# Patient Record
Sex: FEMALE | Race: WHITE | NOT HISPANIC OR LATINO | Employment: OTHER | URBAN - NONMETROPOLITAN AREA
[De-identification: names, ages, dates, MRNs, and addresses within clinical notes are randomized per-mention and may not be internally consistent; named-entity substitution may affect disease eponyms.]

---

## 2017-01-19 ENCOUNTER — TELEPHONE (OUTPATIENT)
Dept: CARDIOLOGY | Facility: CLINIC | Age: 82
End: 2017-01-19

## 2017-01-25 ENCOUNTER — TRANSCRIBE ORDERS (OUTPATIENT)
Dept: ADMINISTRATIVE | Facility: HOSPITAL | Age: 82
End: 2017-01-25

## 2017-01-25 DIAGNOSIS — M54.9 BACK PAIN, UNSPECIFIED BACK LOCATION, UNSPECIFIED BACK PAIN LATERALITY, UNSPECIFIED CHRONICITY: Primary | ICD-10-CM

## 2017-01-26 ENCOUNTER — CLINICAL SUPPORT (OUTPATIENT)
Dept: CARDIOLOGY | Facility: CLINIC | Age: 82
End: 2017-01-26

## 2017-01-26 DIAGNOSIS — Z95.818 STATUS POST PLACEMENT OF IMPLANTABLE LOOP RECORDER: Primary | ICD-10-CM

## 2017-01-26 DIAGNOSIS — I63.9 CRYPTOGENIC STROKE (HCC): ICD-10-CM

## 2017-01-26 PROCEDURE — 93298 REM INTERROG DEV EVAL SCRMS: CPT | Performed by: INTERNAL MEDICINE

## 2017-01-26 PROCEDURE — 93299 PR REM INTERROG ICPMS/SCRMS <30 D TECH REVIEW: CPT | Performed by: INTERNAL MEDICINE

## 2017-01-31 ENCOUNTER — HOSPITAL ENCOUNTER (OUTPATIENT)
Dept: MRI IMAGING | Facility: HOSPITAL | Age: 82
Discharge: HOME | End: 2017-01-31
Admitting: FAMILY MEDICINE

## 2017-01-31 DIAGNOSIS — M54.9 BACK PAIN, UNSPECIFIED BACK LOCATION, UNSPECIFIED BACK PAIN LATERALITY, UNSPECIFIED CHRONICITY: ICD-10-CM

## 2017-01-31 PROCEDURE — 72146 MRI CHEST SPINE W/O DYE: CPT

## 2017-02-10 ENCOUNTER — OFFICE VISIT (OUTPATIENT)
Dept: NEUROLOGY | Facility: CLINIC | Age: 82
End: 2017-02-10

## 2017-02-10 VITALS
HEIGHT: 62 IN | SYSTOLIC BLOOD PRESSURE: 160 MMHG | HEART RATE: 72 BPM | BODY MASS INDEX: 34.04 KG/M2 | WEIGHT: 185 LBS | DIASTOLIC BLOOD PRESSURE: 80 MMHG

## 2017-02-10 DIAGNOSIS — F01.50 VASCULAR DEMENTIA WITHOUT BEHAVIORAL DISTURBANCE (HCC): ICD-10-CM

## 2017-02-10 DIAGNOSIS — I63.311 CEREBROVASCULAR ACCIDENT (CVA) DUE TO THROMBOSIS OF RIGHT MIDDLE CEREBRAL ARTERY (HCC): Primary | ICD-10-CM

## 2017-02-10 PROCEDURE — 99213 OFFICE O/P EST LOW 20 MIN: CPT | Performed by: PHYSICIAN ASSISTANT

## 2017-02-10 RX ORDER — LISINOPRIL AND HYDROCHLOROTHIAZIDE 20; 12.5 MG/1; MG/1
1 TABLET ORAL DAILY
COMMUNITY

## 2017-02-11 NOTE — PROGRESS NOTES
Subjective   Rupa Hernandez is a 81 y.o. female is here today for follow-up.    HPI Comments: Some lability with her blood pressures she is following with her family doctor for.  Slow progressive dementia changes.  Tolerating medication well.    Dementia   This is a chronic problem. The current episode started more than 1 year ago. The problem has been gradually worsening. Associated symptoms include arthralgias, fatigue, myalgias and weakness. Pertinent negatives include no abdominal pain, chest pain, chills, coughing, fever, headaches, nausea, neck pain, rash or vomiting. The symptoms are aggravated by stress. She has tried rest (Family support and pharmacologic measures, control of underlying illnesses including vascular disease) for the symptoms. The treatment provided no relief.   Stroke   This is a chronic problem. The current episode started more than 1 year ago. The problem occurs daily. The problem has been unchanged. Associated symptoms include arthralgias, fatigue, myalgias and weakness. Pertinent negatives include no abdominal pain, chest pain, chills, coughing, fever, headaches, nausea, neck pain, rash or vomiting. Nothing aggravates the symptoms. Treatments tried: Altered activity, adaptive measures, physical therapy rehabilitation, medication. The treatment provided mild relief.       The following portions of the patient's history were reviewed and updated as appropriate: allergies, current medications, past family history, past medical history, past social history, past surgical history and problem list.    Review of Systems   Constitutional: Positive for fatigue. Negative for chills and fever.   HENT: Positive for hearing loss. Negative for ear pain and nosebleeds.    Eyes: Negative.    Respiratory: Negative.  Negative for cough.    Cardiovascular: Negative.  Negative for chest pain.   Gastrointestinal: Negative.  Negative for abdominal pain, nausea and vomiting.   Endocrine: Negative.     Genitourinary: Negative.    Musculoskeletal: Positive for arthralgias and myalgias. Negative for gait problem and neck pain.   Skin: Negative.  Negative for rash.   Allergic/Immunologic: Negative.    Neurological: Positive for speech difficulty, weakness and light-headedness. Negative for syncope and headaches.   Hematological: Negative.    Psychiatric/Behavioral: Positive for agitation, confusion, decreased concentration, dysphoric mood and sleep disturbance. Negative for behavioral problems and hallucinations. The patient is nervous/anxious.        Objective   Physical Exam   Constitutional: Vital signs are normal. She appears well-developed and well-nourished. No distress.   HENT:   Head: Normocephalic and atraumatic.   Right Ear: External ear normal.   Left Ear: External ear normal.   Mouth/Throat: Uvula is midline, oropharynx is clear and moist and mucous membranes are normal.   Eyes: Conjunctivae, EOM and lids are normal. Pupils are equal, round, and reactive to light. No scleral icterus.   Neck: Phonation normal. Normal carotid pulses present. No spinous process tenderness and no muscular tenderness present. Carotid bruit is not present. Decreased range of motion present. No thyroid mass and no thyromegaly present.   Cardiovascular: Normal rate, regular rhythm, S1 normal, S2 normal and normal heart sounds.    No murmur heard.  Pulmonary/Chest: Effort normal and breath sounds normal. No stridor. No respiratory distress. She has no wheezes. She has no rales.   Musculoskeletal: She exhibits no edema or tenderness.        Lumbar back: Normal.   Lymphadenopathy:     She has no cervical adenopathy.   Neurological: She is alert. She has normal strength. She displays no atrophy and no tremor. No cranial nerve deficit or sensory deficit. She exhibits normal muscle tone. She displays a negative Romberg sign. Coordination and gait normal. GCS eye subscore is 4. GCS verbal subscore is 5. GCS motor subscore is 6.    Reflex Scores:       Tricep reflexes are 1+ on the right side and 1+ on the left side.       Bicep reflexes are 1+ on the right side and 1+ on the left side.       Brachioradialis reflexes are 1+ on the right side and 1+ on the left side.       Patellar reflexes are 1+ on the right side and 1+ on the left side.       Achilles reflexes are 1+ on the right side and 1+ on the left side.  Skin: Skin is warm, dry and intact. No ecchymosis, no lesion and no rash noted. No cyanosis. Nails show no clubbing.   Psychiatric: Her affect is labile. Her speech is delayed and tangential. She is slowed. She is not actively hallucinating. Cognition and memory are impaired. She expresses impulsivity. She exhibits abnormal recent memory. She is inattentive.   Nursing note and vitals reviewed.        Assessment/Plan   Rupa was seen today for dementia and stroke.    Diagnoses and all orders for this visit:    Cerebrovascular accident (CVA) due to thrombosis of right middle cerebral artery    Vascular dementia without behavioral disturbance    She is on Xarelto continue Pravachol no changes were made in these medications today.  Continue watching her blood pressure carefully.    Tolerating donepezil and memantine no changes.    10 minutes of 15 minute outpatient visit spent in counseling and coordination of care.          EMR Dragon transcription disclaimer:  Much of this encounter note is an electronic transcription/translation of spoken language to printed text.  The electronic translation of spoken language may permit erroneous, or at times, nonsensical words or phrases to be inadvertently transcribed.  The author has reviewed the note for such errors, however some may still exist.

## 2017-02-12 NOTE — PROGRESS NOTES
I have reviewed the notes, assessments, and/or procedures performed by Eleni Ren RN, I concur with her documentation of Rupa Hernandez.

## 2017-02-13 ENCOUNTER — OFFICE VISIT (OUTPATIENT)
Dept: SURGERY | Age: 82
End: 2017-02-13
Payer: MEDICARE

## 2017-02-13 ENCOUNTER — HOSPITAL ENCOUNTER (OUTPATIENT)
Dept: WOMENS IMAGING | Age: 82
Discharge: HOME | End: 2017-02-13
Payer: MEDICARE

## 2017-02-13 VITALS
HEART RATE: 80 BPM | SYSTOLIC BLOOD PRESSURE: 134 MMHG | DIASTOLIC BLOOD PRESSURE: 80 MMHG | HEIGHT: 62 IN | BODY MASS INDEX: 33.13 KG/M2 | WEIGHT: 180 LBS

## 2017-02-13 DIAGNOSIS — Z12.31 VISIT FOR SCREENING MAMMOGRAM: ICD-10-CM

## 2017-02-13 DIAGNOSIS — Z12.31 VISIT FOR SCREENING MAMMOGRAM: Primary | ICD-10-CM

## 2017-02-13 PROCEDURE — G8484 FLU IMMUNIZE NO ADMIN: HCPCS | Performed by: PHYSICIAN ASSISTANT

## 2017-02-13 PROCEDURE — G8400 PT W/DXA NO RESULTS DOC: HCPCS | Performed by: PHYSICIAN ASSISTANT

## 2017-02-13 PROCEDURE — 1036F TOBACCO NON-USER: CPT | Performed by: PHYSICIAN ASSISTANT

## 2017-02-13 PROCEDURE — 99212 OFFICE O/P EST SF 10 MIN: CPT | Performed by: PHYSICIAN ASSISTANT

## 2017-02-13 PROCEDURE — 4040F PNEUMOC VAC/ADMIN/RCVD: CPT | Performed by: PHYSICIAN ASSISTANT

## 2017-02-13 PROCEDURE — 1090F PRES/ABSN URINE INCON ASSESS: CPT | Performed by: PHYSICIAN ASSISTANT

## 2017-02-13 PROCEDURE — G8419 CALC BMI OUT NRM PARAM NOF/U: HCPCS | Performed by: PHYSICIAN ASSISTANT

## 2017-02-13 PROCEDURE — G8427 DOCREV CUR MEDS BY ELIG CLIN: HCPCS | Performed by: PHYSICIAN ASSISTANT

## 2017-02-13 PROCEDURE — G0202 SCR MAMMO BI INCL CAD: HCPCS

## 2017-02-13 PROCEDURE — 1123F ACP DISCUSS/DSCN MKR DOCD: CPT | Performed by: PHYSICIAN ASSISTANT

## 2017-04-07 ENCOUNTER — HOSPITAL ENCOUNTER (OUTPATIENT)
Dept: PAIN MANAGEMENT | Age: 82
Discharge: HOME | End: 2017-04-07
Payer: MEDICARE

## 2017-04-07 VITALS
HEIGHT: 62 IN | WEIGHT: 191 LBS | OXYGEN SATURATION: 95 % | DIASTOLIC BLOOD PRESSURE: 65 MMHG | HEART RATE: 67 BPM | RESPIRATION RATE: 16 BRPM | BODY MASS INDEX: 35.15 KG/M2 | TEMPERATURE: 97.8 F | SYSTOLIC BLOOD PRESSURE: 172 MMHG

## 2017-04-07 PROCEDURE — G0463 HOSPITAL OUTPT CLINIC VISIT: HCPCS

## 2017-04-07 RX ORDER — MELOXICAM 7.5 MG/1
7.5 TABLET ORAL DAILY
COMMUNITY
End: 2017-06-22

## 2017-04-07 RX ORDER — MEMANTINE HYDROCHLORIDE 28 MG/1
28 CAPSULE, EXTENDED RELEASE ORAL DAILY
COMMUNITY

## 2017-04-07 RX ORDER — CETIRIZINE HYDROCHLORIDE 10 MG/1
10 TABLET ORAL DAILY
COMMUNITY

## 2017-04-07 RX ORDER — DOCUSATE SODIUM 250 MG
250 CAPSULE ORAL DAILY
COMMUNITY

## 2017-04-07 RX ORDER — IPRATROPIUM BROMIDE 21 UG/1
2 SPRAY, METERED NASAL EVERY 12 HOURS
COMMUNITY

## 2017-04-07 RX ORDER — CHLORAL HYDRATE 500 MG
3000 CAPSULE ORAL DAILY
COMMUNITY

## 2017-04-07 RX ORDER — M-VIT,TX,IRON,MINS/CALC/FOLIC 27MG-0.4MG
1 TABLET ORAL DAILY
COMMUNITY

## 2017-04-07 RX ORDER — LISINOPRIL AND HYDROCHLOROTHIAZIDE 20; 12.5 MG/1; MG/1
1 TABLET ORAL DAILY
COMMUNITY

## 2017-04-07 ASSESSMENT — PAIN SCALES - GENERAL: PAINLEVEL_OUTOF10: 7

## 2017-04-07 ASSESSMENT — PAIN DESCRIPTION - PROGRESSION: CLINICAL_PROGRESSION: GRADUALLY WORSENING

## 2017-04-07 ASSESSMENT — PAIN DESCRIPTION - DIRECTION: RADIATING_TOWARDS: DOES NOT RADIATE

## 2017-04-07 ASSESSMENT — PAIN DESCRIPTION - ONSET: ONSET: ON-GOING

## 2017-04-07 ASSESSMENT — PAIN DESCRIPTION - ORIENTATION: ORIENTATION: MID

## 2017-04-07 ASSESSMENT — PAIN DESCRIPTION - FREQUENCY: FREQUENCY: CONTINUOUS

## 2017-04-07 ASSESSMENT — PAIN DESCRIPTION - PAIN TYPE: TYPE: CHRONIC PAIN

## 2017-04-07 ASSESSMENT — PAIN DESCRIPTION - LOCATION: LOCATION: BACK

## 2017-04-24 ENCOUNTER — OFFICE VISIT (OUTPATIENT)
Dept: CARDIOLOGY | Facility: CLINIC | Age: 82
End: 2017-04-24

## 2017-04-24 VITALS
HEIGHT: 62 IN | SYSTOLIC BLOOD PRESSURE: 160 MMHG | WEIGHT: 186.6 LBS | RESPIRATION RATE: 18 BRPM | DIASTOLIC BLOOD PRESSURE: 72 MMHG | HEART RATE: 59 BPM | BODY MASS INDEX: 34.34 KG/M2

## 2017-04-24 DIAGNOSIS — I10 ESSENTIAL HYPERTENSION: Primary | ICD-10-CM

## 2017-04-24 DIAGNOSIS — F01.50 VASCULAR DEMENTIA WITHOUT BEHAVIORAL DISTURBANCE (HCC): ICD-10-CM

## 2017-04-24 DIAGNOSIS — I63.311 CEREBROVASCULAR ACCIDENT (CVA) DUE TO THROMBOSIS OF RIGHT MIDDLE CEREBRAL ARTERY (HCC): ICD-10-CM

## 2017-04-24 DIAGNOSIS — I48.0 PAF (PAROXYSMAL ATRIAL FIBRILLATION) (HCC): ICD-10-CM

## 2017-04-24 DIAGNOSIS — E78.2 MIXED HYPERLIPIDEMIA: ICD-10-CM

## 2017-04-24 PROCEDURE — 99214 OFFICE O/P EST MOD 30 MIN: CPT | Performed by: INTERNAL MEDICINE

## 2017-04-24 PROCEDURE — 93000 ELECTROCARDIOGRAM COMPLETE: CPT | Performed by: INTERNAL MEDICINE

## 2017-04-24 RX ORDER — POTASSIUM CHLORIDE 750 MG/1
10 TABLET, FILM COATED, EXTENDED RELEASE ORAL 2 TIMES DAILY
COMMUNITY
End: 2017-04-24

## 2017-04-24 RX ORDER — ALBUTEROL SULFATE 2.5 MG/3ML
2.5 SOLUTION RESPIRATORY (INHALATION) 2 TIMES DAILY
COMMUNITY
End: 2018-10-01

## 2017-04-24 RX ORDER — HYDROXYCHLOROQUINE SULFATE 200 MG/1
TABLET, FILM COATED ORAL DAILY
COMMUNITY
End: 2017-04-24

## 2017-04-24 RX ORDER — ROPINIROLE 1 MG/1
1 TABLET, FILM COATED ORAL NIGHTLY
COMMUNITY
End: 2017-04-24

## 2017-04-24 RX ORDER — ASPIRIN 81 MG/1
81 TABLET ORAL DAILY
COMMUNITY
End: 2017-04-24

## 2017-04-24 RX ORDER — OMEPRAZOLE 20 MG/1
20 CAPSULE, DELAYED RELEASE ORAL DAILY
COMMUNITY
End: 2017-04-24

## 2017-04-24 RX ORDER — ERGOCALCIFEROL 1.25 MG/1
50000 CAPSULE ORAL WEEKLY
COMMUNITY
End: 2018-10-15 | Stop reason: ALTCHOICE

## 2017-04-24 RX ORDER — GABAPENTIN 300 MG/1
300 CAPSULE ORAL 3 TIMES DAILY
COMMUNITY
End: 2017-04-24

## 2017-04-24 RX ORDER — HYDROCHLOROTHIAZIDE 12.5 MG/1
12.5 TABLET ORAL DAILY
COMMUNITY
End: 2017-04-24

## 2017-04-24 RX ORDER — TRAMADOL HYDROCHLORIDE 50 MG/1
50 TABLET ORAL EVERY 6 HOURS PRN
COMMUNITY
End: 2017-04-24

## 2017-04-24 RX ORDER — METOPROLOL SUCCINATE 100 MG/1
100 TABLET, EXTENDED RELEASE ORAL DAILY
COMMUNITY
End: 2017-04-24

## 2017-04-24 NOTE — PROGRESS NOTES
Rupa Hernandez  [id number]  1935  81 y.o.  female    Referring Provider: Rocco Hauser MD    Reason for Follow-up Visit: PAF    Overall doing well  Denies any chest pain  No excessive shortness of breath  Compliant with medications    History of present illness:  Rupa Hernandez is a 81 y.o. yo female with history of PAF and prior CVA  who presents today for   Chief Complaint   Patient presents with   • Slow Heart Rate     6 month    .    History  Past Medical History:   Diagnosis Date   • Dementia    • Depression    • Hypertension    • Stroke    ,   Past Surgical History:   Procedure Laterality Date   • APPENDECTOMY     • CATARACT EXTRACTION     • HYSTERECTOMY     • KNEE SURGERY Bilateral     X2   • ROTATOR CUFF REPAIR Right    ,   Family History   Problem Relation Age of Onset   • No Known Problems Mother    • No Known Problems Father    ,   Social History   Substance Use Topics   • Smoking status: Never Smoker   • Smokeless tobacco: Never Used   • Alcohol use No   ,     Medications  Current Outpatient Prescriptions   Medication Sig Dispense Refill   • Calcium Carbonate-Vitamin D (CALCIUM PLUS VITAMIN D PO) Take  by mouth Daily.     • cetirizine (ZyrTEC) 10 MG tablet Take 10 mg by mouth Daily.     • CRANBERRY SOFT PO Take 25,000 mg by mouth Daily.     • donepezil (ARICEPT) 10 MG tablet Take 10 mg by mouth Every Night.     • lisinopril-hydrochlorothiazide (PRINZIDE,ZESTORETIC) 20-12.5 MG per tablet Take 1 tablet by mouth Daily.     • memantine (NAMENDA XR) 28 MG capsule sustained-release 24 hr extended release capsule Take 28 mg by mouth Daily.     • Multiple Vitamin (MULTI-VITAMIN PO) Take  by mouth Daily.     • Omega-3 Fatty Acids (FISH OIL) 1000 MG capsule capsule Take  by mouth Daily With Breakfast.     • PARoxetine (PAXIL) 10 MG tablet Take 20 mg by mouth Daily.     • pravastatin (PRAVACHOL) 40 MG tablet Take 40 mg by mouth Daily.     • rivaroxaban (XARELTO) 20 MG tablet Take 1 tablet by mouth Daily. 90 tablet  "3   • albuterol (PROVENTIL) (2.5 MG/3ML) 0.083% nebulizer solution Take 2.5 mg by nebulization 2 (Two) Times a Day.     • diclofenac (VOLTAREN) 1 % gel gel      • vitamin D (ERGOCALCIFEROL) 44185 UNITS capsule capsule Take 50,000 Units by mouth 1 (One) Time Per Week.       No current facility-administered medications for this visit.        Allergies:  Adhesive tape; Atorvastatin; Codeine; Gabapentin; Lortab [hydrocodone-acetaminophen]; Morphine and related; Morphine sulfate; Percocet [oxycodone-acetaminophen]; and Pregabalin    Review of Systems  Review of Systems   Constitution: Negative.   HENT: Negative.    Eyes: Negative.    Cardiovascular: Negative for chest pain, claudication, cyanosis, dyspnea on exertion, irregular heartbeat, leg swelling, near-syncope, orthopnea, palpitations, paroxysmal nocturnal dyspnea and syncope.   Respiratory: Negative.    Endocrine: Negative.    Hematologic/Lymphatic: Negative.    Skin: Negative.    Gastrointestinal: Negative for anorexia.   Genitourinary: Negative.    Neurological: Negative.    Psychiatric/Behavioral: Negative.        Objective     Physical Exam:  /72 (BP Location: Left arm, Patient Position: Sitting, Cuff Size: Adult)  Pulse 59  Resp 18  Ht 62\" (157.5 cm)  Wt 186 lb 9.6 oz (84.6 kg)  BMI 34.13 kg/m2  Physical Exam   Constitutional: She appears well-developed.   HENT:   Head: Normocephalic.   Neck: Normal carotid pulses and no JVD present. No tracheal tenderness present. Carotid bruit is not present. No tracheal deviation and no edema present.   Cardiovascular: Regular rhythm and normal pulses.    Murmur heard.   Systolic murmur is present with a grade of 2/6   Pulmonary/Chest: Effort normal. No stridor.   Abdominal: Soft.   Neurological: She is alert. She has normal strength. No cranial nerve deficit or sensory deficit.   Skin: Skin is warm.   Psychiatric: She has a normal mood and affect. Her speech is normal and behavior is normal.       Results " Review:       ECG 12 Lead  Date/Time: 4/24/2017 10:46 AM  Performed by: JASON HASSAN  Authorized by: JASON HASSAN   Comparison: compared with previous ECG from 10/12/2016  Similar to previous ECG  Rhythm: sinus bradycardia  QRS axis: right  Clinical impression: abnormal ECG  Comments: Low voltage            Assessment/Plan   Patient Active Problem List   Diagnosis   • Essential hypertension   • Mixed hyperlipidemia   • Cerebrovascular accident (CVA)   • PAF (paroxysmal atrial fibrillation)   • Vascular dementia without behavioral disturbance   • Depression   • Hypertension   • Dementia   • Stroke       No palpitations. No significant pedal edema. Compliant with medications and diet. Latest labs and medications reviewed.    Plan:  No additional cardiac testing required at this point in time.  Can hold Xarelto 48 hours for any surgical procedures  Close follow up with you as scheduled.  Intensive factor modifications.  See order list.    Counseled regarding disease appropriate diet, fluid, caffeine, stimulants and sodium intake as well as importance of compliance to diet, exercise and regular follow up.  Avoid NSAIDS and COX2 inhibitors. Use Acetaminophen PRN.    Return in about 9 months (around 1/24/2018).

## 2017-04-26 ENCOUNTER — CLINICAL SUPPORT (OUTPATIENT)
Dept: CARDIOLOGY | Facility: CLINIC | Age: 82
End: 2017-04-26

## 2017-04-26 DIAGNOSIS — I63.9 CRYPTOGENIC STROKE (HCC): ICD-10-CM

## 2017-04-26 DIAGNOSIS — Z95.818 STATUS POST PLACEMENT OF IMPLANTABLE LOOP RECORDER: Primary | ICD-10-CM

## 2017-04-26 PROCEDURE — 93299 PR REM INTERROG ICPMS/SCRMS <30 D TECH REVIEW: CPT | Performed by: INTERNAL MEDICINE

## 2017-04-26 PROCEDURE — 93298 REM INTERROG DEV EVAL SCRMS: CPT | Performed by: INTERNAL MEDICINE

## 2017-04-26 NOTE — PROGRESS NOTES
[facility] Report- [facility]    April 26, 2017    Primary Cardiologist:  Laura  Reason for implant:  cryptogenic stroke  Battery:  OK  Events:  4 a-fib episodes- longest 6 hours 22 min, max v rate 167 bpm, average v rate WNL   2 episodes of bradycardia- longest 39 bpm, longest 13 seconds, both during hours pt likely asleep   1 episode recorded as tachycardia but appears to be interference- pt had an MRI at that time.   meds include xarelto  Changes:  n/a    Follow up:  3 months

## 2017-05-01 ENCOUNTER — TELEPHONE (OUTPATIENT)
Dept: PAIN MANAGEMENT | Age: 82
End: 2017-05-01

## 2017-05-02 ENCOUNTER — TELEPHONE (OUTPATIENT)
Dept: PAIN MANAGEMENT | Age: 82
End: 2017-05-02

## 2017-05-04 ENCOUNTER — HOSPITAL ENCOUNTER (OUTPATIENT)
Dept: PAIN MANAGEMENT | Age: 82
Discharge: HOME | End: 2017-05-04

## 2017-05-04 ENCOUNTER — TELEPHONE (OUTPATIENT)
Dept: CARDIOLOGY | Facility: CLINIC | Age: 82
End: 2017-05-04

## 2017-05-08 ENCOUNTER — HOSPITAL ENCOUNTER (OUTPATIENT)
Dept: PAIN MANAGEMENT | Age: 82
Discharge: HOME | End: 2017-05-08
Payer: MEDICARE

## 2017-05-08 VITALS
TEMPERATURE: 98 F | SYSTOLIC BLOOD PRESSURE: 145 MMHG | HEART RATE: 60 BPM | OXYGEN SATURATION: 98 % | RESPIRATION RATE: 16 BRPM | DIASTOLIC BLOOD PRESSURE: 68 MMHG

## 2017-05-08 DIAGNOSIS — M51.9 THORACIC DISC DISEASE: Chronic | ICD-10-CM

## 2017-05-08 DIAGNOSIS — M54.59 LUMBAR FACET JOINT PAIN: ICD-10-CM

## 2017-05-08 PROCEDURE — 62321 NJX INTERLAMINAR CRV/THRC: CPT

## 2017-05-08 PROCEDURE — 3209999900 FLUORO FOR SURGICAL PROCEDURES

## 2017-05-08 PROCEDURE — 6360000002 HC RX W HCPCS

## 2017-05-08 PROCEDURE — 2500000003 HC RX 250 WO HCPCS

## 2017-05-08 PROCEDURE — 2580000003 HC RX 258

## 2017-05-08 RX ORDER — BUPIVACAINE HYDROCHLORIDE 2.5 MG/ML
INJECTION, SOLUTION EPIDURAL; INFILTRATION; INTRACAUDAL
Status: COMPLETED | OUTPATIENT
Start: 2017-05-08 | End: 2017-05-08

## 2017-05-08 RX ORDER — 0.9 % SODIUM CHLORIDE 0.9 %
VIAL (ML) INJECTION
Status: COMPLETED | OUTPATIENT
Start: 2017-05-08 | End: 2017-05-08

## 2017-05-08 RX ORDER — METHYLPREDNISOLONE ACETATE 80 MG/ML
INJECTION, SUSPENSION INTRA-ARTICULAR; INTRALESIONAL; INTRAMUSCULAR; SOFT TISSUE
Status: COMPLETED | OUTPATIENT
Start: 2017-05-08 | End: 2017-05-08

## 2017-05-08 RX ORDER — LIDOCAINE HYDROCHLORIDE 10 MG/ML
INJECTION, SOLUTION EPIDURAL; INFILTRATION; INTRACAUDAL; PERINEURAL
Status: COMPLETED | OUTPATIENT
Start: 2017-05-08 | End: 2017-05-08

## 2017-05-08 RX ADMIN — Medication 3 ML: at 16:43

## 2017-05-08 RX ADMIN — LIDOCAINE HYDROCHLORIDE 3 ML: 10 INJECTION, SOLUTION EPIDURAL; INFILTRATION; INTRACAUDAL; PERINEURAL at 16:37

## 2017-05-08 RX ADMIN — METHYLPREDNISOLONE ACETATE 60 MG: 80 INJECTION, SUSPENSION INTRA-ARTICULAR; INTRALESIONAL; INTRAMUSCULAR; SOFT TISSUE at 16:44

## 2017-05-08 RX ADMIN — BUPIVACAINE HYDROCHLORIDE 4 ML: 2.5 INJECTION, SOLUTION EPIDURAL; INFILTRATION; INTRACAUDAL at 16:41

## 2017-05-08 ASSESSMENT — PAIN - FUNCTIONAL ASSESSMENT
PAIN_FUNCTIONAL_ASSESSMENT: 0-10
PAIN_FUNCTIONAL_ASSESSMENT: 0-10

## 2017-06-22 ENCOUNTER — HOSPITAL ENCOUNTER (OUTPATIENT)
Dept: PAIN MANAGEMENT | Age: 82
Discharge: HOME | End: 2017-06-22
Payer: MEDICARE

## 2017-06-22 VITALS
OXYGEN SATURATION: 94 % | DIASTOLIC BLOOD PRESSURE: 54 MMHG | RESPIRATION RATE: 20 BRPM | HEART RATE: 65 BPM | WEIGHT: 191 LBS | TEMPERATURE: 96.8 F | BODY MASS INDEX: 35.15 KG/M2 | SYSTOLIC BLOOD PRESSURE: 142 MMHG | HEIGHT: 62 IN

## 2017-06-22 DIAGNOSIS — M51.9 THORACIC DISC DISEASE: ICD-10-CM

## 2017-06-22 PROCEDURE — 99213 OFFICE O/P EST LOW 20 MIN: CPT

## 2017-06-22 ASSESSMENT — PAIN DESCRIPTION - PROGRESSION: CLINICAL_PROGRESSION: NOT CHANGED

## 2017-06-22 ASSESSMENT — PAIN DESCRIPTION - LOCATION: LOCATION: BACK

## 2017-06-22 ASSESSMENT — PAIN DESCRIPTION - PAIN TYPE: TYPE: CHRONIC PAIN

## 2017-06-22 ASSESSMENT — PAIN DESCRIPTION - FREQUENCY: FREQUENCY: CONTINUOUS

## 2017-06-22 ASSESSMENT — PAIN SCALES - GENERAL: PAINLEVEL_OUTOF10: 4

## 2017-06-22 ASSESSMENT — PAIN DESCRIPTION - ORIENTATION: ORIENTATION: MID

## 2017-06-22 ASSESSMENT — PAIN DESCRIPTION - ONSET: ONSET: ON-GOING

## 2017-07-13 ENCOUNTER — TRANSCRIBE ORDERS (OUTPATIENT)
Dept: ADMINISTRATIVE | Facility: HOSPITAL | Age: 82
End: 2017-07-13

## 2017-07-13 DIAGNOSIS — R55 SYNCOPE AND COLLAPSE: Primary | ICD-10-CM

## 2017-07-19 ENCOUNTER — APPOINTMENT (OUTPATIENT)
Dept: MRI IMAGING | Facility: HOSPITAL | Age: 82
End: 2017-07-19

## 2017-07-19 ENCOUNTER — TELEPHONE (OUTPATIENT)
Dept: PAIN MANAGEMENT | Age: 82
End: 2017-07-19

## 2017-07-19 ENCOUNTER — HOSPITAL ENCOUNTER (OUTPATIENT)
Dept: MRI IMAGING | Facility: HOSPITAL | Age: 82
Discharge: HOME | End: 2017-07-19
Admitting: FAMILY MEDICINE

## 2017-07-19 DIAGNOSIS — R55 SYNCOPE AND COLLAPSE: ICD-10-CM

## 2017-07-19 PROCEDURE — 70551 MRI BRAIN STEM W/O DYE: CPT

## 2017-08-11 ENCOUNTER — OFFICE VISIT (OUTPATIENT)
Dept: NEUROLOGY | Facility: CLINIC | Age: 82
End: 2017-08-11

## 2017-08-11 ENCOUNTER — CLINICAL SUPPORT (OUTPATIENT)
Dept: CARDIOLOGY | Facility: CLINIC | Age: 82
End: 2017-08-11

## 2017-08-11 VITALS
SYSTOLIC BLOOD PRESSURE: 140 MMHG | HEART RATE: 64 BPM | WEIGHT: 186 LBS | BODY MASS INDEX: 34.23 KG/M2 | HEIGHT: 62 IN | DIASTOLIC BLOOD PRESSURE: 80 MMHG

## 2017-08-11 DIAGNOSIS — I67.9 CEREBROVASCULAR SMALL VESSEL DISEASE: ICD-10-CM

## 2017-08-11 DIAGNOSIS — I63.311 CEREBROVASCULAR ACCIDENT (CVA) DUE TO THROMBOSIS OF RIGHT MIDDLE CEREBRAL ARTERY (HCC): ICD-10-CM

## 2017-08-11 DIAGNOSIS — Z95.818 STATUS POST PLACEMENT OF IMPLANTABLE LOOP RECORDER: Primary | ICD-10-CM

## 2017-08-11 DIAGNOSIS — F01.50 VASCULAR DEMENTIA WITHOUT BEHAVIORAL DISTURBANCE (HCC): Primary | ICD-10-CM

## 2017-08-11 DIAGNOSIS — I48.0 PAF (PAROXYSMAL ATRIAL FIBRILLATION) (HCC): ICD-10-CM

## 2017-08-11 DIAGNOSIS — I63.9 CRYPTOGENIC STROKE (HCC): ICD-10-CM

## 2017-08-11 PROCEDURE — 93299 PR REM INTERROG ICPMS/SCRMS <30 D TECH REVIEW: CPT | Performed by: INTERNAL MEDICINE

## 2017-08-11 PROCEDURE — 99214 OFFICE O/P EST MOD 30 MIN: CPT | Performed by: PHYSICIAN ASSISTANT

## 2017-08-11 PROCEDURE — 93298 REM INTERROG DEV EVAL SCRMS: CPT | Performed by: INTERNAL MEDICINE

## 2017-08-11 RX ORDER — AMLODIPINE BESYLATE 5 MG/1
5 TABLET ORAL DAILY
COMMUNITY

## 2017-08-11 RX ORDER — ASPIRIN 81 MG/1
81 TABLET ORAL DAILY
COMMUNITY
End: 2017-08-11

## 2017-08-11 NOTE — PROGRESS NOTES
Subjective   Rupa Hernandez is a 82 y.o. female is here today for follow-up.    HPI Comments: Progressive neurocognitive decline.  The patient has had, in the last 6 months, 2 episodes of syncope or near syncope attributed to postural hypotension.  The  relates that he is concerned that donepezil be contributing to these.  The patient is had a recent MRI that is available for review today.    Stroke   This is a chronic problem. The current episode started more than 1 year ago. The problem occurs daily. The problem has been unchanged. Associated symptoms include arthralgias, fatigue, myalgias and weakness. Pertinent negatives include no chills, congestion, fever, headaches or neck pain. Nothing aggravates the symptoms. Treatments tried: Altered activity, adaptive measures, physical therapy rehabilitation, medication. The treatment provided mild relief.   Dementia   This is a chronic problem. The current episode started more than 1 year ago. The problem has been gradually worsening. Associated symptoms include arthralgias, fatigue, myalgias and weakness. Pertinent negatives include no chills, congestion, fever, headaches or neck pain. The symptoms are aggravated by stress. She has tried rest (Family support and pharmacologic measures, control of underlying illnesses including vascular disease) for the symptoms. The treatment provided no relief.       The following portions of the patient's history were reviewed and updated as appropriate: allergies, current medications, past family history, past medical history, past social history, past surgical history and problem list.    Review of Systems   Constitutional: Positive for fatigue. Negative for chills and fever.   HENT: Positive for hearing loss. Negative for congestion and nosebleeds.    Eyes: Negative.    Respiratory: Negative.    Cardiovascular: Negative.    Gastrointestinal: Negative.    Endocrine: Negative.    Genitourinary: Negative.    Musculoskeletal:  Positive for arthralgias and myalgias. Negative for gait problem and neck pain.   Skin: Negative.    Allergic/Immunologic: Negative.    Neurological: Positive for syncope, speech difficulty, weakness and light-headedness. Negative for facial asymmetry and headaches.   Hematological: Negative.    Psychiatric/Behavioral: Positive for agitation, confusion, decreased concentration, dysphoric mood and sleep disturbance. Negative for behavioral problems and hallucinations. The patient is nervous/anxious.        Objective   Physical Exam   Constitutional: Vital signs are normal. She appears well-developed and well-nourished. No distress.   HENT:   Head: Normocephalic and atraumatic.   Right Ear: External ear normal.   Left Ear: External ear normal.   Mouth/Throat: Uvula is midline, oropharynx is clear and moist and mucous membranes are normal.   Eyes: Conjunctivae, EOM and lids are normal. Pupils are equal, round, and reactive to light. No scleral icterus.   Neck: Phonation normal. Normal carotid pulses present. No spinous process tenderness and no muscular tenderness present. Carotid bruit is not present. Decreased range of motion present. No thyroid mass and no thyromegaly present.   Cardiovascular: Normal rate, regular rhythm, S1 normal, S2 normal and normal heart sounds.    No murmur heard.  Pulmonary/Chest: Effort normal and breath sounds normal. No stridor. No respiratory distress. She has no wheezes. She has no rales.   Musculoskeletal: She exhibits no edema or tenderness.        Lumbar back: Normal.   Lymphadenopathy:     She has no cervical adenopathy.   Neurological: She is alert. She has normal strength. She displays no atrophy and no tremor. No cranial nerve deficit or sensory deficit. She exhibits normal muscle tone. She displays a negative Romberg sign. Coordination and gait normal. GCS eye subscore is 4. GCS verbal subscore is 5. GCS motor subscore is 6.   Reflex Scores:       Tricep reflexes are 1+ on the  right side and 1+ on the left side.       Bicep reflexes are 1+ on the right side and 1+ on the left side.       Brachioradialis reflexes are 1+ on the right side and 1+ on the left side.       Patellar reflexes are 1+ on the right side and 1+ on the left side.       Achilles reflexes are 1+ on the right side and 1+ on the left side.  Skin: Skin is warm, dry and intact. No ecchymosis, no lesion and no rash noted. No cyanosis. Nails show no clubbing.   Psychiatric: Her affect is labile. Her speech is delayed and tangential. She is slowed. She is not actively hallucinating. Cognition and memory are impaired. She expresses impulsivity. She exhibits abnormal recent memory. She is inattentive.   Nursing note and vitals reviewed.        Assessment/Plan   Rupa was seen today for stroke and dementia.    Diagnoses and all orders for this visit:    Vascular dementia without behavioral disturbance    Cerebrovascular accident (CVA) due to thrombosis of right middle cerebral artery    The patient's  raises concern over adverse reaction to donepezil.  The patient has had multiple adverse medication reactions in the past, it is not likely donepezil is contributing to relief of her neurocognitive degeneration and therefore is recommended that this medicine be discontinued.    The patient's 07/19/2017 MRI of the brain is compared to 12/02/2015 and MRI of the brain. In  2015, the patient had some small areas of acute infarct in the right frontal lobe, these have evolved into more chronic changes.  She has rather extensive small vessel cerebrovascular disease and substantial atrophy.  There are no acute changes and her MRI is considered stable.    The patient remains on Xarelto for cardiac disease/ PAF.  Conversation with the patient and family regarding the combination of aspirin and Xarelto.  It is not felt that the addition of aspirin to Xarelto will contribute to improvement in her quality of life or delay of her  neurocognitive degeneration and therefore the risk of increased bleeding may be greater then the improvement that can be expected from the addition of aspirin.    20 minutes of a 25 minute outpatient visit was spent in counseling and coordination of care today.        EMR Dragon transcription disclaimer:  Much of this encounter note is an electronic transcription/translation of spoken language to printed text.  The electronic translation of spoken language may permit erroneous, or at times, nonsensical words or phrases to be inadvertently transcribed.  The author has reviewed the note for such errors, however some may still exist.

## 2017-08-12 NOTE — PROGRESS NOTES
[facility] Report- [facility]    August 11, 2017    Primary Cardiologist:  Laura  Reason for implant:  cryptogenic stroke  Battery:  OK  Events:  1 episode recorded as a pause- undersensing of PVCs, not true pause   6 episodes of bradycardia- longest 30 seconds, lowest rate 31 bpm, some early morning, others likely while pt awake, no symptoms recorded  Changes:  n/a    Follow up:  3 months

## 2017-08-20 NOTE — PROGRESS NOTES
I have reviewed the notes, assessments, and/or procedures performed by KANG Murillo, I concur with her/his documentation of Rupa Hernandez.  Te Medrano MD

## 2017-08-26 NOTE — PROGRESS NOTES
I have reviewed the notes, assessments, and/or procedures performed by Eleni Ren RN , I concur with her  documentation of  Rupa Hernandez.

## 2017-09-08 ENCOUNTER — CLINICAL SUPPORT (OUTPATIENT)
Dept: CARDIOLOGY | Facility: CLINIC | Age: 82
End: 2017-09-08

## 2017-09-08 DIAGNOSIS — Z95.818 STATUS POST PLACEMENT OF IMPLANTABLE LOOP RECORDER: Primary | ICD-10-CM

## 2017-09-08 DIAGNOSIS — I63.9 CRYPTOGENIC STROKE (HCC): ICD-10-CM

## 2017-09-08 NOTE — PROGRESS NOTES
[facility] Monitoring Service Report    September 8, 2017    Primary Cardiologist:  Laura  Reason for implant:  cryptogenic stroke  Battery:  OK  Events:  There were 5 Jesus episodes of which 0 were symptomatic.  Changes:  N/A    Follow up:  1 month

## 2017-09-09 NOTE — PROGRESS NOTES
I have reviewed the notes, assessments, and/or procedures performed by Priya Mckeon , I concur with her  documentation of    Rupa Hernandez.

## 2017-11-27 ENCOUNTER — CLINICAL SUPPORT (OUTPATIENT)
Dept: CARDIOLOGY | Facility: CLINIC | Age: 82
End: 2017-11-27

## 2017-11-27 DIAGNOSIS — I63.9 CRYPTOGENIC STROKE (HCC): ICD-10-CM

## 2017-11-27 DIAGNOSIS — Z95.818 STATUS POST PLACEMENT OF IMPLANTABLE LOOP RECORDER: Primary | ICD-10-CM

## 2017-11-27 PROCEDURE — 93298 REM INTERROG DEV EVAL SCRMS: CPT | Performed by: INTERNAL MEDICINE

## 2017-11-27 NOTE — PROGRESS NOTES
[facility] Monitoring Service Report    November 27, 2017    Primary Cardiologist:  Laura  Reason for implant:  cryptogenic stroke  Battery:    Events:  There were 1 Jesus episodes of which 0 were symptomatic.  Changes:  n/a    Follow up:  1 month

## 2017-12-27 ENCOUNTER — CLINICAL SUPPORT (OUTPATIENT)
Dept: CARDIOLOGY | Facility: CLINIC | Age: 82
End: 2017-12-27

## 2017-12-27 DIAGNOSIS — Z95.818 STATUS POST PLACEMENT OF IMPLANTABLE LOOP RECORDER: Primary | ICD-10-CM

## 2017-12-27 DIAGNOSIS — I63.9 CRYPTOGENIC STROKE (HCC): ICD-10-CM

## 2017-12-27 PROCEDURE — 93298 REM INTERROG DEV EVAL SCRMS: CPT | Performed by: INTERNAL MEDICINE

## 2017-12-27 NOTE — PROGRESS NOTES
[facility] Service Report    December 27, 2017    Primary Cardiologist:  Laura  Reason for implant:  cryptogenic stroke  Battery:    Events:  There were no automatically detected episodes and there were no  symptomatic episodes documented by the patient.  Changes:  n/a    Follow up:  1 month

## 2018-01-08 ENCOUNTER — CLINICAL SUPPORT (OUTPATIENT)
Dept: CARDIOLOGY | Facility: CLINIC | Age: 83
End: 2018-01-08

## 2018-01-08 DIAGNOSIS — I63.89 CEREBROVASCULAR ACCIDENT (CVA) DUE TO OTHER MECHANISM (HCC): ICD-10-CM

## 2018-01-08 PROCEDURE — 93298 REM INTERROG DEV EVAL SCRMS: CPT | Performed by: PHYSICIAN ASSISTANT

## 2018-01-08 PROCEDURE — 93299 PR REM INTERROG ICPMS/SCRMS <30 D TECH REVIEW: CPT | Performed by: PHYSICIAN ASSISTANT

## 2018-01-08 NOTE — PROGRESS NOTES
[facility] Report- [facility]    January 8, 2018    Primary Cardiologist:  Laura  Reason for implant:  cryptogenic stroke  Battery:  RRT  Events:  none  Changes:  n/a    Follow up:  Will d/w Dr. Sanchez re: replacement.

## 2018-01-10 ENCOUNTER — TELEPHONE (OUTPATIENT)
Dept: SURGERY | Age: 83
End: 2018-01-10

## 2018-01-10 NOTE — PROGRESS NOTES
I have reviewed the notes, assessments, and/or procedures performed by  Samantha Melton PA-C  , I concur with her  documentation of   Rupa Hernandez.

## 2018-01-23 ENCOUNTER — TELEPHONE (OUTPATIENT)
Dept: CARDIOLOGY | Facility: CLINIC | Age: 83
End: 2018-01-23

## 2018-01-23 ENCOUNTER — OFFICE VISIT (OUTPATIENT)
Dept: CARDIOLOGY | Facility: CLINIC | Age: 83
End: 2018-01-23

## 2018-01-23 VITALS
HEART RATE: 63 BPM | BODY MASS INDEX: 34.23 KG/M2 | SYSTOLIC BLOOD PRESSURE: 130 MMHG | HEIGHT: 62 IN | OXYGEN SATURATION: 98 % | DIASTOLIC BLOOD PRESSURE: 76 MMHG | WEIGHT: 186 LBS

## 2018-01-23 DIAGNOSIS — I10 ESSENTIAL HYPERTENSION: Primary | ICD-10-CM

## 2018-01-23 DIAGNOSIS — F01.50 VASCULAR DEMENTIA WITHOUT BEHAVIORAL DISTURBANCE (HCC): ICD-10-CM

## 2018-01-23 DIAGNOSIS — I48.0 PAF (PAROXYSMAL ATRIAL FIBRILLATION) (HCC): ICD-10-CM

## 2018-01-23 DIAGNOSIS — E78.2 MIXED HYPERLIPIDEMIA: ICD-10-CM

## 2018-01-23 DIAGNOSIS — F32.89 OTHER DEPRESSION: ICD-10-CM

## 2018-01-23 DIAGNOSIS — I67.9 CEREBROVASCULAR SMALL VESSEL DISEASE: ICD-10-CM

## 2018-01-23 PROCEDURE — 93000 ELECTROCARDIOGRAM COMPLETE: CPT | Performed by: INTERNAL MEDICINE

## 2018-01-23 PROCEDURE — 99214 OFFICE O/P EST MOD 30 MIN: CPT | Performed by: INTERNAL MEDICINE

## 2018-01-23 NOTE — PROGRESS NOTES
Rupa Hernandez  [id number]  1935  82 y.o.  female    Referring Provider: Rocco Hauser MD    Reason for Follow-up Visit:  Routine follow up.  Paroxysmal atrial fibrillation     Subjective    Overall feeling well   No chest pain or shortness of breath   No palpitations  No significant pedal edema  Compliant with medications and dietary advice  Good effort tolerance  No presyncope or syncope  Compliant with medications        History of present illness:  Rupa Hernandez is a 82 y.o. yo female with history of Paroxysmal atrial fibrillation  and prior cerebrovascular accident  who presents today for   Chief Complaint   Patient presents with   • Hypertension     9 mo f/u   • Dizziness   .    History  Past Medical History:   Diagnosis Date   • Dementia    • Depression    • Hypertension    • Stroke    ,   Past Surgical History:   Procedure Laterality Date   • APPENDECTOMY     • CATARACT EXTRACTION     • HYSTERECTOMY     • KNEE SURGERY Bilateral     X2   • ROTATOR CUFF REPAIR Right    ,   Family History   Problem Relation Age of Onset   • No Known Problems Mother    • No Known Problems Father    ,   Social History   Substance Use Topics   • Smoking status: Never Smoker   • Smokeless tobacco: Never Used   • Alcohol use No   ,     Medications  Current Outpatient Prescriptions   Medication Sig Dispense Refill   • albuterol (PROVENTIL) (2.5 MG/3ML) 0.083% nebulizer solution Take 2.5 mg by nebulization 2 (Two) Times a Day.     • amLODIPine (NORVASC) 5 MG tablet Take 5 mg by mouth Daily.     • Calcium Carbonate-Vitamin D (CALCIUM PLUS VITAMIN D PO) Take  by mouth Daily.     • cetirizine (ZyrTEC) 10 MG tablet Take 10 mg by mouth Daily.     • CRANBERRY SOFT PO Take 25,000 mg by mouth Daily.     • diclofenac (VOLTAREN) 1 % gel gel      • lisinopril-hydrochlorothiazide (PRINZIDE,ZESTORETIC) 20-12.5 MG per tablet Take 1 tablet by mouth Daily.     • memantine (NAMENDA XR) 28 MG capsule sustained-release 24 hr extended release capsule  "Take 28 mg by mouth Daily.     • Multiple Vitamin (MULTI-VITAMIN PO) Take  by mouth Daily.     • Omega-3 Fatty Acids (FISH OIL) 1000 MG capsule capsule Take  by mouth Daily With Breakfast.     • PARoxetine (PAXIL) 10 MG tablet Take 20 mg by mouth Daily.     • pravastatin (PRAVACHOL) 40 MG tablet Take 40 mg by mouth Daily.     • rivaroxaban (XARELTO) 20 MG tablet Take 1 tablet by mouth Daily. 90 tablet 3   • vitamin D (ERGOCALCIFEROL) 15963 UNITS capsule capsule Take 50,000 Units by mouth 1 (One) Time Per Week.       No current facility-administered medications for this visit.        Allergies:  Adhesive tape; Atorvastatin; Codeine; Gabapentin; Lortab [hydrocodone-acetaminophen]; Morphine and related; Morphine sulfate; Oxycodone-acetaminophen; Percocet [oxycodone-acetaminophen]; and Pregabalin    Review of Systems  Review of Systems   Constitution: Negative.   HENT: Negative.    Eyes: Negative.    Cardiovascular: Negative for chest pain, claudication, cyanosis, dyspnea on exertion, irregular heartbeat, leg swelling, near-syncope, orthopnea, palpitations, paroxysmal nocturnal dyspnea and syncope.   Respiratory: Negative.    Endocrine: Negative.    Hematologic/Lymphatic: Negative.    Skin: Negative.    Gastrointestinal: Negative for anorexia.   Genitourinary: Negative.    Neurological: Negative.    Psychiatric/Behavioral: Negative.        Objective     Physical Exam:  /76  Pulse 63  Ht 157.5 cm (62.01\")  Wt 84.4 kg (186 lb)  SpO2 98%  BMI 34.01 kg/m2  Physical Exam   Constitutional: She appears well-developed.   HENT:   Head: Normocephalic.   Neck: Normal carotid pulses and no JVD present. No tracheal tenderness present. Carotid bruit is not present. No tracheal deviation and no edema present.   Cardiovascular: Regular rhythm and normal pulses.    Murmur heard.   Systolic murmur is present with a grade of 2/6   Pulmonary/Chest: Effort normal. No stridor.   Abdominal: Soft.   Neurological: She is alert. She " "has normal strength. No cranial nerve deficit or sensory deficit.   Skin: Skin is warm.   Psychiatric: She has a normal mood and affect. Her speech is normal and behavior is normal.       Results Review:       ECG 12 Lead  Date/Time: 1/23/2018 11:20 AM  Performed by: JASON HASSAN  Authorized by: JASON HASSAN   Comparison: compared with previous ECG from 4/24/2017  Similar to previous ECG  Comparison to previous ECG: Poor R wave progression   Rhythm: sinus bradycardia  Rate: bradycardic  Conduction: conduction normal  ST Segments: ST segments normal  T Waves: T waves normal  QRS axis: normal  Clinical impression: abnormal ECG            Assessment/Plan   Patient Active Problem List   Diagnosis   • Essential hypertension   • Mixed hyperlipidemia   • Cerebrovascular accident (CVA)   • PAF (paroxysmal atrial fibrillation)   • Vascular dementia without behavioral disturbance   • Depression   • Dementia   • Cerebrovascular small vessel disease     BP well controlled at home. No palpitations. No significant pedal edema. Compliant with medications and diet. Latest labs and medications reviewed.    Plan:    Low salt/ HTN/ Heart healthy carbohydrate restricted cardiac diet as applicable to this patient's current diagnoses.   This handout has relevant information regarding shopping for food, preparing meals, what to eat at restaurants, tracking of food intake, information regarding sodium intake and salt content, how to read food labels, knowing what to eat, tips reagarding physical activity, calorie count and calorie expenditure. What foods to avoid. Information regarding alcoholic drinks along with \"good\" and \"bad\" fats.  Relevant printed educational materials given pertinent to above diagnoses     Monitor for any signs of bleeding including red or dark stools. Fall precautions.   Patient is asked to monitor BP at home or work, several times per month and return with written values at next office visit.   Close follow up " with you as scheduled.  Intensive factor modifications.  See order list.    Keep LDL below 70 mg/dl. Monitor liver and renal functions.   Monitor CBC, CMP and Lipid Panel by primary   Monitor cardiac rhythm device (loop recorder) function regularly per established schedule or PRN Counseled regarding disease appropriate diet, fluid, caffeine, stimulants and sodium intake as well as importance of compliance to diet, exercise and regular follow up.  Avoid NSAIDS and COX2 inhibitors. Use Acetaminophen PRN.  The patient was advised that NSAID-type medications have three  very important potential side effects: cardiovascular complications, gastrointestinal irritation including hemorrhage and renal injuries.  Do not use anti-inflammatories such as Motrin/ibuprofen, Alleve/naprosyn, Mobic and like medications Use Tylenol instead.The patient expresses understanding of these issues and questions were answered.   monitor for any signs of bleeding including red or dark stools. Fall precautions.    No additional cardiac testing required at this point in time.  Gave a copy of my notes and relevant tests/ prior ECG etc for the patient to review and follow specific advise and relevant findings if any, prognosis, along with my current and future plans.       Return in about 6 months (around 7/23/2018).

## 2018-03-06 ENCOUNTER — OFFICE VISIT (OUTPATIENT)
Dept: SURGERY | Age: 83
End: 2018-03-06
Payer: MEDICARE

## 2018-03-06 ENCOUNTER — HOSPITAL ENCOUNTER (OUTPATIENT)
Dept: WOMENS IMAGING | Age: 83
Discharge: HOME | End: 2018-03-06
Payer: MEDICARE

## 2018-03-06 VITALS
HEART RATE: 80 BPM | SYSTOLIC BLOOD PRESSURE: 130 MMHG | BODY MASS INDEX: 34.41 KG/M2 | WEIGHT: 187 LBS | HEIGHT: 62 IN | DIASTOLIC BLOOD PRESSURE: 70 MMHG

## 2018-03-06 DIAGNOSIS — Z12.31 VISIT FOR SCREENING MAMMOGRAM: ICD-10-CM

## 2018-03-06 DIAGNOSIS — Z12.39 SCREENING BREAST EXAMINATION: Primary | ICD-10-CM

## 2018-03-06 PROCEDURE — G8427 DOCREV CUR MEDS BY ELIG CLIN: HCPCS | Performed by: PHYSICIAN ASSISTANT

## 2018-03-06 PROCEDURE — 4040F PNEUMOC VAC/ADMIN/RCVD: CPT | Performed by: PHYSICIAN ASSISTANT

## 2018-03-06 PROCEDURE — 99212 OFFICE O/P EST SF 10 MIN: CPT | Performed by: PHYSICIAN ASSISTANT

## 2018-03-06 PROCEDURE — 1123F ACP DISCUSS/DSCN MKR DOCD: CPT | Performed by: PHYSICIAN ASSISTANT

## 2018-03-06 PROCEDURE — G8400 PT W/DXA NO RESULTS DOC: HCPCS | Performed by: PHYSICIAN ASSISTANT

## 2018-03-06 PROCEDURE — 1090F PRES/ABSN URINE INCON ASSESS: CPT | Performed by: PHYSICIAN ASSISTANT

## 2018-03-06 PROCEDURE — 77063 BREAST TOMOSYNTHESIS BI: CPT

## 2018-03-06 PROCEDURE — G8417 CALC BMI ABV UP PARAM F/U: HCPCS | Performed by: PHYSICIAN ASSISTANT

## 2018-03-06 PROCEDURE — 1036F TOBACCO NON-USER: CPT | Performed by: PHYSICIAN ASSISTANT

## 2018-03-06 PROCEDURE — G8484 FLU IMMUNIZE NO ADMIN: HCPCS | Performed by: PHYSICIAN ASSISTANT

## 2018-03-07 NOTE — PROGRESS NOTES
HPI:  Isaac Rosenberg is in for yearly follow-up breast check. She has not noticed any changes in her breasts. SCREENING BILATERAL DIGITAL MAMMOGRAM WITH TOMOSYNTHESIS 3/6/2018   11:49 AM   CLINICAL HISTORY: Screening.     COMPARISON STUDIES: 2/13/2017, 2/12/2016 and 2/11/2015. FINDINGS:    Digital CC and MLO views of bilateral breasts were obtained. Tomosynthesis in the MLO and CC projections was also performed. There are scattered fibroglandular densities (approximately 25-50%   glandular tissue) consistent with a type B parenchymal pattern. No   suspicious masses or calcifications are identified. There has been no   interval change. This study was interpreted with CAD. IMPRESSION AND RECOMMENDATION:    No mammographic evidence of malignancy. Recommendation is for the   patient to return for routine mammography in one year or sooner, if   clinically indicated.    BIRADS Category 2 - Benign findings         BREAST EXAM:  On examination, she has fibrocystic changes throughout both breasts, no dominant masses, no skin or nipple changes and no axillary adenopathy. I see nothing suspicious for breast cancer. ASSESSMENT:  Normal breast exam and mammogram    PLAN:  I will plan to see her back in 1 year for physical exam and yearly mammograms. She will contact me if anything significant changes.

## 2018-03-30 ENCOUNTER — OFFICE VISIT (OUTPATIENT)
Dept: NEUROLOGY | Facility: CLINIC | Age: 83
End: 2018-03-30

## 2018-03-30 VITALS
HEIGHT: 62 IN | BODY MASS INDEX: 34.6 KG/M2 | HEART RATE: 66 BPM | SYSTOLIC BLOOD PRESSURE: 132 MMHG | WEIGHT: 188 LBS | DIASTOLIC BLOOD PRESSURE: 80 MMHG

## 2018-03-30 DIAGNOSIS — I63.311 CEREBROVASCULAR ACCIDENT (CVA) DUE TO THROMBOSIS OF RIGHT MIDDLE CEREBRAL ARTERY (HCC): Primary | ICD-10-CM

## 2018-03-30 DIAGNOSIS — I67.9 CEREBROVASCULAR SMALL VESSEL DISEASE: ICD-10-CM

## 2018-03-30 DIAGNOSIS — F01.50 VASCULAR DEMENTIA WITHOUT BEHAVIORAL DISTURBANCE (HCC): ICD-10-CM

## 2018-03-30 PROCEDURE — 99213 OFFICE O/P EST LOW 20 MIN: CPT | Performed by: PHYSICIAN ASSISTANT

## 2018-03-30 PROCEDURE — 3288F FALL RISK ASSESSMENT DOCD: CPT | Performed by: PHYSICIAN ASSISTANT

## 2018-03-30 RX ORDER — METHYLPREDNISOLONE 4 MG/1
TABLET ORAL
Refills: 0 | COMMUNITY
Start: 2018-03-22 | End: 2018-04-02

## 2018-03-30 RX ORDER — MEMANTINE HYDROCHLORIDE 10 MG/1
10 TABLET ORAL 2 TIMES DAILY
Qty: 60 TABLET | Refills: 6 | Status: SHIPPED | OUTPATIENT
Start: 2018-03-30 | End: 2018-10-01

## 2018-03-30 NOTE — PROGRESS NOTES
Subjective   Rupa Hernandez is a 82 y.o. female is here today for follow-up.    Stroke   This is a chronic problem. The current episode started more than 1 year ago. The problem occurs daily. The problem has been unchanged. Associated symptoms include arthralgias, fatigue, myalgias and weakness. Pertinent negatives include no chills, congestion, fever, headaches or neck pain. Nothing aggravates the symptoms. Treatments tried: Altered activity, adaptive measures, physical therapy rehabilitation, medication. The treatment provided mild relief.   Dementia   This is a chronic problem. The current episode started more than 1 year ago. The problem has been gradually worsening. Associated symptoms include arthralgias, fatigue, myalgias and weakness. Pertinent negatives include no chills, congestion, fever, headaches or neck pain. The symptoms are aggravated by stress. She has tried rest (Family support and pharmacologic measures, control of underlying illnesses including vascular disease) for the symptoms. The treatment provided no relief.       The following portions of the patient's history were reviewed and updated as appropriate: allergies, current medications, past family history, past medical history, past social history, past surgical history and problem list.    Review of Systems   Constitutional: Positive for fatigue. Negative for chills and fever.   HENT: Positive for hearing loss. Negative for congestion and nosebleeds.    Eyes: Negative.    Respiratory: Negative.    Cardiovascular: Negative.    Gastrointestinal: Negative.    Endocrine: Negative.    Genitourinary: Negative.    Musculoskeletal: Positive for arthralgias and myalgias. Negative for gait problem and neck pain.   Skin: Negative.    Allergic/Immunologic: Negative.    Neurological: Positive for syncope, speech difficulty, weakness and light-headedness. Negative for facial asymmetry and headaches.   Hematological: Negative.    Psychiatric/Behavioral:  Positive for agitation, confusion, decreased concentration, dysphoric mood and sleep disturbance. Negative for behavioral problems and hallucinations. The patient is nervous/anxious.        Objective   Physical Exam   Constitutional: Vital signs are normal. She appears well-developed and well-nourished. No distress.   HENT:   Head: Normocephalic and atraumatic.   Right Ear: External ear normal.   Left Ear: External ear normal.   Mouth/Throat: Uvula is midline, oropharynx is clear and moist and mucous membranes are normal.   Eyes: Conjunctivae, EOM and lids are normal. Pupils are equal, round, and reactive to light. No scleral icterus.   Neck: Phonation normal. Normal carotid pulses present. No spinous process tenderness and no muscular tenderness present. Carotid bruit is not present. Decreased range of motion present. No thyroid mass and no thyromegaly present.   Cardiovascular: Normal rate, regular rhythm, S1 normal, S2 normal and normal heart sounds.    No murmur heard.  Pulmonary/Chest: Effort normal and breath sounds normal. No stridor. No respiratory distress. She has no wheezes. She has no rales.   Musculoskeletal: She exhibits no edema or tenderness.        Lumbar back: Normal.   Lymphadenopathy:     She has no cervical adenopathy.   Neurological: She is alert. She has normal strength. She displays no atrophy and no tremor. No cranial nerve deficit or sensory deficit. She exhibits normal muscle tone. She displays a negative Romberg sign. Coordination and gait normal. GCS eye subscore is 4. GCS verbal subscore is 5. GCS motor subscore is 6.   Reflex Scores:       Tricep reflexes are 1+ on the right side and 1+ on the left side.       Bicep reflexes are 1+ on the right side and 1+ on the left side.       Brachioradialis reflexes are 1+ on the right side and 1+ on the left side.       Patellar reflexes are 1+ on the right side and 1+ on the left side.       Achilles reflexes are 1+ on the right side and 1+ on  the left side.  Skin: Skin is warm, dry and intact. No ecchymosis, no lesion and no rash noted. No cyanosis. Nails show no clubbing.   Psychiatric: Her affect is labile. Her speech is delayed and tangential. She is slowed. She is not actively hallucinating. Cognition and memory are impaired. She expresses impulsivity. She exhibits abnormal recent memory. She is inattentive.   Nursing note and vitals reviewed.        Assessment/Plan   Rupa was seen today for stroke and dementia.    Diagnoses and all orders for this visit:    Cerebrovascular accident (CVA) due to thrombosis of right middle cerebral artery    Cerebrovascular small vessel disease    Vascular dementia without behavioral disturbance    Patient is neurologically stable.  There have been some issues affording the extended release Namenda.  I provided them with a printed prescription for regular release memantine 10 mg twice per day, the patient's  is going to check with her insurance company as to whether this would provide them with a cheaper alternative.  If it is much cheaper, they will switch to memantine 10 mg twice per day, if there is not much difference they will remain on extended release Namenda XR 28 mg per day.    No other changes were made in her medication regimen today.    10 minutes of a 15 minute outpatient visit was spent in counseling and coordination of care today.      Fall Risk Assessment  Fallen in past 6 months: 0--> No  Mental Status: 1--> confused  Mobility: 0--> No mobility issues  Medications: 5--> Diuretics  Total Fall Risk Score: 8    Dictated utilizing Dragon dictation.

## 2018-05-07 ENCOUNTER — CLINICAL SUPPORT (OUTPATIENT)
Dept: CARDIOLOGY | Facility: CLINIC | Age: 83
End: 2018-05-07

## 2018-05-07 DIAGNOSIS — Z95.818 STATUS POST PLACEMENT OF IMPLANTABLE LOOP RECORDER: Primary | ICD-10-CM

## 2018-05-07 DIAGNOSIS — I63.9 CRYPTOGENIC STROKE (HCC): ICD-10-CM

## 2018-05-07 PROCEDURE — 93299 PR REM INTERROG ICPMS/SCRMS <30 D TECH REVIEW: CPT | Performed by: INTERNAL MEDICINE

## 2018-05-07 PROCEDURE — 93298 REM INTERROG DEV EVAL SCRMS: CPT | Performed by: INTERNAL MEDICINE

## 2018-05-09 ENCOUNTER — TELEPHONE (OUTPATIENT)
Dept: CARDIOLOGY | Facility: CLINIC | Age: 83
End: 2018-05-09

## 2018-05-09 NOTE — TELEPHONE ENCOUNTER
Patient's  returned RN's call.  RN notified him that patient's LINQ monitor has reached EOS.  They will bring monitor into office to be returned to [employer] at next clinic visit.  Patient prefers to leave device in at this time.

## 2018-05-09 NOTE — TELEPHONE ENCOUNTER
Left message for patient.  Need to discuss EOS Linq monitor.  Device has been EOS since 04-FEB-2018.  Need to know patient's preference regarding explantation.  Will continue to try to reach.

## 2018-05-09 NOTE — PROGRESS NOTES
Linq Report- REMOTE/CARELINK    MAY 7, 2018    Primary Cardiologist:  Laura  Reason for implant:  cryptogenic stroke  Battery:  EOS since 04-FEB-2018  Events:  1 pause episode on April 5, 2018, at 06:38 a.m., duration 4 seconds, presumably during sleeping hours.  Changes:  N/A    Follow up:  RN will call patient to discuss her preference regarding LINQ explantation.

## 2018-05-14 NOTE — PROGRESS NOTES
I have reviewed the notes, assessments, and/or procedures performed by  Rita Haque RN, I concur with her  documentation of   Rupa Hernandez.

## 2018-10-01 ENCOUNTER — OFFICE VISIT (OUTPATIENT)
Dept: NEUROLOGY | Facility: CLINIC | Age: 83
End: 2018-10-01

## 2018-10-01 VITALS
HEART RATE: 65 BPM | HEIGHT: 62 IN | BODY MASS INDEX: 34.23 KG/M2 | SYSTOLIC BLOOD PRESSURE: 162 MMHG | DIASTOLIC BLOOD PRESSURE: 80 MMHG | WEIGHT: 186 LBS

## 2018-10-01 DIAGNOSIS — I67.9 CEREBROVASCULAR SMALL VESSEL DISEASE: ICD-10-CM

## 2018-10-01 DIAGNOSIS — I63.311 CEREBROVASCULAR ACCIDENT (CVA) DUE TO THROMBOSIS OF RIGHT MIDDLE CEREBRAL ARTERY (HCC): Primary | ICD-10-CM

## 2018-10-01 DIAGNOSIS — F01.50 VASCULAR DEMENTIA WITHOUT BEHAVIORAL DISTURBANCE (HCC): ICD-10-CM

## 2018-10-01 PROCEDURE — 99213 OFFICE O/P EST LOW 20 MIN: CPT | Performed by: PHYSICIAN ASSISTANT

## 2018-10-01 RX ORDER — IPRATROPIUM BROMIDE 21 UG/1
2 SPRAY, METERED NASAL EVERY 12 HOURS PRN
COMMUNITY
End: 2022-10-20

## 2018-10-01 RX ORDER — DOCUSATE SODIUM 250 MG
250 CAPSULE ORAL DAILY
COMMUNITY

## 2018-10-04 NOTE — PROGRESS NOTES
Subjective   Rupa Hernandez is a 83 y.o. female is here today for follow-up.    Stroke   This is a chronic problem. The current episode started more than 1 year ago. The problem occurs daily. The problem has been unchanged. Associated symptoms include arthralgias, congestion, fatigue, myalgias and weakness. Pertinent negatives include no chills, fever, headaches or neck pain. Nothing aggravates the symptoms. Treatments tried: Altered activity, adaptive measures, physical therapy rehabilitation, medication. The treatment provided mild relief.   Dementia   This is a chronic problem. The current episode started more than 1 year ago. The problem has been gradually worsening. Associated symptoms include arthralgias, congestion, fatigue, myalgias and weakness. Pertinent negatives include no chills, fever, headaches or neck pain. The symptoms are aggravated by stress. She has tried rest (Family support and pharmacologic measures, control of underlying illnesses including vascular disease) for the symptoms. The treatment provided no relief.       The following portions of the patient's history were reviewed and updated as appropriate: allergies, current medications, past family history, past medical history, past social history, past surgical history and problem list.    Review of Systems   Constitutional: Positive for fatigue. Negative for chills and fever.   HENT: Positive for congestion and hearing loss. Negative for nosebleeds.    Eyes: Negative.    Respiratory: Negative.    Cardiovascular: Negative.    Gastrointestinal: Negative.    Endocrine: Negative.    Genitourinary: Negative.    Musculoskeletal: Positive for arthralgias and myalgias. Negative for gait problem and neck pain.   Skin: Negative.    Allergic/Immunologic: Negative.    Neurological: Positive for syncope, speech difficulty, weakness and light-headedness. Negative for facial asymmetry and headaches.   Hematological: Negative.    Psychiatric/Behavioral:  Positive for agitation, confusion, decreased concentration, dysphoric mood and sleep disturbance. Negative for behavioral problems and hallucinations. The patient is nervous/anxious.        Objective   Physical Exam   Constitutional: Vital signs are normal. She appears well-developed and well-nourished. No distress.   HENT:   Head: Normocephalic and atraumatic.   Right Ear: External ear normal.   Left Ear: External ear normal.   Mouth/Throat: Uvula is midline, oropharynx is clear and moist and mucous membranes are normal.   Eyes: Pupils are equal, round, and reactive to light. Conjunctivae, EOM and lids are normal. No scleral icterus.   Neck: Phonation normal. Normal carotid pulses present. No spinous process tenderness and no muscular tenderness present. Carotid bruit is not present. Decreased range of motion present. No thyroid mass and no thyromegaly present.   Cardiovascular: Normal rate, regular rhythm, S1 normal, S2 normal and normal heart sounds.    No murmur heard.  Pulmonary/Chest: Effort normal and breath sounds normal. No stridor. No respiratory distress. She has no wheezes. She has no rales.   Musculoskeletal: She exhibits no edema or tenderness.        Lumbar back: Normal.   Lymphadenopathy:     She has no cervical adenopathy.   Neurological: She is alert. She has normal strength. She displays no atrophy and no tremor. No cranial nerve deficit or sensory deficit. She exhibits normal muscle tone. She displays a negative Romberg sign. Coordination and gait normal. GCS eye subscore is 4. GCS verbal subscore is 5. GCS motor subscore is 6.   Reflex Scores:       Tricep reflexes are 1+ on the right side and 1+ on the left side.       Bicep reflexes are 1+ on the right side and 1+ on the left side.       Brachioradialis reflexes are 1+ on the right side and 1+ on the left side.       Patellar reflexes are 1+ on the right side and 1+ on the left side.       Achilles reflexes are 1+ on the right side and 1+ on  the left side.  Skin: Skin is warm, dry and intact. No ecchymosis, no lesion and no rash noted. No cyanosis. Nails show no clubbing.   Psychiatric: Her affect is labile. Her speech is delayed and tangential. She is slowed. She is not actively hallucinating. Cognition and memory are impaired. She expresses impulsivity. She exhibits abnormal recent memory. She is inattentive.   Nursing note and vitals reviewed.        Assessment/Plan   Rupa was seen today for stroke and dementia.    Diagnoses and all orders for this visit:    Cerebrovascular accident (CVA) due to thrombosis of right middle cerebral artery (CMS/HCC)    Cerebrovascular small vessel disease    Vascular dementia without behavioral disturbance      The patient is neurologically stable, no changes recommended    10 minutes of a 15 minute outpatient visit was spent in counseling and coordination of care with the patient and her  today.

## 2018-10-15 ENCOUNTER — OFFICE VISIT (OUTPATIENT)
Dept: CARDIOLOGY | Facility: CLINIC | Age: 83
End: 2018-10-15

## 2018-10-15 VITALS
HEART RATE: 67 BPM | HEIGHT: 62 IN | BODY MASS INDEX: 34.41 KG/M2 | OXYGEN SATURATION: 98 % | SYSTOLIC BLOOD PRESSURE: 140 MMHG | DIASTOLIC BLOOD PRESSURE: 70 MMHG | WEIGHT: 187 LBS

## 2018-10-15 DIAGNOSIS — E78.2 MIXED HYPERLIPIDEMIA: ICD-10-CM

## 2018-10-15 DIAGNOSIS — F01.50 VASCULAR DEMENTIA WITHOUT BEHAVIORAL DISTURBANCE (HCC): ICD-10-CM

## 2018-10-15 DIAGNOSIS — I48.0 PAF (PAROXYSMAL ATRIAL FIBRILLATION) (HCC): ICD-10-CM

## 2018-10-15 DIAGNOSIS — F32.89 OTHER DEPRESSION: ICD-10-CM

## 2018-10-15 DIAGNOSIS — I11.9 HYPERTENSIVE LEFT VENTRICULAR HYPERTROPHY, WITHOUT HEART FAILURE: ICD-10-CM

## 2018-10-15 DIAGNOSIS — I67.9 CEREBROVASCULAR SMALL VESSEL DISEASE: ICD-10-CM

## 2018-10-15 DIAGNOSIS — I10 ESSENTIAL HYPERTENSION: Primary | ICD-10-CM

## 2018-10-15 DIAGNOSIS — I63.311 CEREBROVASCULAR ACCIDENT (CVA) DUE TO THROMBOSIS OF RIGHT MIDDLE CEREBRAL ARTERY (HCC): ICD-10-CM

## 2018-10-15 DIAGNOSIS — F03.90 DEMENTIA WITHOUT BEHAVIORAL DISTURBANCE, UNSPECIFIED DEMENTIA TYPE: ICD-10-CM

## 2018-10-15 DIAGNOSIS — I35.0 NONRHEUMATIC AORTIC VALVE STENOSIS: ICD-10-CM

## 2018-10-15 PROCEDURE — 99214 OFFICE O/P EST MOD 30 MIN: CPT | Performed by: INTERNAL MEDICINE

## 2018-10-15 NOTE — PROGRESS NOTES
Rupa Hernandez  [id number]  1935  83 y.o.  female    Referring Provider: Rocco Hauser MD    Reason for Follow-up Visit:  Routine follow up.  Paroxysmal atrial fibrillation   cerebrovascular accident   Essential Hypertension         Subjective    Overall feeling well   No chest pain or shortness of breath   No palpitations    No significant pedal edema  Compliant with medications and dietary advice      Poor effort tolerance  Effort tolerance limited more by orthopedic rather than cardiac related issues, therefore difficult to assess functional capacity.       No presyncope or syncope  Compliant with medications    Joint pain in small, medium and large joints  Severe chronic low back pain      Feels tired   Easy fatiguability         History of present illness:  Rupa Hernandez is a 83 y.o. yo female with history of Paroxysmal atrial fibrillation  and prior cerebrovascular accident  who presents today for   Chief Complaint   Patient presents with   • Atrial Fibrillation     6 mo f/u   .    History  Past Medical History:   Diagnosis Date   • Dementia    • Depression    • Hypertension    • Stroke (CMS/HCC)    ,   Past Surgical History:   Procedure Laterality Date   • APPENDECTOMY     • CATARACT EXTRACTION     • HYSTERECTOMY     • KNEE SURGERY Bilateral     X2   • ROTATOR CUFF REPAIR Right    ,   Family History   Problem Relation Age of Onset   • No Known Problems Mother    • No Known Problems Father    ,   Social History   Substance Use Topics   • Smoking status: Never Smoker   • Smokeless tobacco: Never Used   • Alcohol use No   ,     Medications  Current Outpatient Prescriptions   Medication Sig Dispense Refill   • amLODIPine (NORVASC) 5 MG tablet Take 5 mg by mouth Daily.     • Calcium Carbonate-Vitamin D (CALCIUM PLUS VITAMIN D PO) Take  by mouth Daily.     • cetirizine (ZyrTEC) 10 MG tablet Take 10 mg by mouth Daily.     • CRANBERRY SOFT PO Take 25,000 mg by mouth Daily.     • diclofenac (VOLTAREN) 1 % gel gel  "Apply 4 g topically to the appropriate area as directed Daily.     • docusate sodium (COLACE) 250 MG capsule Take 250 mg by mouth Daily.     • ipratropium (ATROVENT) 0.03 % nasal spray 2 sprays into the nostril(s) as directed by provider Every 12 (Twelve) Hours.     • lisinopril-hydrochlorothiazide (PRINZIDE,ZESTORETIC) 20-12.5 MG per tablet Take 1 tablet by mouth Daily.     • memantine (NAMENDA XR) 28 MG capsule sustained-release 24 hr extended release capsule Take 28 mg by mouth Daily.     • Multiple Vitamin (MULTI-VITAMIN PO) Take  by mouth Daily.     • Omega-3 Fatty Acids (FISH OIL) 1000 MG capsule capsule Take  by mouth Daily With Breakfast.     • PARoxetine (PAXIL) 20 MG tablet Take 20 mg by mouth Daily.     • pravastatin (PRAVACHOL) 40 MG tablet Take 40 mg by mouth Daily.     • rivaroxaban (XARELTO) 20 MG tablet Take 1 tablet by mouth Daily. 90 tablet 3     No current facility-administered medications for this visit.      Results for orders placed in visit on 12/02/15   SCANNED - ECHOCARDIOGRAM       Allergies:  Adhesive tape; Atorvastatin; Codeine; Gabapentin; Lortab [hydrocodone-acetaminophen]; Morphine and related; Morphine sulfate; Oxycodone-acetaminophen; Percocet [oxycodone-acetaminophen]; and Pregabalin    Review of Systems  Review of Systems   Constitution: Negative.   HENT: Negative.    Eyes: Negative.    Cardiovascular: Negative for chest pain, claudication, cyanosis, dyspnea on exertion, irregular heartbeat, leg swelling, near-syncope, orthopnea, palpitations, paroxysmal nocturnal dyspnea and syncope.   Respiratory: Negative.    Endocrine: Negative.    Hematologic/Lymphatic: Negative.    Skin: Negative.    Gastrointestinal: Negative for anorexia.   Genitourinary: Negative.    Neurological: Negative.    Psychiatric/Behavioral: Negative.        Objective     Physical Exam:  /70   Pulse 67   Ht 157.5 cm (62\")   Wt 84.8 kg (187 lb)   SpO2 98%   BMI 34.20 kg/m²   Physical Exam "   Constitutional: She appears well-developed.   HENT:   Head: Normocephalic.   Neck: Normal carotid pulses and no JVD present. No tracheal tenderness present. Carotid bruit is not present. No tracheal deviation and no edema present.   Cardiovascular: Regular rhythm and normal pulses.    Murmur heard.   Systolic murmur is present with a grade of 2/6   Pulmonary/Chest: Effort normal. No stridor.   Abdominal: Soft.   Neurological: She is alert. She has normal strength. No cranial nerve deficit or sensory deficit.   Skin: Skin is warm.   Psychiatric: She has a normal mood and affect. Her speech is normal and behavior is normal.       Results Review:     Procedures    Assessment/Plan   Patient Active Problem List   Diagnosis   • Essential hypertension   • Mixed hyperlipidemia   • Cerebrovascular accident (CVA) (CMS/HCC)   • PAF (paroxysmal atrial fibrillation) (CMS/HCC)   • Vascular dementia without behavioral disturbance   • Depression   • Dementia   • Cerebrovascular small vessel disease   • Nonrheumatic aortic valve stenosis Mild 2015 Echo   • Hypertensive left ventricular hypertrophy, without heart failure Mild to moderate 2015 Echo     BP well controlled at home. No palpitations. No significant pedal edema. Compliant with medications and diet. Latest labs and medications reviewed.      Plan        Orders Placed This Encounter   Procedures   • Adult Transthoracic Echo Complete W/ Cont if Necessary Per Protocol     Standing Status:   Future     Standing Expiration Date:   10/15/2019     Order Specific Question:   Reason for exam?     Answer:   Dyspnea       xxxxxxxxxxxxxxxxxxxxxxxxxxxxxxxxxxxxxxxxxxxxxxxxxxxxxxxxxxxxxxxxxxxxxxxxxxxxxxxxxxxxxxxxxxxxxxxxxxxxxxxxxxxxxxxxxxxxxxxxxxxxxxxxxxxxxxxxxxxxxxxxxxxxxxxxxxxxxxxxxxxxxxxxxxxxxxxxxxxxxxxxxxxxxxxxxxxxxxxxxxxxxxxxxxxxxxxxxxxxxxxxxxxxxxxxxxxxxxxxxxxxxxxx  Health maintenance    Low salt/ HTN/ Heart healthy carbohydrate restricted cardiac diet as applicable to this  "patient's current diagnoses.   This handout has relevant information regarding shopping for food, preparing meals, what to eat at restaurants, tracking of food intake, information regarding sodium intake and salt content, how to read food labels, knowing what to eat, tips reagarding physical activity, calorie count and calorie expenditure. What foods to avoid. Information regarding alcoholic drinks along with \"good\" and \"bad\" fats.  Reiterated prior recommendations     The patient is advised to reduce or avoid caffeine or other cardiac stimulants.     The patient was advised that NSAID-type medications have three  very important potential side effects: cardiovascular complications, gastrointestinal irritation including hemorrhage and renal injuries.  Do not use anti-inflammatories such as Motrin/ibuprofen, Alleve/naprosyn, Mobic and like medications Use Tylenol instead.The patient expresses understanding of these issues and questions were answered.   Monitor for any signs of bleeding including red or dark stools. Fall precautions.       Monitor for any signs of bleeding including red or dark stools. Fall precautions.   Patient is asked to monitor BP at home or work, several times per month and return with written values at next office visit.     Advised staying uptodate with immunizations per established standard guidelines.      Offered to give patient  a copy of my notes and relevant tests/ prior ECG etc for the patient to review and follow specific advise and relevant findings if any, prognosis, along with my current and future plans.      Questions were encouraged, asked and answered to the patient's  understanding and satisfaction. Questions if any regarding current medications and side effects, need for refills and importance of compliance to medications stressed.    Reviewed available prior notes, consults, prior visits, laboratory findings, radiology and cardiology relevant reports. Updated chart as " applicable. I have reviewed the patient's medical history in detail and updated the computerized patient record as relevant.      Updated patient regarding any new or relevant abnormalities on review of records or any new findings on physical exam. Mentioned to patient about purpose of visit and desirable health short and long term goals and objectives.        xxxxxxxxxxxxxxxxxxxxxxxxxxxxxxxxxxxxxxxxxxxxxxxxxxxxxxxxxxxxxxxxxxxxxxxxxxxxxxxxxxxxxxxxxxxxxxxxxxxxxxxxxxxxxxxxxxxxxxxxxxxxxxxxxxxxxxxxxxxxxxxxxxxxxxxxxxxxxxxxxxxxxxxxxxxxxxxxxxxxxxxxxxxxxxxxxxxxxxxxxxxxxxxxxxxxxxxxxxxxxxxxxxxxxxxxxxxxxxxxxxxxxxxx      Return if symptoms worsen or fail to improve.    Call or return to clinic prn if these symptoms worsen or fail to improve as anticipated.   Per patient request

## 2019-03-18 ENCOUNTER — HOSPITAL ENCOUNTER (OUTPATIENT)
Dept: WOMENS IMAGING | Age: 84
Discharge: HOME | End: 2019-03-18
Payer: MEDICARE

## 2019-03-18 ENCOUNTER — OFFICE VISIT (OUTPATIENT)
Dept: SURGERY | Age: 84
End: 2019-03-18
Payer: MEDICARE

## 2019-03-18 VITALS — WEIGHT: 189 LBS | HEIGHT: 62 IN | BODY MASS INDEX: 34.78 KG/M2 | TEMPERATURE: 98.6 F

## 2019-03-18 DIAGNOSIS — Z12.39 SCREENING BREAST EXAMINATION: Primary | ICD-10-CM

## 2019-03-18 DIAGNOSIS — Z12.39 SCREENING BREAST EXAMINATION: ICD-10-CM

## 2019-03-18 PROCEDURE — 99213 OFFICE O/P EST LOW 20 MIN: CPT | Performed by: PHYSICIAN ASSISTANT

## 2019-03-18 PROCEDURE — 77067 SCR MAMMO BI INCL CAD: CPT

## 2019-03-18 PROCEDURE — 1036F TOBACCO NON-USER: CPT | Performed by: PHYSICIAN ASSISTANT

## 2019-03-18 PROCEDURE — G8427 DOCREV CUR MEDS BY ELIG CLIN: HCPCS | Performed by: PHYSICIAN ASSISTANT

## 2019-03-18 PROCEDURE — G8400 PT W/DXA NO RESULTS DOC: HCPCS | Performed by: PHYSICIAN ASSISTANT

## 2019-03-18 PROCEDURE — G8417 CALC BMI ABV UP PARAM F/U: HCPCS | Performed by: PHYSICIAN ASSISTANT

## 2019-03-18 PROCEDURE — 1123F ACP DISCUSS/DSCN MKR DOCD: CPT | Performed by: PHYSICIAN ASSISTANT

## 2019-03-18 PROCEDURE — 4040F PNEUMOC VAC/ADMIN/RCVD: CPT | Performed by: PHYSICIAN ASSISTANT

## 2019-03-18 PROCEDURE — G8484 FLU IMMUNIZE NO ADMIN: HCPCS | Performed by: PHYSICIAN ASSISTANT

## 2019-03-18 PROCEDURE — 1090F PRES/ABSN URINE INCON ASSESS: CPT | Performed by: PHYSICIAN ASSISTANT

## 2019-06-13 ENCOUNTER — APPOINTMENT (OUTPATIENT)
Dept: GENERAL RADIOLOGY | Facility: HOSPITAL | Age: 84
End: 2019-06-13

## 2019-06-13 ENCOUNTER — HOSPITAL ENCOUNTER (OUTPATIENT)
Facility: HOSPITAL | Age: 84
Setting detail: OBSERVATION
Discharge: HOME | End: 2019-06-15
Attending: NEUROLOGICAL SURGERY | Admitting: NEUROLOGICAL SURGERY

## 2019-06-13 ENCOUNTER — APPOINTMENT (OUTPATIENT)
Dept: MRI IMAGING | Facility: HOSPITAL | Age: 84
End: 2019-06-13

## 2019-06-13 DIAGNOSIS — Z74.09 IMPAIRED MOBILITY: ICD-10-CM

## 2019-06-13 DIAGNOSIS — Z78.9 IMPAIRED MOBILITY AND ADLS: ICD-10-CM

## 2019-06-13 DIAGNOSIS — Z74.09 IMPAIRED MOBILITY AND ADLS: ICD-10-CM

## 2019-06-13 PROBLEM — S32.000A LUMBAR COMPRESSION FRACTURE (HCC): Status: ACTIVE | Noted: 2019-06-13

## 2019-06-13 LAB
ALBUMIN SERPL-MCNC: 3.9 G/DL (ref 3.5–5)
ALBUMIN/GLOB SERPL: 1.3 G/DL (ref 1.1–2.5)
ALP SERPL-CCNC: 69 U/L (ref 24–120)
ALT SERPL W P-5'-P-CCNC: 24 U/L (ref 0–54)
ANION GAP SERPL CALCULATED.3IONS-SCNC: 5 MMOL/L (ref 4–13)
APTT PPP: 32.2 SECONDS (ref 24.1–35)
AST SERPL-CCNC: 29 U/L (ref 7–45)
BASOPHILS # BLD AUTO: 0.03 10*3/MM3 (ref 0–0.2)
BASOPHILS NFR BLD AUTO: 0.3 % (ref 0–2)
BILIRUB SERPL-MCNC: 0.4 MG/DL (ref 0.1–1)
BUN BLD-MCNC: 31 MG/DL (ref 5–21)
BUN/CREAT SERPL: 35.2 (ref 7–25)
CALCIUM SPEC-SCNC: 9 MG/DL (ref 8.4–10.4)
CHLORIDE SERPL-SCNC: 99 MMOL/L (ref 98–110)
CO2 SERPL-SCNC: 32 MMOL/L (ref 24–31)
CREAT BLD-MCNC: 0.88 MG/DL (ref 0.5–1.4)
DEPRECATED RDW RBC AUTO: 43.9 FL (ref 40–54)
EOSINOPHIL # BLD AUTO: 0.05 10*3/MM3 (ref 0–0.7)
EOSINOPHIL NFR BLD AUTO: 0.5 % (ref 0–4)
ERYTHROCYTE [DISTWIDTH] IN BLOOD BY AUTOMATED COUNT: 13.4 % (ref 12–15)
GFR SERPL CREATININE-BSD FRML MDRD: 62 ML/MIN/1.73
GLOBULIN UR ELPH-MCNC: 3.1 GM/DL
GLUCOSE BLD-MCNC: 159 MG/DL (ref 70–100)
HCT VFR BLD AUTO: 39 % (ref 37–47)
HGB BLD-MCNC: 12.8 G/DL (ref 12–16)
IMM GRANULOCYTES # BLD AUTO: 0.05 10*3/MM3 (ref 0–0.05)
IMM GRANULOCYTES NFR BLD AUTO: 0.5 % (ref 0–5)
INR PPP: 1.24 (ref 0.91–1.09)
LYMPHOCYTES # BLD AUTO: 1.16 10*3/MM3 (ref 0.72–4.86)
LYMPHOCYTES NFR BLD AUTO: 11 % (ref 15–45)
MCH RBC QN AUTO: 29.2 PG (ref 28–32)
MCHC RBC AUTO-ENTMCNC: 32.8 G/DL (ref 33–36)
MCV RBC AUTO: 89 FL (ref 82–98)
MONOCYTES # BLD AUTO: 0.65 10*3/MM3 (ref 0.19–1.3)
MONOCYTES NFR BLD AUTO: 6.1 % (ref 4–12)
NEUTROPHILS # BLD AUTO: 8.63 10*3/MM3 (ref 1.87–8.4)
NEUTROPHILS NFR BLD AUTO: 81.6 % (ref 39–78)
NRBC BLD AUTO-RTO: 0 /100 WBC (ref 0–0.2)
PLATELET # BLD AUTO: 195 10*3/MM3 (ref 130–400)
PMV BLD AUTO: 10.7 FL (ref 6–12)
POTASSIUM BLD-SCNC: 4.1 MMOL/L (ref 3.5–5.3)
PROT SERPL-MCNC: 7 G/DL (ref 6.3–8.7)
PROTHROMBIN TIME: 16 SECONDS (ref 11.9–14.6)
RBC # BLD AUTO: 4.38 10*6/MM3 (ref 4.2–5.4)
SODIUM BLD-SCNC: 136 MMOL/L (ref 135–145)
WBC NRBC COR # BLD: 10.57 10*3/MM3 (ref 4.8–10.8)

## 2019-06-13 PROCEDURE — G0378 HOSPITAL OBSERVATION PER HR: HCPCS

## 2019-06-13 PROCEDURE — 25010000002 ONDANSETRON PER 1 MG: Performed by: NURSE PRACTITIONER

## 2019-06-13 PROCEDURE — 85025 COMPLETE CBC W/AUTO DIFF WBC: CPT | Performed by: NURSE PRACTITIONER

## 2019-06-13 PROCEDURE — 99219 PR INITIAL OBSERVATION CARE/DAY 50 MINUTES: CPT | Performed by: NEUROLOGICAL SURGERY

## 2019-06-13 PROCEDURE — 80053 COMPREHEN METABOLIC PANEL: CPT | Performed by: NURSE PRACTITIONER

## 2019-06-13 PROCEDURE — 25010000002 LORAZEPAM PER 2 MG: Performed by: NURSE PRACTITIONER

## 2019-06-13 PROCEDURE — 94799 UNLISTED PULMONARY SVC/PX: CPT

## 2019-06-13 PROCEDURE — 94760 N-INVAS EAR/PLS OXIMETRY 1: CPT

## 2019-06-13 PROCEDURE — 96375 TX/PRO/DX INJ NEW DRUG ADDON: CPT

## 2019-06-13 PROCEDURE — 85730 THROMBOPLASTIN TIME PARTIAL: CPT | Performed by: NURSE PRACTITIONER

## 2019-06-13 PROCEDURE — 73610 X-RAY EXAM OF ANKLE: CPT

## 2019-06-13 PROCEDURE — 85610 PROTHROMBIN TIME: CPT | Performed by: NURSE PRACTITIONER

## 2019-06-13 PROCEDURE — 72148 MRI LUMBAR SPINE W/O DYE: CPT

## 2019-06-13 PROCEDURE — G0379 DIRECT REFER HOSPITAL OBSERV: HCPCS

## 2019-06-13 PROCEDURE — 96374 THER/PROPH/DIAG INJ IV PUSH: CPT

## 2019-06-13 RX ORDER — MEMANTINE HYDROCHLORIDE 5 MG/1
10 TABLET ORAL EVERY 12 HOURS SCHEDULED
Status: DISCONTINUED | OUTPATIENT
Start: 2019-06-13 | End: 2019-06-15 | Stop reason: HOSPADM

## 2019-06-13 RX ORDER — FAMOTIDINE 20 MG/1
40 TABLET, FILM COATED ORAL DAILY
Status: DISCONTINUED | OUTPATIENT
Start: 2019-06-13 | End: 2019-06-15 | Stop reason: HOSPADM

## 2019-06-13 RX ORDER — PAROXETINE HYDROCHLORIDE 20 MG/1
20 TABLET, FILM COATED ORAL DAILY
Status: DISCONTINUED | OUTPATIENT
Start: 2019-06-13 | End: 2019-06-15 | Stop reason: HOSPADM

## 2019-06-13 RX ORDER — LORAZEPAM 2 MG/ML
1 INJECTION INTRAMUSCULAR ONCE
Status: COMPLETED | OUTPATIENT
Start: 2019-06-13 | End: 2019-06-13

## 2019-06-13 RX ORDER — SODIUM CHLORIDE 0.9 % (FLUSH) 0.9 %
3 SYRINGE (ML) INJECTION EVERY 12 HOURS SCHEDULED
Status: DISCONTINUED | OUTPATIENT
Start: 2019-06-13 | End: 2019-06-15 | Stop reason: HOSPADM

## 2019-06-13 RX ORDER — ONDANSETRON 2 MG/ML
4 INJECTION INTRAMUSCULAR; INTRAVENOUS EVERY 6 HOURS PRN
Status: DISCONTINUED | OUTPATIENT
Start: 2019-06-13 | End: 2019-06-15 | Stop reason: HOSPADM

## 2019-06-13 RX ORDER — CETIRIZINE HYDROCHLORIDE 5 MG/1
5 TABLET ORAL DAILY
Status: DISCONTINUED | OUTPATIENT
Start: 2019-06-13 | End: 2019-06-15 | Stop reason: HOSPADM

## 2019-06-13 RX ORDER — PRAVASTATIN SODIUM 20 MG
40 TABLET ORAL NIGHTLY
Status: DISCONTINUED | OUTPATIENT
Start: 2019-06-13 | End: 2019-06-14

## 2019-06-13 RX ORDER — AMLODIPINE BESYLATE 5 MG/1
5 TABLET ORAL DAILY
Status: DISCONTINUED | OUTPATIENT
Start: 2019-06-13 | End: 2019-06-15 | Stop reason: HOSPADM

## 2019-06-13 RX ORDER — CALCIUM CARBONATE 500(1250)
500 TABLET ORAL DAILY
Status: DISCONTINUED | OUTPATIENT
Start: 2019-06-13 | End: 2019-06-15 | Stop reason: HOSPADM

## 2019-06-13 RX ORDER — SODIUM CHLORIDE 0.9 % (FLUSH) 0.9 %
3-10 SYRINGE (ML) INJECTION AS NEEDED
Status: DISCONTINUED | OUTPATIENT
Start: 2019-06-13 | End: 2019-06-15 | Stop reason: HOSPADM

## 2019-06-13 RX ORDER — MEPERIDINE HYDROCHLORIDE 50 MG/1
50 TABLET ORAL EVERY 6 HOURS PRN
Status: DISCONTINUED | OUTPATIENT
Start: 2019-06-13 | End: 2019-06-14

## 2019-06-13 RX ORDER — IPRATROPIUM BROMIDE 21 UG/1
2 SPRAY, METERED NASAL EVERY 12 HOURS SCHEDULED
Status: DISCONTINUED | OUTPATIENT
Start: 2019-06-13 | End: 2019-06-15 | Stop reason: HOSPADM

## 2019-06-13 RX ORDER — ONDANSETRON 4 MG/1
4 TABLET, FILM COATED ORAL EVERY 6 HOURS PRN
Status: DISCONTINUED | OUTPATIENT
Start: 2019-06-13 | End: 2019-06-15 | Stop reason: HOSPADM

## 2019-06-13 RX ADMIN — RIVAROXABAN 20 MG: 20 TABLET, FILM COATED ORAL at 17:59

## 2019-06-13 RX ADMIN — FAMOTIDINE 40 MG: 20 TABLET, FILM COATED ORAL at 12:41

## 2019-06-13 RX ADMIN — Medication 40 MG: at 20:54

## 2019-06-13 RX ADMIN — MEPERIDINE HYDROCHLORIDE 50 MG: 50 TABLET ORAL at 20:53

## 2019-06-13 RX ADMIN — MEPERIDINE HYDROCHLORIDE 50 MG: 50 TABLET ORAL at 13:59

## 2019-06-13 RX ADMIN — LORAZEPAM 1 MG: 2 INJECTION INTRAMUSCULAR; INTRAVENOUS at 15:37

## 2019-06-13 RX ADMIN — ONDANSETRON 4 MG: 2 INJECTION, SOLUTION INTRAMUSCULAR; INTRAVENOUS at 12:38

## 2019-06-13 RX ADMIN — SODIUM CHLORIDE, PRESERVATIVE FREE 3 ML: 5 INJECTION INTRAVENOUS at 12:43

## 2019-06-13 RX ADMIN — SODIUM CHLORIDE, PRESERVATIVE FREE 3 ML: 5 INJECTION INTRAVENOUS at 20:55

## 2019-06-13 NOTE — PROGRESS NOTES
MRI of the lumbar spine completed.  No significant central canal stenosis.  STIR signal changes suggesting acute cord compression fracture.  Patient/family notified.      May be tolerable at present.  Patient to be fitted for LSO brace.    SHIV Lama

## 2019-06-13 NOTE — H&P
NEUROSURGERY INITIAL HOSPITAL ENCOUNTER    Assessment/Plan:   Rupa Hernandez is a 83 y.o. female with a significant comorbidity dementia currently on blood thinners.  She presents with a new problem of falling from standing height. Physical exam findings of thoracolumbar point tenderness but otherwise neurologically intact.  Their imaging shows relatively minor L1 compression fracture.    Differential Diagnosis:   Osteoperosis  L1 compression fracture without neurological compression  Left ankle ecchymosis concerning for sprain versus fracture    Recommendations:  Compression fracture lumbar spine L!.   Risk factors: Prior history of osteoporosis but no steroid use   Fractures appear stable.     MRI of lumbar spine to age fractures.    LSO for comfort and mobilized.     Once fitted will require supine and upright x-rays of the lumbar spine to ensure stability.   Maximal medical management for analgesia.    Narcotics, Tylenol, Gabapentin, valium, Medrol dose pack and lidocaine patches.     Avoid NSAIDs.   PT/OT   We will consider kyphoplasty only after failure of conservative management, 2 weeks   Given patient's dementia and poor mobility she will most likely require skilled nursing facility or rehab placement    Osteoporosis/osteopenia:   Vit D, Ca and DEXA scan   Vitamin D  50,000 international units by mouth per week ×4 weeks followed by,  1000 international units by mouth daily    Calcium  The [place] [employer] (IOM) has issued recommendations for calcium and vitamin D daily intake in older adults.     Women, > 50 years = 1200 mg/day of calcium.   For both sexes, the recommended upper level was 2000 mg/day. [179]     Calcium Carbonate - Cholecalciferol 600mg/800units tabs.  Take 2 tab PO Q daily.  In addition take Cholcalciferol 5,000 units tabs.  Take 50,000 units once a week for the first 4 weeks.       I discussed the patients findings and my recommendations with patient and family    Level of Risk: Moderate  due to: undiagnosed new problem  MDM: Moderate Complexity  Mod = 37661, High=99223  ___________________________________________________________________    Reason for consult compression fracture    Chief Complaint: Back pain    HPI: Rupa Hernandez is a 83 y.o. female with a significant comorbidity dementia, depression, hyper tension, and prior stroke.  She was in her normal state of health until approximately 4 AM this morning when she fell at home.  The patient lives in her home with her .  She has dementia and so got up in the middle the night.  She stumbled and fell causing a bruise on her left ankle and having immediate onset of back pain.  Her  could not lift her off the floor and therefore called EMS.  She was taken to an outside hospital where a noncontrast CT scan of her thoracic lumbar spine revealed an L1 compression fracture.  Based on the fact that the patient was on anticoagulation and her spinal cord injury she was then transferred to [facility] for further evaluation.    The patient is currently admitted to the neurosurgical service.  She has a complaint of thoracolumbar junction pain.  She denies weakness numbness or tingling in her legs.  She has not stood and walked yet secondary to pain.  She has no bowel or bladder incontinence.  Her pain currently is an 8 out of 10.  Given her dementia she cannot provide any details of the inciting incident..      Review of Systems   Constitutional: Negative.    HENT: Negative.    Eyes: Negative.    Respiratory: Negative.    Cardiovascular: Negative.    Gastrointestinal: Negative.    Endocrine: Negative.    Genitourinary: Negative.    Musculoskeletal: Positive for back pain.   Skin: Negative.    Allergic/Immunologic: Negative.    Neurological: Negative.    Hematological: Negative.    Psychiatric/Behavioral: Negative.         Past Medical History:  has a past medical history of Dementia, Depression, Hypertension, Rectal polyp, and Stroke  (CMS/[facility]).    Past Surgical History:  has a past surgical history that includes Appendectomy; Hysterectomy; Cataract extraction; Rotator cuff repair (Right); Knee surgery (Bilateral); Dilation and curettage, diagnostic / therapeutic; Oophorectomy; and Colonoscopy (07/14/2014).    Family History: family history includes Colon cancer in her paternal grandmother; No Known Problems in her father and mother.    Social History:  reports that she has never smoked. She has never used smokeless tobacco. She reports that she does not drink alcohol or use drugs.    Allergies: Adhesive tape; Atorvastatin; Codeine; Gabapentin; Lortab [hydrocodone-acetaminophen]; Morphine and related; Morphine sulfate; Oxycodone-acetaminophen; Percocet [oxycodone-acetaminophen]; and Pregabalin    Home Medications: No current facility-administered medications for this encounter.     Medications: Scheduled Meds:  Continuous Infusions:  No current facility-administered medications for this encounter.   PRN Meds:.    Vital Signs  Temp:  [97.8 °F (36.6 °C)] 97.8 °F (36.6 °C)  Heart Rate:  [62] 62  Resp:  [18] 18  BP: (133)/(60) 133/60    Physical Exam  Physical Exam   Constitutional: She is oriented to person, place, and time. She appears well-developed and well-nourished.   HENT:   Head: Normocephalic and atraumatic.   Eyes: EOM are normal. Pupils are equal, round, and reactive to light.   Neck: Normal range of motion. Neck supple.   Pulmonary/Chest: Effort normal and breath sounds normal.   Abdominal: Soft.   Musculoskeletal: Normal range of motion.   Neurological: She is oriented to person, place, and time. She has a normal Finger-Nose-Finger Test, a normal Heel to Serna Test and a normal Tandem Gait Test.   Skin: Skin is warm and dry.   Psychiatric: Her speech is normal.       Neurologic Exam     Mental Status   Oriented to person, place, and time.   Registration: recalls 3 of 3 objects. Recall at 5 minutes: recalls 3 of 3 objects.   Attention:  normal. Concentration: normal.   Speech: speech is normal   Knowledge: consistent with education.     Cranial Nerves     CN II   Visual acuity: normal    CN III, IV, VI   Pupils are equal, round, and reactive to light.  Extraocular motions are normal.   Diplopia: none    CN V   Facial sensation intact.   Right corneal reflex: normal  Left corneal reflex: normal    CN VII   Right facial weakness: none  Left facial weakness: none    CN VIII   Hearing: intact    CN IX, X   Palate: symmetric  Right gag reflex: normal  Left gag reflex: normal    CN XI   Right trapezius strength: normal  Left trapezius strength: normal    CN XII   Tongue deviation: none    Motor Exam   Right arm tone: normal  Left arm tone: normal  Right arm pronator drift: absent  Left arm pronator drift: absent  Right leg tone: normal  Left leg tone: normal    Strength   Strength 5/5 except as noted.     Sensory Exam   Light touch normal.   Proprioception normal.     Gait, Coordination, and Reflexes     Gait  Gait: (Deferred given back pain)    Coordination   Finger to nose coordination: normal  Heel to shin coordination: normal  Tandem walking coordination: normal    Reflexes   Reflexes 2+ except as noted.   Right plantar: normal  Left plantar: normal  Right Calderon: absent  Left Calderon: absent      Results Review:   Independent review and interpretation of imaging  Imaging Results (last 24 hours)     ** No results found for the last 24 hours. **        MRI brain:  MRI spine:   CT Head:  CT c-spine:  CT t-spine: Noncontrast thoracic CT reviewed and sagittal, coronal, and axial views.  There is evidence of an L1 compression fracture.      CT l-spine: Noncontrast lumbar CT redemonstrates L1 compression fracture.  There is minimal retropulsion.  No evidence of posterior element involvement.  Good alignment.  Preservation of lumbar lordosis.  Relatively minor degenerative changes given her age.    X-ray:    I reviewed the patient's new clinical  results.  Lab Results (last 24 hours)     ** No results found for the last 24 hours. **          Marco Sandy MD

## 2019-06-13 NOTE — PLAN OF CARE
Problem: Patient Care Overview  Goal: Plan of Care Review  Outcome: Ongoing (interventions implemented as appropriate)   06/13/19 7564   Coping/Psychosocial   Plan of Care Reviewed With patient;family   Coping/Psychosocial   Patient Agreement with Plan of Care agrees   Plan of Care Review   Progress no change   OTHER   Outcome Summary Pt fell this am am at home and taken to [facility] ED, then transferred here. Pt c/o back pain, Prn meds controlling pain. To recieve brace after MRI done.VSS. Family at bedside       Problem: Fall Risk (Adult)  Goal: Identify Related Risk Factors and Signs and Symptoms  Outcome: Ongoing (interventions implemented as appropriate)    Goal: Absence of Fall  Outcome: Ongoing (interventions implemented as appropriate)      Problem: Pain, Chronic (Adult)  Goal: Identify Related Risk Factors and Signs and Symptoms  Outcome: Ongoing (interventions implemented as appropriate)    Goal: Acceptable Pain/Comfort Level and Functional Ability  Outcome: Ongoing (interventions implemented as appropriate)

## 2019-06-14 ENCOUNTER — APPOINTMENT (OUTPATIENT)
Dept: GENERAL RADIOLOGY | Facility: HOSPITAL | Age: 84
End: 2019-06-14

## 2019-06-14 PROCEDURE — 97161 PT EVAL LOW COMPLEX 20 MIN: CPT | Performed by: PHYSICAL THERAPIST

## 2019-06-14 PROCEDURE — 72100 X-RAY EXAM L-S SPINE 2/3 VWS: CPT

## 2019-06-14 PROCEDURE — G0378 HOSPITAL OBSERVATION PER HR: HCPCS

## 2019-06-14 PROCEDURE — 97116 GAIT TRAINING THERAPY: CPT

## 2019-06-14 PROCEDURE — 99224 PR SBSQ OBSERVATION CARE/DAY 15 MINUTES: CPT | Performed by: NURSE PRACTITIONER

## 2019-06-14 PROCEDURE — 94799 UNLISTED PULMONARY SVC/PX: CPT

## 2019-06-14 PROCEDURE — 94760 N-INVAS EAR/PLS OXIMETRY 1: CPT

## 2019-06-14 PROCEDURE — 97165 OT EVAL LOW COMPLEX 30 MIN: CPT

## 2019-06-14 RX ORDER — MEPERIDINE HYDROCHLORIDE 50 MG/1
50 TABLET ORAL EVERY 4 HOURS PRN
Status: DISCONTINUED | OUTPATIENT
Start: 2019-06-14 | End: 2019-06-15 | Stop reason: HOSPADM

## 2019-06-14 RX ADMIN — MEPERIDINE HYDROCHLORIDE 50 MG: 50 TABLET ORAL at 03:03

## 2019-06-14 RX ADMIN — MEPERIDINE HYDROCHLORIDE 50 MG: 50 TABLET ORAL at 09:27

## 2019-06-14 RX ADMIN — SODIUM CHLORIDE, PRESERVATIVE FREE 3 ML: 5 INJECTION INTRAVENOUS at 09:30

## 2019-06-14 RX ADMIN — MEMANTINE HYDROCHLORIDE 10 MG: 5 TABLET, FILM COATED ORAL at 20:04

## 2019-06-14 RX ADMIN — AMLODIPINE BESYLATE 5 MG: 5 TABLET ORAL at 09:27

## 2019-06-14 RX ADMIN — DOCUSATE SODIUM 250 MG: 50 LIQUID ORAL at 09:28

## 2019-06-14 RX ADMIN — RIVAROXABAN 20 MG: 20 TABLET, FILM COATED ORAL at 18:13

## 2019-06-14 RX ADMIN — LISINOPRIL: 10 TABLET ORAL at 09:26

## 2019-06-14 RX ADMIN — FAMOTIDINE 40 MG: 20 TABLET, FILM COATED ORAL at 09:26

## 2019-06-14 RX ADMIN — PAROXETINE HYDROCHLORIDE 20 MG: 20 TABLET, FILM COATED ORAL at 09:27

## 2019-06-14 RX ADMIN — MEPERIDINE HYDROCHLORIDE 50 MG: 50 TABLET ORAL at 22:24

## 2019-06-14 RX ADMIN — MEPERIDINE HYDROCHLORIDE 50 MG: 50 TABLET ORAL at 14:03

## 2019-06-14 RX ADMIN — MEMANTINE HYDROCHLORIDE 10 MG: 5 TABLET, FILM COATED ORAL at 13:41

## 2019-06-14 RX ADMIN — SODIUM CHLORIDE, PRESERVATIVE FREE 3 ML: 5 INJECTION INTRAVENOUS at 20:09

## 2019-06-14 NOTE — THERAPY TREATMENT NOTE
Acute Care - Physical Therapy Treatment Note  [facility]     Patient Name: Rupa Hernandez  : 1935  MRN: [id number]  Today's Date: 2019  Onset of Illness/Injury or Date of Surgery: 19  Date of Referral to PT: 19  Referring Physician: SHIV Mendieta    Admit Date: 2019    Visit Dx:    ICD-10-CM ICD-9-CM   1. Impaired mobility Z74.09 799.89   2. Impaired mobility and ADLs Z74.09 799.89     Patient Active Problem List   Diagnosis   • Essential hypertension   • Mixed hyperlipidemia   • Cerebrovascular accident (CVA) (CMS/[facility])   • PAF (paroxysmal atrial fibrillation) (CMS/[facility])   • Vascular dementia without behavioral disturbance   • Depression   • Dementia   • Cerebrovascular small vessel disease   • Nonrheumatic aortic valve stenosis Mild  Echo   • Hypertensive left ventricular hypertrophy, without heart failure Mild to moderate  Echo   • Lumbar compression fracture (CMS/[facility])       Therapy Treatment    Rehabilitation Treatment Summary     Row Name 19 1322             Treatment Time/Intention    Discipline  physical therapy assistant  -      Document Type  therapy note (daily note)  -2      Subjective Information  complains of;pain  -2      Patient/Family Observations  SPOUSE  -2      Existing Precautions/Restrictions  fall;brace worn when out of bed;spinal  -2      Treatment Considerations/Comments  LSO  -2      Recorded by [] Valerie Inman, [facility] 19 1322  [2] Valerie Inman, [facility] 19 1346      Row Name 19 1322             Bed Mobility Assessment/Treatment    Bed Mobility Assessment/Treatment  sidelying-sit;sit-sidelying  -      Sidelying-Sit [place] (Bed Mobility)  -- CHAIR  -      Sit-Sidelying [place] (Bed Mobility)  minimum assist (75% patient effort);moderate assist (50% patient effort);verbal cues  -      Recorded by [] Valerie Inman, [facility] 19 1346      Row Name 19 1322             Sit-Stand Transfer    Sit-Stand  [place] (Transfers)  minimum assist (75% patient effort);verbal cues  -      Assistive Device (Sit-Stand Transfers)  walker, front-wheeled  -      Recorded by [] Valerie Inman, [facility] 06/14/19 1346      Row Name 06/14/19 1322             Stand-Sit Transfer    Stand-Sit [place] (Transfers)  minimum assist (75% patient effort);contact guard;verbal cues  -      Recorded by [] Valerie Inman, [facility] 06/14/19 1346      Row Name 06/14/19 1322             Gait/Stairs Assessment/Training    [place] Level (Gait)  contact guard;verbal cues  -      Assistive Device (Gait)  walker, front-wheeled  -      Distance in Feet (Gait)  50  -      Recorded by [] Valerie Inman, [facility] 06/14/19 1346      Row Name 06/14/19 1322             Positioning and Restraints    Pre-Treatment Position  sitting in chair/recliner  -      Post Treatment Position  bed  -      In Bed  supine;call light within reach;encouraged to call for assist;with family/caregiver;with nsg;SCD pump applied  -      Recorded by [] Valerie Inman, [facility] 06/14/19 1346      Row Name 06/14/19 1322             Pain Assessment    Additional Documentation  Pain Scale: FACES Pre/Post-Treatment (Group)  -      Recorded by [] Valerie Inman, [facility] 06/14/19 1346      Row Name 06/14/19 1322             Pain Scale: Numbers Pre/Post-Treatment    Pain Location  back  -      Pain Intervention(s)  Repositioned;Ambulation/increased activity  -      Recorded by [] Valerie Inman, [facility] 06/14/19 1346      Row Name 06/14/19 1322             Pain Scale: FACES Pre/Post-Treatment    Pain: FACES Scale, Pretreatment  0-->no hurt at rest  -      Pain: FACES Scale, Post-Treatment  6-->hurts even more with mobility  -      Recorded by [] Valerie Inman, [facility] 06/14/19 1346      Row Name 06/14/19 1346             Spinal Orthosis Management    Type (Spinal Orthosis)  LSO (lumbar sacral orthosis)  -      Wearing Schedule (Spinal Orthosis)  wear when  out of bed only  -      Recorded by [] Valerie Inman, PTA 06/14/19 7968        User Key  (r) = Recorded By, (t) = Taken By, (c) = Cosigned By    Initials Name Effective Dates Discipline     Valerie Inman, PTA 08/02/16 -  PT               Rehab Goal Summary     Row Name 06/14/19 0920 06/14/19 0740          Physical Therapy Goals    Bed Mobility Goal Selection (PT)  bed mobility, PT goal 1  -SB  --     Transfer Goal Selection (PT)  transfer, PT goal 1  -SB  --     Gait Training Goal Selection (PT)  gait training, PT goal 1  -SB  --        Bed Mobility Goal 1 (PT)    Activity/Assistive Device (Bed Mobility Goal 1, PT)  sidelying to sit;sit to sidelying;rolling to right;rolling to left  -SB  --     [place] Level/Cues Needed (Bed Mobility Goal 1, PT)  contact guard assist  -SB  --     Time Frame (Bed Mobility Goal 1, PT)  by discharge  -SB  --     Progress/Outcomes (Bed Mobility Goal 1, PT)  goal ongoing  -SB  --        Transfer Goal 1 (PT)    Activity/Assistive Device (Transfer Goal 1, PT)  sit-to-stand/stand-to-sit;bed-to-chair/chair-to-bed;walker, rolling  -SB  --     [place] Level/Cues Needed (Transfer Goal 1, PT)  contact guard assist  -SB  --     Time Frame (Transfer Goal 1, PT)  by discharge  -SB  --     Progress/Outcome (Transfer Goal 1, PT)  goal ongoing  -SB  --        Gait Training Goal 1 (PT)    Activity/Assistive Device (Gait Training Goal 1, PT)  gait (walking locomotion);decrease fall risk;increase endurance/gait distance;improve balance and speed;decrease asymmetrical patterns;assistive device use  -SB  --     [place] Level (Gait Training Goal 1, PT)  contact guard assist  -SB  --     Distance (Gait Goal 1, PT)  75  -SB  --     Time Frame (Gait Training Goal 1, PT)  by discharge  -SB  --     Progress/Outcome (Gait Training Goal 1, PT)  goal ongoing  -SB  --        Occupational Therapy Goals    Bed Mobility Goal Selection (OT)  --  bed mobility, OT goal 1  (Pended)   [facility]      Bathing Goal Selection (OT)  --  bathing, OT goal 1  (Pended)   -     Dressing Goal Selection (OT)  --  dressing, OT goal 1  (Pended)   -     Toileting Goal Selection (OT)  --  toileting, OT goal 1  (Pended)   -        Bed Mobility Goal 1 (OT)    Activity/Assistive Device (Bed Mobility Goal 1, OT)  --  rolling to left;rolling to right;sidelying to sit/sit to sidelying;bed rails  (Pended)   -     [place] Level/Cues Needed (Bed Mobility Goal 1, OT)  --  supervision required  (Pended)   -     Time Frame (Bed Mobility Goal 1, OT)  --  long term goal (LTG);by discharge  (Pended)   -     Barriers (Bed Mobility Goal 1, OT)  --  .  (Pended)   -     Progress/Outcomes (Bed Mobility Goal 1, OT)  --  goal ongoing  (Pended)   -        Bathing Goal 1 (OT)    Activity/Assistive Device (Bathing Goal 1, OT)  --  upper body bathing;lower body bathing;grab bar/tub rail;hand-held shower spray hose;long-handled sponge;shower chair;tub bench  (Pended)   -     [place] Level/Cues Needed (Bathing Goal 1, OT)  --  minimum assist (75% or more patient effort)  (Pended)   -     Time Frame (Bathing Goal 1, OT)  --  long term goal (LTG);by discharge  (Pended)   -     Barriers (Bathing Goal 1, OT)  --  .  (Pended)   -     Progress/Outcomes (Bathing Goal 1, OT)  --  goal ongoing  (Pended)   -        Dressing Goal 1 (OT)    Activity/Assistive Device (Dressing Goal 1, OT)  --  upper body dressing;lower body dressing;sock-aid  (Pended)   -     [place]/Cues Needed (Dressing Goal 1, OT)  --  supervision required  (Pended)   -     Time Frame (Dressing Goal 1, OT)  --  long term goal (LTG);by discharge  (Pended)   -     Barriers (Dressing Goal 1, OT)  --  .  (Pended)   -     Progress/Outcome (Dressing Goal 1, OT)  --  goal ongoing  (Pended)   -        Toileting Goal 1 (OT)    Activity/Device (Toileting Goal 1, OT)  --  toileting skills, all  (Pended)   -LH     [place] Level/Cues Needed (Toileting  Goal 1, OT)  --  minimum assist (75% or more patient effort)  (Pended)   -     Time Frame (Toileting Goal 1, OT)  --  long term goal (LTG);by discharge  (Pended)   -     Barriers (Toileting Goal 1, OT)  --  .  (Pended)   -     Progress/Outcome (Toileting Goal 1, OT)  --  goal ongoing  (Pended)   -       User Key  (r) = Recorded By, (t) = Taken By, (c) = Cosigned By    Initials Name Provider Type Discipline    SB Ingrid Rice, PT Physical Therapist PT    LH Pedrito Barrow, OT Student OT Student OT          Physical Therapy Education     Title: PT OT SLP Therapies (Done)     Topic: Physical Therapy (Done)     Point: Mobility training (Done)     Learning Progress Summary           Patient Acceptance, E, VU,NR by SB at 6/14/2019  1:13 PM    Comment:  pt edu on POC, benefits of act, spinal precautions and bracing   Family Acceptance, E, VU,NR by SB at 6/14/2019  1:13 PM    Comment:  pt edu on POC, benefits of act, spinal precautions and bracing                   Point: Home exercise program (Done)     Learning Progress Summary           Patient Acceptance, E, VU,NR by SB at 6/14/2019  1:13 PM    Comment:  pt edu on POC, benefits of act, spinal precautions and bracing   Family Acceptance, E, VU,NR by SB at 6/14/2019  1:13 PM    Comment:  pt edu on POC, benefits of act, spinal precautions and bracing                   Point: Body mechanics (Done)     Learning Progress Summary           Patient Acceptance, E, VU,NR by SB at 6/14/2019  1:13 PM    Comment:  pt edu on POC, benefits of act, spinal precautions and bracing   Family Acceptance, E, VU,NR by SB at 6/14/2019  1:13 PM    Comment:  pt edu on POC, benefits of act, spinal precautions and bracing                   Point: Precautions (Done)     Learning Progress Summary           Patient Acceptance, E, VU,NR by SB at 6/14/2019  1:13 PM    Comment:  pt edu on POC, benefits of act, spinal precautions and bracing   Family Acceptance, E, VU,NR by SB at 6/14/2019   1:13 PM    Comment:  pt edu on POC, benefits of act, spinal precautions and bracing                               User Key     Initials Effective Dates Name Provider Type Discipline    SB 08/31/18 -  Ingrid Rice, PT Physical Therapist PT                PT Recommendation and Plan        Outcome Measures     Row Name 06/14/19 1325 06/14/19 1300          How much help from another person do you currently need...    Turning from your back to your side while in flat bed without using bedrails?  --  3  -SB     Moving from lying on back to sitting on the side of a flat bed without bedrails?  --  3  -SB     Moving to and from a bed to a chair (including a wheelchair)?  --  3  -SB     Standing up from a chair using your arms (e.g., wheelchair, bedside chair)?  --  3  -SB     Climbing 3-5 steps with a railing?  --  2  -SB     To walk in hospital room?  --  3  -SB     AM-[facility] 6 Clicks Score  --  17  -SB        How much help from another is currently needed...    Putting on and taking off regular lower body clothing?  1  (Pended)   -  --     Bathing (including washing, rinsing, and drying)  2  (Pended)   -  --     Toileting (which includes using toilet bed pan or urinal)  2  (Pended)   -  --     Putting on and taking off regular upper body clothing  3  (Pended)   -  --     Taking care of personal grooming (such as brushing teeth)  3  (Pended)   -  --     Eating meals  4  (Pended)   -  --     Score  15  (Pended)   -  --        Functional Assessment    Outcome Measure Options  AM-PAC 6 Clicks Daily Activity (OT)  (Pended)   -  AM-[facility] 6 Clicks Basic Mobility (PT)  -SB       User Key  (r) = Recorded By, (t) = Taken By, (c) = Cosigned By    Initials Name Provider Type    SB Ingrid Rice, PT Physical Therapist    LH Pedrito Barrow, OT Student OT Student         Time Calculation:   PT Charges     Row Name 06/14/19 1346 06/14/19 1317          Time Calculation    Start Time  1322  -AH  0920  -SB     Stop Time   1345  -  0958  -SB     Time Calculation (min)  23 min  -  38 min  -SB     PT Received On  --  06/14/19  -SB     PT Goal Re-Cert Due Date  --  06/24/19  -SB        Time Calculation- PT    Total Timed Code Minutes- PT  23 minute(s)  -  --        Timed Charges    44267 - Gait Training Minutes   23  -  --       User Key  (r) = Recorded By, (t) = Taken By, (c) = Cosigned By    Initials Name Provider Type     Valerie Inman, PTA Physical Therapy Assistant    Ingrid Hernández, PT Physical Therapist        Therapy Charges for Today     Code Description Service Date Service Provider Modifiers Qty    [id number] HC GAIT TRAINING EA 15 MIN 6/14/2019 Valerie Inman PTA GP 2          PT G-Codes  Outcome Measure Options: (P) AM-PAC 6 Clicks Daily Activity (OT)  AM-PAC 6 Clicks Score: 17  Score: (P) 15    Valerie Inman PTA  6/14/2019

## 2019-06-14 NOTE — THERAPY EVALUATION
Acute Care - Physical Therapy Initial Evaluation  [facility]     Patient Name: Rupa Hernandez  : 1935  MRN: [id number]  Today's Date: 2019   Onset of Illness/Injury or Date of Surgery: 19  Date of Referral to PT: 19  Referring Physician: SHIV Mendieta      Admit Date: 2019    Visit Dx:     ICD-10-CM ICD-9-CM   1. Impaired mobility Z74.09 799.89     Patient Active Problem List   Diagnosis   • Essential hypertension   • Mixed hyperlipidemia   • Cerebrovascular accident (CVA) (CMS/[facility])   • PAF (paroxysmal atrial fibrillation) (CMS/[facility])   • Vascular dementia without behavioral disturbance   • Depression   • Dementia   • Cerebrovascular small vessel disease   • Nonrheumatic aortic valve stenosis Mild  Echo   • Hypertensive left ventricular hypertrophy, without heart failure Mild to moderate  Echo   • Lumbar compression fracture (CMS/[facility])     Past Medical History:   Diagnosis Date   • Dementia    • Depression    • Hypertension    • Rectal polyp    • Stroke (CMS/[facility])      Past Surgical History:   Procedure Laterality Date   • APPENDECTOMY     • CATARACT EXTRACTION     • COLONOSCOPY  2014    diverticulosis   • DILATION AND CURETTAGE, DIAGNOSTIC / THERAPEUTIC     • HYSTERECTOMY     • KNEE SURGERY Bilateral     X2   • OOPHORECTOMY     • ROTATOR CUFF REPAIR Right         PT ASSESSMENT (last 12 hours)      Physical Therapy Evaluation     Row Name 19 0920          PT Evaluation Time/Intention    Subjective Information  complains of;pain  -SB     Document Type  evaluation  -SB     Mode of Treatment  physical therapy  -SB     Patient Effort  good  -SB     Symptoms Noted During/After Treatment  increased pain  -SB     Row Name 19 0920          General Information    Patient Profile Reviewed?  yes  -SB     Onset of Illness/Injury or Date of Surgery  19  -SB     Referring Physician  SHIV Mendieta  -SB     Patient Observations  alert;cooperative;agree to therapy  -SB      Patient/Family Observations  spouse and sons present  -SB     General Observations of Patient  fowlers in bed, SCDs, no distress  -SB     Prior Level of Function  independent:;all household mobility  -SB     Equipment Currently Used at Home  grab bar;shower chair;walker, rolling  -SB     Pertinent History of Current Functional Problem  L1 compression fx without neuro compression  -SB     Existing Precautions/Restrictions  fall;spinal  -SB     Limitations/Impairments  safety/cognitive  -SB     Equipment Issued to Patient  gait belt;walker, front wheeled  -SB     Risks Reviewed  patient:;spouse/S.O.:;nausea/vomiting;LOB;increased discomfort;dizziness;change in vital signs;increased drainage;lines disloged;family:  -SB     Benefits Reviewed  patient:;spouse/S.O.:;family:;improve function;increase independence;increase strength;decrease risk of DVT;decrease pain;increase balance;improve skin integrity;increase knowledge  -SB     Barriers to Rehab  cognitive status  -SB     Row Name 06/14/19 0920          Relationship/Environment    Primary Source of Support/Comfort  spouse  -SB     Lives With  spouse  -SB     Row Name 06/14/19 0920          Resource/Environmental Concerns    Current Living Arrangements  home/apartment/condo  -SB     Resource/Environmental Concerns  none  -SB     Transportation Concerns  car, none  -SB     Row Name 06/14/19 0920          Home Main Entrance    Number of Stairs, Main Entrance  two  -SB     Stair Railings, Main Entrance  none  -SB     Row Name 06/14/19 0920          Cognitive Assessment/Interventions    Additional Documentation  Cognitive Assessment/Intervention (Group)  -SB     Row Name 06/14/19 0920          Cognitive Assessment/Intervention- PT/OT    Affect/Mental Status (Cognitive)  confused  -SB     Orientation Status (Cognition)  oriented to;person;disoriented to;place;situation;time  -SB     Follows Commands (Cognition)  follows two step commands;75-90% accuracy  -SB     Cognitive  Function (Cognitive)  memory deficit;safety deficit  -SB     Memory Deficit (Cognitive)  severe deficit;immediate recall;recall, recent events;prospective memory  -SB     Safety Deficit (Cognitive)  moderate deficit;awareness of need for assistance;insight into deficits/self awareness;judgment;problem solving;safety precautions awareness  -SB     Personal Safety Interventions  fall prevention program maintained;elopement precautions initiated;gait belt;muscle strengthening facilitated;nonskid shoes/slippers when out of bed;supervised activity  -SB     Row Name 06/14/19 0920          Safety Issues, Functional Mobility    Safety Issues Affecting Function (Mobility)  insight into deficits/self awareness;judgment;positioning of assistive device;problem solving;safety precaution awareness;awareness of need for assistance  -SB     Impairments Affecting Function (Mobility)  cognition;endurance/activity tolerance;pain;strength  -SB     Row Name 06/14/19 0920          Bed Mobility Assessment/Treatment    Bed Mobility Assessment/Treatment  rolling left;sidelying-sit  -SB     Rolling Left [place] (Bed Mobility)  minimum assist (75% patient effort);2 person assist  -SB     Sidelying-Sit [place] (Bed Mobility)  minimum assist (75% patient effort)  -SB     Assistive Device (Bed Mobility)  bed rails  -SB     Row Name 06/14/19 0920          Transfer Assessment/Treatment    Transfer Assessment/Treatment  sit-stand transfer;stand-sit transfer  -SB     Sit-Stand [place] (Transfers)  minimum assist (75% patient effort);1 person assist  -SB     Stand-Sit [place] (Transfers)  minimum assist (75% patient effort);1 person assist  -SB     Row Name 06/14/19 0920          Sit-Stand Transfer    Assistive Device (Sit-Stand Transfers)  walker, front-wheeled  -SB     Row Name 06/14/19 0920          Stand-Sit Transfer    Assistive Device (Stand-Sit Transfers)  walker, front-wheeled  -SB     Row Name 06/14/19 0920           Gait/Stairs Assessment/Training    Gait/Stairs Assessment/Training  gait/ambulation independence;gait/ambulation assistive device;distance ambulated  -SB     [place] Level (Gait)  contact guard  -SB     Assistive Device (Gait)  walker, front-wheeled  -SB     Distance in Feet (Gait)  50  -SB     Pattern (Gait)  step-through  -SB     Row Name 06/14/19 0920          General ROM    GENERAL ROM COMMENTS  BLE WFL  -SB     Row Name 06/14/19 0920          MMT (Manual Muscle Testing)    General MMT Comments  BLE 4/5  -SB     Row Name 06/14/19 0920          Static Sitting Balance    Level of [place] (Unsupported Sitting, Static Balance)  supervision  -SB     Sitting Position (Unsupported Sitting, Static Balance)  sitting on edge of bed  -SB     Row Name 06/14/19 0920          Static Standing Balance    Level of [place] (Supported Standing, Static Balance)  contact guard assist  -SB     Assistive Device Utilized (Supported Standing, Static Balance)  walker, rolling  -SB     Row Name 06/14/19 0920          Sensory Assessment/Intervention    Sensory General Assessment  no sensation deficits identified  -SB     Row Name 06/14/19 0920          Pain Scale: Numbers Pre/Post-Treatment    Pain Scale: Numbers, Pretreatment  8/10  -SB     Pain Scale: Numbers, Post-Treatment  8/10  -SB     Pain Location  back  -SB     Pain Intervention(s)  Repositioned;Ambulation/increased activity;Medication (See MAR)  -SB     Row Name 06/14/19 0920          Orthotics & Prosthetics Management    Orthosis Location  spinal orthosis  -SB     Row Name 06/14/19 0920          Spinal Orthosis Management    Type (Spinal Orthosis)  LSO (lumbar sacral orthosis)  -SB     Fabrication Comment (Spinal Orthosis)  in room  -SB     Functional Design (Spinal Orthosis)  static orthosis  -SB     Therapeutic Indications (Spinal Orthosis)  fracture immobilization;rest/inflammation reduction;stabilization and support  -SB     Wearing Schedule (Spinal Orthosis)   wear when out of bed only  -SB     Orthosis Training (Spinal Orthosis)  patient;caregiver;all orthosis skills;activity limitations/precautions  -SB     Compliance/Wearing Issues (Spinal Orthosis)  patient/caregiver comprehend strategies;patient/caregiver comprehend rationale for orthosis;follow-up training required  -SB     Row Name 06/14/19 0920          Plan of Care Review    Plan of Care Reviewed With  spouse;patient  -SB     Row Name 06/14/19 0920          Physical Therapy Clinical Impression    Date of Referral to PT  06/13/19  -SB     Patient/Family Goals Statement (PT Clinical Impression)  get therapy  -SB     Criteria for Skilled Interventions Met (PT Clinical Impression)  yes  -SB     Rehab Potential (PT Clinical Summary)  good, to achieve stated therapy goals  -SB     Predicted Duration of Therapy (PT)  until d/c  -SB     Care Plan Review (PT)  evaluation/treatment results reviewed;care plan/treatment goals reviewed;risks/benefits reviewed;current/potential barriers reviewed;patient/other agree to care plan  -SB     Care Plan Review, Other Participant (PT Clinical Impression)  spouse;son  -SB     Row Name 06/14/19 0920          Physical Therapy Goals    Bed Mobility Goal Selection (PT)  bed mobility, PT goal 1  -SB     Transfer Goal Selection (PT)  transfer, PT goal 1  -SB     Gait Training Goal Selection (PT)  gait training, PT goal 1  -SB     Row Name 06/14/19 0920          Bed Mobility Goal 1 (PT)    Activity/Assistive Device (Bed Mobility Goal 1, PT)  sidelying to sit;sit to sidelying;rolling to right;rolling to left  -SB     [place] Level/Cues Needed (Bed Mobility Goal 1, PT)  contact guard assist  -SB     Time Frame (Bed Mobility Goal 1, PT)  by discharge  -SB     Progress/Outcomes (Bed Mobility Goal 1, PT)  goal ongoing  -SB     Row Name 06/14/19 0920          Transfer Goal 1 (PT)    Activity/Assistive Device (Transfer Goal 1, PT)  sit-to-stand/stand-to-sit;bed-to-chair/chair-to-bed;walker,  rolling  -SB     [place] Level/Cues Needed (Transfer Goal 1, PT)  contact guard assist  -SB     Time Frame (Transfer Goal 1, PT)  by discharge  -SB     Progress/Outcome (Transfer Goal 1, PT)  goal ongoing  -SB     Row Name 06/14/19 0920          Gait Training Goal 1 (PT)    Activity/Assistive Device (Gait Training Goal 1, PT)  gait (walking locomotion);decrease fall risk;increase endurance/gait distance;improve balance and speed;decrease asymmetrical patterns;assistive device use  -SB     [place] Level (Gait Training Goal 1, PT)  contact guard assist  -SB     Distance (Gait Goal 1, PT)  75  -SB     Time Frame (Gait Training Goal 1, PT)  by discharge  -SB     Progress/Outcome (Gait Training Goal 1, PT)  goal ongoing  -SB     Row Name 06/14/19 0920          Positioning and Restraints    Pre-Treatment Position  in bed  -SB     Post Treatment Position  chair  -SB     In Chair  sitting;call light within reach;encouraged to call for assist;exit alarm on;with family/caregiver  -SB     Row Name 06/14/19 0920          Living Environment    Home Accessibility  stairs to enter home;tub/shower is not walk in  -SB       User Key  (r) = Recorded By, (t) = Taken By, (c) = Cosigned By    Initials Name Provider Type    Ingrid Hernández PT Physical Therapist        Physical Therapy Education     Title: PT OT SLP Therapies (Done)     Topic: Physical Therapy (Done)     Point: Mobility training (Done)     Learning Progress Summary           Patient Acceptance, E, VU,NR by SB at 6/14/2019  1:13 PM    Comment:  pt edu on POC, benefits of act, spinal precautions and bracing   Family Acceptance, E, VU,NR by SB at 6/14/2019  1:13 PM    Comment:  pt edu on POC, benefits of act, spinal precautions and bracing                   Point: Home exercise program (Done)     Learning Progress Summary           Patient Acceptance, E, VU,NR by SB at 6/14/2019  1:13 PM    Comment:  pt edu on POC, benefits of act, spinal precautions and bracing    Family Acceptance, E, VU,NR by SB at 6/14/2019  1:13 PM    Comment:  pt edu on POC, benefits of act, spinal precautions and bracing                   Point: Body mechanics (Done)     Learning Progress Summary           Patient Acceptance, E, VU,NR by SB at 6/14/2019  1:13 PM    Comment:  pt edu on POC, benefits of act, spinal precautions and bracing   Family Acceptance, E, VU,NR by SB at 6/14/2019  1:13 PM    Comment:  pt edu on POC, benefits of act, spinal precautions and bracing                   Point: Precautions (Done)     Learning Progress Summary           Patient Acceptance, E, VU,NR by SB at 6/14/2019  1:13 PM    Comment:  pt edu on POC, benefits of act, spinal precautions and bracing   Family Acceptance, E, VU,NR by SB at 6/14/2019  1:13 PM    Comment:  pt edu on POC, benefits of act, spinal precautions and bracing                               User Key     Initials Effective Dates Name Provider Type Discipline    SB 08/31/18 -  Ingrid Rice, PT Physical Therapist PT              PT Recommendation and Plan  Anticipated Discharge Disposition (PT): inpatient rehabilitation facility, other (see comments), skilled nursing facility(swing bed)  Planned Therapy Interventions (PT Eval): balance training, bed mobility training, gait training, home exercise program, orthotic fitting/training, patient/family education, ROM (range of motion), strengthening, transfer training  Therapy Frequency (PT Clinical Impression): 2 times/day  Outcome Summary/Treatment Plan (PT)  Anticipated Discharge Disposition (PT): inpatient rehabilitation facility, other (see comments), skilled nursing facility(swing bed)  Plan of Care Reviewed With: patient, spouse, son  Progress: no change  Outcome Summary: PT eval completed. Pt oriented to self only. Pt nad  educated on log rolling, spinal precautions and bracing. Pt needed verbal cues and min A x2 for bed mobility and sit to stand t/f. Pt ambulated 50 feet with CGA and RW, and  needed cues for proper use of AD. Pt will benefit from skilled PT to improve functional mobility and independence. Recommend d/c to IRF vs SNF vs swing bed pending progress.  Outcome Measures     Row Name 06/14/19 1300             How much help from another person do you currently need...    Turning from your back to your side while in flat bed without using bedrails?  3  -SB      Moving from lying on back to sitting on the side of a flat bed without bedrails?  3  -SB      Moving to and from a bed to a chair (including a wheelchair)?  3  -SB      Standing up from a chair using your arms (e.g., wheelchair, bedside chair)?  3  -SB      Climbing 3-5 steps with a railing?  2  -SB      To walk in hospital room?  3  -SB      AM-PAC 6 Clicks Score  17  -SB         Functional Assessment    Outcome Measure Options  AM-PAC 6 Clicks Basic Mobility (PT)  -SB        User Key  (r) = Recorded By, (t) = Taken By, (c) = Cosigned By    Initials Name Provider Type    Ingrid Hernández PT Physical Therapist         Time Calculation:   PT Charges     Row Name 06/14/19 1317             Time Calculation    Start Time  0920  -SB      Stop Time  0958  -SB      Time Calculation (min)  38 min  -SB      PT Received On  06/14/19  -SB      PT Goal Re-Cert Due Date  06/24/19  -SB        User Key  (r) = Recorded By, (t) = Taken By, (c) = Cosigned By    Initials Name Provider Type    Ingrid Hernández PT Physical Therapist        Therapy Charges for Today     Code Description Service Date Service Provider Modifiers Qty    [id number] HC PT EVAL LOW COMPLEXITY 3 6/14/2019 Ingrid Rice PT GP 1          PT G-Codes  Outcome Measure Options: AM-PAC 6 Clicks Basic Mobility (PT)  AM-PAC 6 Clicks Score: 17      Ingrid Rice PT  6/14/2019

## 2019-06-14 NOTE — PROGRESS NOTES
"Neurosurgery Daily Progress Note    Assessment:   Rupa Hernandez is a 83 y.o. with a significant PMH of significant comorbidity dementia currently on blood thinners.  She presents with a new problem of falling from standing height. Physical exam findings of thoracolumbar point tenderness but otherwise neurologically intact.  Their imaging shows relatively minor L1 compression fracture.      DDX:     Lumbar compression fracture (CMS/HCC)    Patient Active Problem List   Diagnosis   • Essential hypertension   • Mixed hyperlipidemia   • Cerebrovascular accident (CVA) (CMS/HCC)   • PAF (paroxysmal atrial fibrillation) (CMS/HCC)   • Vascular dementia without behavioral disturbance   • Depression   • Dementia   • Cerebrovascular small vessel disease   • Nonrheumatic aortic valve stenosis Mild 2015 Echo   • Hypertensive left ventricular hypertrophy, without heart failure Mild to moderate 2015 Echo   • Lumbar compression fracture (CMS/HCC)     Plan:   Neuro: Stable.  Neuro intact   MRI shows acute compression fracture of the superior endplate of L1.  Appears stable   LSO brace at all times when out of bed   X-ray of the lumbar spine, in brace, upright and supine  CV: IDALIA.  Pulm: IDALIA.  Continuous pulse oximetry  : IDALIA.  Voiding spontaneously  FEN: IDALIA.  Tolerating oral diet  GI: IDALIA.  ID: IDALIA.  Heme:  DVT prophylaxis  Pain: Tolerable.  Waxes and wanes.  Will shorten duration of Demerol  Dispo: PT/OT    Chief complaint:   Back pain post fall    Subjective  Symptoms stable.  Doing well    Temp:  [97.4 °F (36.3 °C)-99 °F (37.2 °C)] 98.3 °F (36.8 °C)  Heart Rate:  [70-78] 78  Resp:  [18-20] 18  BP: (126-156)/(40-91) 156/91    Objective:  General Appearance:  Comfortable, well-appearing and in no acute distress.    Vital signs: (most recent): Blood pressure 156/91, pulse 78, temperature 98.3 °F (36.8 °C), temperature source Oral, resp. rate 18, height 157.5 cm (62\"), weight 89.1 kg (196 lb 6.4 oz), SpO2 91 %.        Neurologic " Exam     Mental Status   Oriented to person, place, and time.   Attention: normal. Concentration: normal.   Speech: speech is normal   Level of consciousness: alert  Follow simple commands     Cranial Nerves     CN II   Visual fields full to confrontation.     CN III, IV, VI   Pupils are equal, round, and reactive to light.  Extraocular motions are normal.     CN V   Facial sensation intact.     CN VII   Facial expression full, symmetric.     CN VIII   CN VIII normal.     CN IX, X   CN IX normal.     CN XI   CN XI normal.     Motor Exam   Muscle bulk: normal  Overall muscle tone: normal  Right arm tone: normal  Left arm tone: normal  Right arm pronator drift: absent  Left arm pronator drift: absent  Right leg tone: normal  Left leg tone: normal    Strength   Right deltoid: 5/5  Left deltoid: 5/5  Right biceps: 5/5  Left biceps: 5/5  Right triceps: 5/5  Left triceps: 5/5  Right wrist extension: 5/5  Left wrist extension: 5/5  Right iliopsoas: 5/5  Left iliopsoas: 5/5  Right quadriceps: 5/5  Left quadriceps: 5/5  Right anterior tibial: 5/5  Left anterior tibial: 5/5  Right posterior tibial: 5/5  Left posterior tibial: 5/5    Sensory Exam   Right arm light touch: normal  Left arm light touch: normal  Right leg light touch: normal  Left leg light touch: normal    Gait, Coordination, and Reflexes     Tremor   Resting tremor: absent  Intention tremor: absent  Action tremor: absent    Reflexes   Right plantar: normal  Left plantar: normal  Right Calderon: absent  Left Calderon: absent  Right ankle clonus: absent  Left ankle clonus: absent  Right pendular knee jerk: absent  Left pendular knee jerk: absent    Drains:  NA    Imaging Results (last 24 hours)     Procedure Component Value Units Date/Time    MRI Lumbar Spine Without Contrast [[id number]] Collected:  06/13/19 1630     Updated:  06/13/19 1645    Narrative:       HISTORY: L1 compression fracture     MRI lumbar spine: Sagittal and axial images lumbar spine  obtained  without contrast.     The alignment of the lumbar spine is appropriate. There is an  acute/subacute compression fracture of the superior endplate of L2  possibly 30%. There is only minimal retropulsion of posterior superior  endplate of L1 by 2 mm. There is no additional compression deformity.  There is mild endplate spurring. No suspicious focal bony mass is  visualized.     The conus medullaris terminates at the L1 level.     There is moderate facet osteoarthropathy with ligamentum flavum  hypertrophy.     At L1, there is only borderline central canal stenosis from the minimal  retropulsion.     At L1/L2, there is no central canal stenosis or neural foramen  narrowing.     At L2/L3, there is moderate facet osteoarthropathy minimal posterior  disc bulging. There is mild central canal stenosis. No neural foramen  narrowing.     At L3/L4, there is moderate facet arthropathy. There is mild posterior  disc bulging with a slight right foraminal predominance. There is mild  central canal stenosis with mild right neural foramen narrowing.     At L4/L5, there is moderate facet arthropathy mild posterior disc  bulging with a mild right foraminal predominance. There is mild central  canal stenosis with mild right neural foramen narrowing at this level.     At L5-S1, moderate facet arthropathy with mild bilateral foraminal disc  bulging. There is only borderline central canal stenosis with mild  bilateral neural foramen narrowing.     Cortical renal atrophy suspected.       Impression:       1. Acute/subacute L1 fracture with loss of height by approximately 30%  and only minimal retropulsion by 2 mm.   2. Mild degenerative disc change with moderate facet osteoarthropathy.  There is mild lower lumbar spinal canal stenosis and mild neural foramen  narrowing as described above.        This report was finalized on 06/13/2019 16:42 by Dr. Carol Hernandez MD.    XR Ankle 3+ View Left [[id number]] Collected:  06/13/19 1556      Updated:  06/13/19 1605    Narrative:       EXAM: XR ANKLE 3+ VW LEFT- - 6/13/2019 3:49 PM CDT     HISTORY: ecchymosis       COMPARISON: None.      TECHNIQUE:  3 images.  AP, oblique, lateral views     FINDINGS:    Nonstandard lateral view limits evaluation. Small corticated densities  at the tip of the medial malleolus, possibly an old avulsion. No acute  displaced fracture. No aggressive bony lesion. The visual lies joint  space and alignment is anatomic. Talar dome appears intact. No  radiodense foreign body. Degenerative type Achilles enthesophyte. Soft  tissue swelling.          Impression:       1. Soft tissue swelling. No acute bony finding. Partially limited  evaluation due to the nonstandard lateral view. If persistent clinical  concern, consider repeat lateral view.  2. Possible old avulsion of the medial malleolus.  This report was finalized on 06/13/2019 16:02 by Dr Nano Zaragoza MD.        Lab Results (last 24 hours)     Procedure Component Value Units Date/Time    Comprehensive Metabolic Panel [[id number]]  (Abnormal) Collected:  06/13/19 1201    Specimen:  Blood Updated:  06/13/19 1221     Glucose 159 mg/dL      BUN 31 mg/dL      Creatinine 0.88 mg/dL      Sodium 136 mmol/L      Potassium 4.1 mmol/L      Chloride 99 mmol/L      CO2 32.0 mmol/L      Calcium 9.0 mg/dL      Total Protein 7.0 g/dL      Albumin 3.90 g/dL      ALT (SGPT) 24 U/L      AST (SGOT) 29 U/L      Alkaline Phosphatase 69 U/L      Total Bilirubin 0.4 mg/dL      eGFR Non African Amer 62 mL/min/1.73      Globulin 3.1 gm/dL      A/G Ratio 1.3 g/dL      BUN/Creatinine Ratio 35.2     Anion Gap 5.0 mmol/L     Narrative:       GFR Normal >60  Chronic Kidney Disease <60  Kidney Failure <15    Protime-INR [[id number]]  (Abnormal) Collected:  06/13/19 1201    Specimen:  Blood Updated:  06/13/19 1217     Protime 16.0 Seconds      INR 1.24    aPTT [[id number]]  (Normal) Collected:  06/13/19 1201    Specimen:  Blood Updated:  06/13/19 1217      PTT 32.2 seconds     CBC Auto Differential [[id number]]  (Abnormal) Collected:  06/13/19 1201    Specimen:  Blood Updated:  06/13/19 1209     WBC 10.57 10*3/mm3      RBC 4.38 10*6/mm3      Hemoglobin 12.8 g/dL      Hematocrit 39.0 %      MCV 89.0 fL      MCH 29.2 pg      MCHC 32.8 g/dL      RDW 13.4 %      RDW-SD 43.9 fl      MPV 10.7 fL      Platelets 195 10*3/mm3      Neutrophil % 81.6 %      Lymphocyte % 11.0 %      Monocyte % 6.1 %      Eosinophil % 0.5 %      Basophil % 0.3 %      Immature Grans % 0.5 %      Neutrophils, Absolute 8.63 10*3/mm3      Lymphocytes, Absolute 1.16 10*3/mm3      Monocytes, Absolute 0.65 10*3/mm3      Eosinophils, Absolute 0.05 10*3/mm3      Basophils, Absolute 0.03 10*3/mm3      Immature Grans, Absolute 0.05 10*3/mm3      nRBC 0.0 /100 WBC         [id number]  Pascual Mendieta, APRN

## 2019-06-14 NOTE — PROGRESS NOTES
Discharge Planning Assessment  [facility]     Patient Name: Rupa Hernandez  MRN: [id number]  Today's Date: 6/14/2019    Admit Date: 6/13/2019    Discharge Needs Assessment     Row Name 06/14/19 1216       Living Environment    Lives With  spouse    Current Living Arrangements  home/apartment/condo    Primary Care Provided by  self    Provides Primary Care For  no one    Family Caregiver if Needed  child(mai), adult;spouse    Quality of Family Relationships  helpful;involved;supportive    Able to Return to Prior Arrangements  yes       Resource/Environmental Concerns    Resource/Environmental Concerns  none    Transportation Concerns  car, none       Transition Planning    Patient/Family Anticipates Transition to  -- unknown    Patient/Family Anticipated Services at Transition  none    Transportation Anticipated  family or friend will provide       Discharge Needs Assessment    Readmission Within the Last 30 Days  no previous admission in last 30 days    Concerns to be Addressed  no discharge needs identified;denies needs/concerns at this time    Equipment Currently Used at Home  none    Anticipated Changes Related to Illness  none    Equipment Needed After Discharge  -- unknown    Discharge Coordination/Progress  Pt has RX coverage and a PCP. Discharge plans are unknown at this time. Pt is confused so LUIS F spoke with spouse and son in regards to discharge planning. Pt is currently observation but is not able to ambulate well enough to safely return home. LUIS F spoke with CM and pt is not able to meet inpatient at this time. Pt's spouse is requesting referral be made to [facility]. LUIS F faxed appropriate information and spoke with Garth who is aware of referral. LUIS F will await phone call back from facility.         Discharge Plan    No documentation.       Destination      No service coordination in this encounter.      Durable Medical Equipment      No service coordination in this encounter.      Dialysis/Infusion       No service coordination in this encounter.      Home Medical Care      No service coordination in this encounter.      Therapy      No service coordination in this encounter.      Community Resources      No service coordination in this encounter.          Demographic Summary    No documentation.       Functional Status    No documentation.       Psychosocial    No documentation.       Abuse/Neglect    No documentation.       Legal    No documentation.       Substance Abuse    No documentation.       Patient Forms    No documentation.           Siria Rosario

## 2019-06-14 NOTE — PLAN OF CARE
Problem: Fall Risk (Adult)  Goal: Absence of Fall  Outcome: Ongoing (interventions implemented as appropriate)      Problem: Pain, Chronic (Adult)  Goal: Acceptable Pain/Comfort Level and Functional Ability  Outcome: Ongoing (interventions implemented as appropriate)      Problem: Patient Care Overview  Goal: Plan of Care Review  Outcome: Ongoing (interventions implemented as appropriate)   06/14/19 0718   Plan of Care Review   Progress improving   OTHER   Outcome Summary Medicated for c/o pain with relief. Up to bathroom with brace x1 assist. Remains confused.   Coping/Psychosocial   Plan of Care Reviewed With patient

## 2019-06-14 NOTE — PLAN OF CARE
Problem: Patient Care Overview  Goal: Plan of Care Review  Outcome: Ongoing (interventions implemented as appropriate)   06/14/19 4336   Coping/Psychosocial   Plan of Care Reviewed With patient   OTHER   Outcome Summary OT eval complete. Pt. c/o pain 8/10. Pt. only orientated to person. Pt. roll L w/minAx2;sidelying-sit min A x 1. Pt. unable to complete LBD due to decreased dynamic sitting balance and pain. Pt. Max A to don socks. Pt. dependent to don/doff LSO 2' decreased cognition. Pt. unaware brace is on at times. Pt. able to ambulate in hallway w/CGA. Decreased cogntion and pain impair pt. ability to perform ADL's safely and increase pt. risk for falls. Skilled OT services recommended to address deficits. D/c inpatient rehab vs. TCU.

## 2019-06-14 NOTE — PLAN OF CARE
Problem: Patient Care Overview  Goal: Plan of Care Review  Outcome: Ongoing (interventions implemented as appropriate)   06/14/19 6744   Plan of Care Review   Progress no change   OTHER   Outcome Summary PT eval completed. Pt oriented to self only. Pt nad  educated on log rolling, spinal precautions and bracing. Pt needed verbal cues and min A x2 for bed mobility and sit to stand t/f. Pt ambulated 50 feet with CGA and RW, and needed cues for proper use of AD. Pt will benefit from skilled PT to improve functional mobility and independence. Recommend d/c to IRF vs SNF vs swing bed pending progress.   Coping/Psychosocial   Plan of Care Reviewed With patient;spouse;son

## 2019-06-14 NOTE — PLAN OF CARE
Problem: Patient Care Overview  Goal: Plan of Care Review   06/14/19 0425   Coping/Psychosocial   Plan of Care Reviewed With patient;family   Coping/Psychosocial   Patient Agreement with Plan of Care agrees   Plan of Care Review   Progress no change   OTHER   Outcome Summary Medicated for pain as needed for c/o of back pain. Turned every 2 hours as needed. To get a brace after MRI completed. Son at bedside.        Problem: Fall Risk (Adult)  Goal: Identify Related Risk Factors and Signs and Symptoms  Outcome: Outcome(s) achieved Date Met: 06/14/19    Goal: Absence of Fall  Outcome: Ongoing (interventions implemented as appropriate)      Problem: Pain, Chronic (Adult)  Goal: Identify Related Risk Factors and Signs and Symptoms  Outcome: Outcome(s) achieved Date Met: 06/14/19    Goal: Acceptable Pain/Comfort Level and Functional Ability  Outcome: Ongoing (interventions implemented as appropriate)

## 2019-06-14 NOTE — DISCHARGE PLACEMENT REQUEST
"Rupa Asencio (83 y.o. Female)     Date of Birth Social Security Number Address Home Phone MRN    [id number]  [address]  [facility] [address] [phone] [id number]    Yarsani Marital Status          Latter-day        Admission Date Admission Type Admitting Provider Attending Provider Department, Room/Bed    6/13/19 Urgent Marco Sandy MD [place], William Lee, MD [facility] 3A, 338/1    Discharge Date Discharge Disposition Discharge Destination                       Attending Provider:  Marco Sandy MD    Allergies:  Adhesive Tape, Atorvastatin, Codeine, Gabapentin, Lortab [Hydrocodone-acetaminophen], Morphine And Related, Morphine Sulfate, Oxycodone-acetaminophen, Percocet [Oxycodone-acetaminophen], Pregabalin    Isolation:  None   Infection:  None   Code Status:  CPR    Ht:  157.5 cm (62\")   Wt:  89.1 kg (196 lb 6.4 oz)    Admission Cmt:  None   Principal Problem:  None                Active Insurance as of 6/13/2019     Primary Coverage     Payor Plan Insurance Group Employer/Plan Group    MEDICARE MEDICARE A & B      Payor Plan Address Payor Plan Phone Number Payor Plan Fax Number Effective Dates    [address] [phone]  6/1/2002 - None Entered    [facility] [id number]       Subscriber Name Subscriber Birth Date Member ID       RUPA ASENCIO 1935 [id number]           Secondary Coverage     Payor Plan Insurance Group Employer/Plan Group    AETNA COMMERCIAL AETNA  [employer] SU[employer]      Payor Plan Address Payor Plan Phone Number Payor Plan Fax Number Effective Dates    [address] [phone]  12/1/2012 - None Entered    [facility] [id number]       Subscriber Name Subscriber Birth Date Member ID       RUPA ASENCIO 1935 [id number]                 Emergency Contacts      (Rel.) Home Phone Work Phone Mobile Phone    Fam Asencio (Spouse) [phone] -- --               History & Physical      Marco Sandy MD at 6/13/2019 11:03 AM      "     NEUROSURGERY INITIAL HOSPITAL ENCOUNTER    Assessment/Plan:   Rupa Hernandez is a 83 y.o. female with a significant comorbidity dementia currently on blood thinners.  She presents with a new problem of falling from standing height. Physical exam findings of thoracolumbar point tenderness but otherwise neurologically intact.  Their imaging shows relatively minor L1 compression fracture.    Differential Diagnosis:   Osteoperosis  L1 compression fracture without neurological compression  Left ankle ecchymosis concerning for sprain versus fracture    Recommendations:  Compression fracture lumbar spine L!.   Risk factors: Prior history of osteoporosis but no steroid use   Fractures appear stable.     MRI of lumbar spine to age fractures.    LSO for comfort and mobilized.     Once fitted will require supine and upright x-rays of the lumbar spine to ensure stability.   Maximal medical management for analgesia.    Narcotics, Tylenol, Gabapentin, valium, Medrol dose pack and lidocaine patches.     Avoid NSAIDs.   PT/OT   We will consider kyphoplasty only after failure of conservative management, 2 weeks   Given patient's dementia and poor mobility she will most likely require skilled nursing facility or rehab placement    Osteoporosis/osteopenia:   Vit D, Ca and DEXA scan   Vitamin D  50,000 international units by mouth per week ×4 weeks followed by,  1000 international units by mouth daily    Calcium  The [place] of [employer] (IOM) has issued recommendations for calcium and vitamin D daily intake in older adults.     Women, > 50 years = 1200 mg/day of calcium.   For both sexes, the recommended upper level was 2000 mg/day. [179]     Calcium Carbonate - Cholecalciferol 600mg/800units tabs.  Take 2 tab PO Q daily.  In addition take Cholcalciferol 5,000 units tabs.  Take 50,000 units once a week for the first 4 weeks.       I discussed the patients findings and my recommendations with patient and family    Level of Risk:  Moderate due to: undiagnosed new problem  MDM: Moderate Complexity  Mod = 16179, High=99223  ___________________________________________________________________    Reason for consult compression fracture    Chief Complaint: Back pain    HPI: Rupa Hernandez is a 83 y.o. female with a significant comorbidity dementia, depression, hyper tension, and prior stroke.  She was in her normal state of health until approximately 4 AM this morning when she fell at home.  The patient lives in her home with her .  She has dementia and so got up in the middle the night.  She stumbled and fell causing a bruise on her left ankle and having immediate onset of back pain.  Her  could not lift her off the floor and therefore called EMS.  She was taken to an outside hospital where a noncontrast CT scan of her thoracic lumbar spine revealed an L1 compression fracture.  Based on the fact that the patient was on anticoagulation and her spinal cord injury she was then transferred to [facility] for further evaluation.    The patient is currently admitted to the neurosurgical service.  She has a complaint of thoracolumbar junction pain.  She denies weakness numbness or tingling in her legs.  She has not stood and walked yet secondary to pain.  She has no bowel or bladder incontinence.  Her pain currently is an 8 out of 10.  Given her dementia she cannot provide any details of the inciting incident..      Review of Systems   Constitutional: Negative.    HENT: Negative.    Eyes: Negative.    Respiratory: Negative.    Cardiovascular: Negative.    Gastrointestinal: Negative.    Endocrine: Negative.    Genitourinary: Negative.    Musculoskeletal: Positive for back pain.   Skin: Negative.    Allergic/Immunologic: Negative.    Neurological: Negative.    Hematological: Negative.    Psychiatric/Behavioral: Negative.         Past Medical History:  has a past medical history of Dementia, Depression, Hypertension, Rectal polyp, and Stroke  (CMS/LTAC, located within [facility]).    Past Surgical History:  has a past surgical history that includes Appendectomy; Hysterectomy; Cataract extraction; Rotator cuff repair (Right); Knee surgery (Bilateral); Dilation and curettage, diagnostic / therapeutic; Oophorectomy; and Colonoscopy (07/14/2014).    Family History: family history includes Colon cancer in her paternal grandmother; No Known Problems in her father and mother.    Social History:  reports that she has never smoked. She has never used smokeless tobacco. She reports that she does not drink alcohol or use drugs.    Allergies: Adhesive tape; Atorvastatin; Codeine; Gabapentin; Lortab [hydrocodone-acetaminophen]; Morphine and related; Morphine sulfate; Oxycodone-acetaminophen; Percocet [oxycodone-acetaminophen]; and Pregabalin    Home Medications: No current facility-administered medications for this encounter.     Medications: Scheduled Meds:  Continuous Infusions:  No current facility-administered medications for this encounter.   PRN Meds:.    Vital Signs  Temp:  [97.8 °F (36.6 °C)] 97.8 °F (36.6 °C)  Heart Rate:  [62] 62  Resp:  [18] 18  BP: (133)/(60) 133/60    Physical Exam  Physical Exam   Constitutional: She is oriented to person, place, and time. She appears well-developed and well-nourished.   HENT:   Head: Normocephalic and atraumatic.   Eyes: EOM are normal. Pupils are equal, round, and reactive to light.   Neck: Normal range of motion. Neck supple.   Pulmonary/Chest: Effort normal and breath sounds normal.   Abdominal: Soft.   Musculoskeletal: Normal range of motion.   Neurological: She is oriented to person, place, and time. She has a normal Finger-Nose-Finger Test, a normal Heel to Serna Test and a normal Tandem Gait Test.   Skin: Skin is warm and dry.   Psychiatric: Her speech is normal.       Neurologic Exam     Mental Status   Oriented to person, place, and time.   Registration: recalls 3 of 3 objects. Recall at 5 minutes: recalls 3 of 3 objects.   Attention:  normal. Concentration: normal.   Speech: speech is normal   Knowledge: consistent with education.     Cranial Nerves     CN II   Visual acuity: normal    CN III, IV, VI   Pupils are equal, round, and reactive to light.  Extraocular motions are normal.   Diplopia: none    CN V   Facial sensation intact.   Right corneal reflex: normal  Left corneal reflex: normal    CN VII   Right facial weakness: none  Left facial weakness: none    CN VIII   Hearing: intact    CN IX, X   Palate: symmetric  Right gag reflex: normal  Left gag reflex: normal    CN XI   Right trapezius strength: normal  Left trapezius strength: normal    CN XII   Tongue deviation: none    Motor Exam   Right arm tone: normal  Left arm tone: normal  Right arm pronator drift: absent  Left arm pronator drift: absent  Right leg tone: normal  Left leg tone: normal    Strength   Strength 5/5 except as noted.     Sensory Exam   Light touch normal.   Proprioception normal.     Gait, Coordination, and Reflexes     Gait  Gait: (Deferred given back pain)    Coordination   Finger to nose coordination: normal  Heel to shin coordination: normal  Tandem walking coordination: normal    Reflexes   Reflexes 2+ except as noted.   Right plantar: normal  Left plantar: normal  Right Calderon: absent  Left Calderon: absent      Results Review:   Independent review and interpretation of imaging  Imaging Results (last 24 hours)     ** No results found for the last 24 hours. **        MRI brain:  MRI spine:   CT Head:  CT c-spine:  CT t-spine: Noncontrast thoracic CT reviewed and sagittal, coronal, and axial views.  There is evidence of an L1 compression fracture.      CT l-spine: Noncontrast lumbar CT redemonstrates L1 compression fracture.  There is minimal retropulsion.  No evidence of posterior element involvement.  Good alignment.  Preservation of lumbar lordosis.  Relatively minor degenerative changes given her age.    X-ray:    I reviewed the patient's new clinical  results.  Lab Results (last 24 hours)     ** No results found for the last 24 hours. **          Marco Sandy MD          Electronically signed by Marco Sandy MD at 6/13/2019  1:38 PM          Physician Progress Notes (most recent note)      Pascual Mendieta, SHIV at 6/14/2019 12:05 PM          Neurosurgery Daily Progress Note    Assessment:   Rupa Hernandez is a 83 y.o. with a significant PMH of significant comorbidity dementia currently on blood thinners.  She presents with a new problem of falling from standing height. Physical exam findings of thoracolumbar point tenderness but otherwise neurologically intact.  Their imaging shows relatively minor L1 compression fracture.      DDX:     Lumbar compression fracture (CMS/HCC)    Patient Active Problem List   Diagnosis   • Essential hypertension   • Mixed hyperlipidemia   • Cerebrovascular accident (CVA) (CMS/HCC)   • PAF (paroxysmal atrial fibrillation) (CMS/HCC)   • Vascular dementia without behavioral disturbance   • Depression   • Dementia   • Cerebrovascular small vessel disease   • Nonrheumatic aortic valve stenosis Mild 2015 Echo   • Hypertensive left ventricular hypertrophy, without heart failure Mild to moderate 2015 Echo   • Lumbar compression fracture (CMS/HCC)     Plan:   Neuro: Stable.  Neuro intact   MRI shows acute compression fracture of the superior endplate of L1.  Appears stable   LSO brace at all times when out of bed   X-ray of the lumbar spine, in brace, upright and supine  CV: IDALIA.  Pulm: IDALIA.  Continuous pulse oximetry  : IDALIA.  Voiding spontaneously  FEN: IDALIA.  Tolerating oral diet  GI: IDALIA.  ID: IDALIA.  Heme:  DVT prophylaxis  Pain: Tolerable.  Waxes and wanes.  Will shorten duration of Demerol  Dispo: PT/OT    Chief complaint:   Back pain post fall    Subjective  Symptoms stable.  Doing well    Temp:  [97.4 °F (36.3 °C)-99 °F (37.2 °C)] 98.3 °F (36.8 °C)  Heart Rate:  [70-78] 78  Resp:  [18-20] 18  BP: (126-156)/(40-91)  "156/91    Objective:  General Appearance:  Comfortable, well-appearing and in no acute distress.    Vital signs: (most recent): Blood pressure 156/91, pulse 78, temperature 98.3 °F (36.8 °C), temperature source Oral, resp. rate 18, height 157.5 cm (62\"), weight 89.1 kg (196 lb 6.4 oz), SpO2 91 %.        Neurologic Exam     Mental Status   Oriented to person, place, and time.   Attention: normal. Concentration: normal.   Speech: speech is normal   Level of consciousness: alert  Follow simple commands     Cranial Nerves     CN II   Visual fields full to confrontation.     CN III, IV, VI   Pupils are equal, round, and reactive to light.  Extraocular motions are normal.     CN V   Facial sensation intact.     CN VII   Facial expression full, symmetric.     CN VIII   CN VIII normal.     CN IX, X   CN IX normal.     CN XI   CN XI normal.     Motor Exam   Muscle bulk: normal  Overall muscle tone: normal  Right arm tone: normal  Left arm tone: normal  Right arm pronator drift: absent  Left arm pronator drift: absent  Right leg tone: normal  Left leg tone: normal    Strength   Right deltoid: 5/5  Left deltoid: 5/5  Right biceps: 5/5  Left biceps: 5/5  Right triceps: 5/5  Left triceps: 5/5  Right wrist extension: 5/5  Left wrist extension: 5/5  Right iliopsoas: 5/5  Left iliopsoas: 5/5  Right quadriceps: 5/5  Left quadriceps: 5/5  Right anterior tibial: 5/5  Left anterior tibial: 5/5  Right posterior tibial: 5/5  Left posterior tibial: 5/5    Sensory Exam   Right arm light touch: normal  Left arm light touch: normal  Right leg light touch: normal  Left leg light touch: normal    Gait, Coordination, and Reflexes     Tremor   Resting tremor: absent  Intention tremor: absent  Action tremor: absent    Reflexes   Right plantar: normal  Left plantar: normal  Right Calderon: absent  Left Calderon: absent  Right ankle clonus: absent  Left ankle clonus: absent  Right pendular knee jerk: absent  Left pendular knee jerk: absent    Drains: "  NA    Imaging Results (last 24 hours)     Procedure Component Value Units Date/Time    MRI Lumbar Spine Without Contrast [[id number]] Collected:  06/13/19 1630     Updated:  06/13/19 1645    Narrative:       HISTORY: L1 compression fracture     MRI lumbar spine: Sagittal and axial images lumbar spine obtained  without contrast.     The alignment of the lumbar spine is appropriate. There is an  acute/subacute compression fracture of the superior endplate of L2  possibly 30%. There is only minimal retropulsion of posterior superior  endplate of L1 by 2 mm. There is no additional compression deformity.  There is mild endplate spurring. No suspicious focal bony mass is  visualized.     The conus medullaris terminates at the L1 level.     There is moderate facet osteoarthropathy with ligamentum flavum  hypertrophy.     At L1, there is only borderline central canal stenosis from the minimal  retropulsion.     At L1/L2, there is no central canal stenosis or neural foramen  narrowing.     At L2/L3, there is moderate facet osteoarthropathy minimal posterior  disc bulging. There is mild central canal stenosis. No neural foramen  narrowing.     At L3/L4, there is moderate facet arthropathy. There is mild posterior  disc bulging with a slight right foraminal predominance. There is mild  central canal stenosis with mild right neural foramen narrowing.     At L4/L5, there is moderate facet arthropathy mild posterior disc  bulging with a mild right foraminal predominance. There is mild central  canal stenosis with mild right neural foramen narrowing at this level.     At L5-S1, moderate facet arthropathy with mild bilateral foraminal disc  bulging. There is only borderline central canal stenosis with mild  bilateral neural foramen narrowing.     Cortical renal atrophy suspected.       Impression:       1. Acute/subacute L1 fracture with loss of height by approximately 30%  and only minimal retropulsion by 2 mm.   2. Mild  degenerative disc change with moderate facet osteoarthropathy.  There is mild lower lumbar spinal canal stenosis and mild neural foramen  narrowing as described above.        This report was finalized on 06/13/2019 16:42 by Dr. Carol Hernandez MD.    XR Ankle 3+ View Left [[id number]] Collected:  06/13/19 1558     Updated:  06/13/19 1605    Narrative:       EXAM: XR ANKLE 3+ VW LEFT- - 6/13/2019 3:49 PM CDT     HISTORY: ecchymosis       COMPARISON: None.      TECHNIQUE:  3 images.  AP, oblique, lateral views     FINDINGS:    Nonstandard lateral view limits evaluation. Small corticated densities  at the tip of the medial malleolus, possibly an old avulsion. No acute  displaced fracture. No aggressive bony lesion. The visual lies joint  space and alignment is anatomic. Talar dome appears intact. No  radiodense foreign body. Degenerative type Achilles enthesophyte. Soft  tissue swelling.          Impression:       1. Soft tissue swelling. No acute bony finding. Partially limited  evaluation due to the nonstandard lateral view. If persistent clinical  concern, consider repeat lateral view.  2. Possible old avulsion of the medial malleolus.  This report was finalized on 06/13/2019 16:02 by Dr Nano Zaragoza MD.        Lab Results (last 24 hours)     Procedure Component Value Units Date/Time    Comprehensive Metabolic Panel [[id number]]  (Abnormal) Collected:  06/13/19 1201    Specimen:  Blood Updated:  06/13/19 1221     Glucose 159 mg/dL      BUN 31 mg/dL      Creatinine 0.88 mg/dL      Sodium 136 mmol/L      Potassium 4.1 mmol/L      Chloride 99 mmol/L      CO2 32.0 mmol/L      Calcium 9.0 mg/dL      Total Protein 7.0 g/dL      Albumin 3.90 g/dL      ALT (SGPT) 24 U/L      AST (SGOT) 29 U/L      Alkaline Phosphatase 69 U/L      Total Bilirubin 0.4 mg/dL      eGFR Non African Amer 62 mL/min/1.73      Globulin 3.1 gm/dL      A/G Ratio 1.3 g/dL      BUN/Creatinine Ratio 35.2     Anion Gap 5.0 mmol/L     Narrative:       GFR  Normal >60  Chronic Kidney Disease <60  Kidney Failure <15    Protime-INR [[id number]]  (Abnormal) Collected:  06/13/19 1201    Specimen:  Blood Updated:  06/13/19 1217     Protime 16.0 Seconds      INR 1.24    aPTT [[id number]]  (Normal) Collected:  06/13/19 1201    Specimen:  Blood Updated:  06/13/19 1217     PTT 32.2 seconds     CBC Auto Differential [[id number]]  (Abnormal) Collected:  06/13/19 1201    Specimen:  Blood Updated:  06/13/19 1209     WBC 10.57 10*3/mm3      RBC 4.38 10*6/mm3      Hemoglobin 12.8 g/dL      Hematocrit 39.0 %      MCV 89.0 fL      MCH 29.2 pg      MCHC 32.8 g/dL      RDW 13.4 %      RDW-SD 43.9 fl      MPV 10.7 fL      Platelets 195 10*3/mm3      Neutrophil % 81.6 %      Lymphocyte % 11.0 %      Monocyte % 6.1 %      Eosinophil % 0.5 %      Basophil % 0.3 %      Immature Grans % 0.5 %      Neutrophils, Absolute 8.63 10*3/mm3      Lymphocytes, Absolute 1.16 10*3/mm3      Monocytes, Absolute 0.65 10*3/mm3      Eosinophils, Absolute 0.05 10*3/mm3      Basophils, Absolute 0.03 10*3/mm3      Immature Grans, Absolute 0.05 10*3/mm3      nRBC 0.0 /100 WBC         [id number]  SHIV Lama        Electronically signed by Pascual Mendieta APRN at 6/14/2019 12:09 PM       Consult Notes (most recent note)     No notes of this type exist for this encounter.        Physical Therapy Notes (most recent note)     No notes of this type exist for this encounter.        Occupational Therapy Notes (most recent note)     No notes of this type exist for this encounter.

## 2019-06-15 VITALS
DIASTOLIC BLOOD PRESSURE: 54 MMHG | TEMPERATURE: 97.9 F | HEIGHT: 62 IN | BODY MASS INDEX: 36.14 KG/M2 | OXYGEN SATURATION: 92 % | WEIGHT: 196.4 LBS | RESPIRATION RATE: 18 BRPM | HEART RATE: 77 BPM | SYSTOLIC BLOOD PRESSURE: 121 MMHG

## 2019-06-15 PROCEDURE — G0378 HOSPITAL OBSERVATION PER HR: HCPCS

## 2019-06-15 PROCEDURE — 99217 PR OBSERVATION CARE DISCHARGE MANAGEMENT: CPT | Performed by: NEUROLOGICAL SURGERY

## 2019-06-15 PROCEDURE — 97116 GAIT TRAINING THERAPY: CPT

## 2019-06-15 PROCEDURE — 97535 SELF CARE MNGMENT TRAINING: CPT

## 2019-06-15 RX ADMIN — FAMOTIDINE 40 MG: 20 TABLET, FILM COATED ORAL at 08:18

## 2019-06-15 RX ADMIN — MEPERIDINE HYDROCHLORIDE 50 MG: 50 TABLET ORAL at 03:54

## 2019-06-15 RX ADMIN — LISINOPRIL: 10 TABLET ORAL at 08:17

## 2019-06-15 RX ADMIN — PAROXETINE HYDROCHLORIDE 20 MG: 20 TABLET, FILM COATED ORAL at 08:18

## 2019-06-15 RX ADMIN — SODIUM CHLORIDE, PRESERVATIVE FREE 3 ML: 5 INJECTION INTRAVENOUS at 08:19

## 2019-06-15 RX ADMIN — MEPERIDINE HYDROCHLORIDE 50 MG: 50 TABLET ORAL at 13:13

## 2019-06-15 RX ADMIN — AMLODIPINE BESYLATE 5 MG: 5 TABLET ORAL at 08:18

## 2019-06-15 RX ADMIN — DOCUSATE SODIUM 250 MG: 50 LIQUID ORAL at 13:12

## 2019-06-15 RX ADMIN — MEPERIDINE HYDROCHLORIDE 50 MG: 50 TABLET ORAL at 08:18

## 2019-06-15 RX ADMIN — MEMANTINE HYDROCHLORIDE 10 MG: 5 TABLET, FILM COATED ORAL at 08:18

## 2019-06-15 NOTE — PLAN OF CARE
Problem: Patient Care Overview  Goal: Plan of Care Review  Outcome: Ongoing (interventions implemented as appropriate)   06/15/19 0801   Coping/Psychosocial   Plan of Care Reviewed With patient;family   Coping/Psychosocial   Patient Agreement with Plan of Care agrees   Plan of Care Review   Progress improving   OTHER   Outcome Summary Pt in bed; confused this session and frequently repeats self. Pt log roll Anabell; sidelying to sit modA. Pt reported 9/10 pain; NSG in room giving pain meds time of tx. Pt Sit<>stand multiple times this date Anabell with RW. Fxl mobility in room<>bathroom CGA via RW; toileted with modA limited by decreased understanding of task and need of cues/assist to complete hygiene. Stood at sink washed hands with VCs for sequencing and set up; face washing set up. Pt needs OT for continued UE strength, endurance and increased ADL I. Unable to state any spinal precautions this date and maxA for LSO donning. Recommend inpatient vs SNF pending pleasant confusion.

## 2019-06-15 NOTE — PLAN OF CARE
Problem: Patient Care Overview  Goal: Plan of Care Review  Outcome: Ongoing (interventions implemented as appropriate)   06/15/19 9647   Plan of Care Review   Progress no change   OTHER   Outcome Summary medicated for c/o pain with relief, incontinent, brief on, confused, repositioned Q2H, will cont to monitor.    Coping/Psychosocial   Plan of Care Reviewed With patient

## 2019-06-15 NOTE — THERAPY TREATMENT NOTE
Acute Care - Physical Therapy Treatment Note  [facility]     Patient Name: Rupa Hernandez  : 1935  MRN: [id number]  Today's Date: 6/15/2019  Onset of Illness/Injury or Date of Surgery: 19  Date of Referral to PT: 19  Referring Physician: SHIV Mendieta    Admit Date: 2019    Visit Dx:    ICD-10-CM ICD-9-CM   1. Impaired mobility Z74.09 799.89   2. Impaired mobility and ADLs Z74.09 799.89     Patient Active Problem List   Diagnosis   • Essential hypertension   • Mixed hyperlipidemia   • Cerebrovascular accident (CVA) (CMS/HCC)   • PAF (paroxysmal atrial fibrillation) (CMS/HCC)   • Vascular dementia without behavioral disturbance   • Depression   • Dementia   • Cerebrovascular small vessel disease   • Nonrheumatic aortic valve stenosis Mild  Echo   • Hypertensive left ventricular hypertrophy, without heart failure Mild to moderate  Echo   • Lumbar compression fracture (CMS/HCC)       Therapy Treatment    Rehabilitation Treatment Summary     Row Name 06/15/19 1021 06/15/19 0801          Treatment Time/Intention    Discipline  physical therapy assistant  -NW  occupational therapy assistant  -MM     Document Type  therapy note (daily note)  -NW  therapy note (daily note)  -MM     Subjective Information  complains of;pain  -NW2  complains of;pain  -MM     Mode of Treatment  --  individual therapy;occupational therapy  -MM     Patient/Family Observations  family  present  -NW2  son   -MM     Patient Effort  good  -NW2  good  -MM     Existing Precautions/Restrictions  fall;brace worn when out of bed;spinal  -NW2  fall;brace worn when out of bed;spinal  -MM     Treatment Considerations/Comments  LSO  -NW2  LSO  -MM     Recorded by [NW] Linnea Mason, PTA 06/15/19 1050  [NW2] Linnea Mason, PTA 06/15/19 1100 [MM] Dariana Carey COTA/L 06/15/19 0921     Row Name 06/15/19 1021             Safety Issues, Functional Mobility    Safety Issues Affecting Function (Mobility)  safety precaution  awareness  -NW      Impairments Affecting Function (Mobility)  cognition;pain  -NW      Recorded by [NW] Linnea Mason, PTA 06/15/19 1100      Row Name 06/15/19 1021             Bed Mobility Assessment/Treatment    Sit-Sidelying [place] (Bed Mobility)  -- has posterior lean upon standing   -NW      Comment (Bed Mobility)  up in chair  -NW      Recorded by [NW] Linnea Mason, PTA 06/15/19 1100      Row Name 06/15/19 1021             Transfer Assessment/Treatment    Transfer Assessment/Treatment  toilet transfer  -NW      Recorded by [NW] Linnea Mason, PTA 06/15/19 1100      Row Name 06/15/19 1021             Sit-Stand Transfer    Sit-Stand [place] (Transfers)  minimum assist (75% patient effort);verbal cues  -NW      Assistive Device (Sit-Stand Transfers)  walker, front-wheeled  -NW      Recorded by [NW] Linnea Mason, PTA 06/15/19 1100      Row Name 06/15/19 1021             Stand-Sit Transfer    Stand-Sit [place] (Transfers)  contact guard;verbal cues  -NW      Recorded by [NW] Linnea Mason, PTA 06/15/19 1100      Row Name 06/15/19 1021             Toilet Transfer    [place] Level (Toilet Transfer)  verbal cues;contact guard;minimum assist (75% patient effort)  -NW      Assistive Device (Toilet Transfer)  -- mult sit-stands to clean up   -NW      Recorded by [NW] Linnea Mason, PTA 06/15/19 1100      Row Name 06/15/19 1021             Gait/Stairs Assessment/Training    [place] Level (Gait)  contact guard;verbal cues  -NW      Assistive Device (Gait)  walker, front-wheeled  -NW      Distance in Feet (Gait)  50 50x3  -NW      Pattern (Gait)  step-through  -NW      Deviations/Abnormal Patterns (Gait)  huan decreased;stride length decreased  -NW      Comment (Gait/Stairs)  mult standing rests due to pain. cues for safety and help w/ AD  -NW      Recorded by [NW] Linnea Mason, PTA 06/15/19 1100      Row Name 06/15/19 1021             Positioning and Restraints     Pre-Treatment Position  sitting in chair/recliner  -NW      Post Treatment Position  chair  -NW      In Chair  reclined;call light within reach;encouraged to call for assist;with family/caregiver  -NW      Recorded by [NW] MarlonJenniferLinnea TERESITA, [facility] 06/15/19 1100      Row Name 06/15/19 1021             Pain Scale: Numbers Pre/Post-Treatment    Pain Scale: Numbers, Post-Treatment  7/10  -NW      Pain Location  back  -NW      Recorded by [NW] Linnea Mason, [facility] 06/15/19 1100      Row Name 06/15/19 1021             Pain Scale: FACES Pre/Post-Treatment    Pain: FACES Scale, Pretreatment  0-->no hurt at rest  -NW      Pain: FACES Scale, Post-Treatment  8-->hurts whole lot with mobility  -NW      Recorded by [NW] Linnea Mason, [facility] 06/15/19 1100        User Key  (r) = Recorded By, (t) = Taken By, (c) = Cosigned By    Initials Name Effective Dates Discipline    NW Linnea Mason, [facility] 08/02/16 -  PT    MM Dariana Carey COTA/L 10/15/18 -  OT                   Physical Therapy Education     Title: PT OT SLP Therapies (Done)     Topic: Physical Therapy (Done)     Point: Mobility training (Done)     Learning Progress Summary           Patient Acceptance, E, VU,NR by SB at 6/14/2019  1:13 PM    Comment:  pt edu on POC, benefits of act, spinal precautions and bracing   Family Acceptance, E, VU,NR by SB at 6/14/2019  1:13 PM    Comment:  pt edu on POC, benefits of act, spinal precautions and bracing                   Point: Home exercise program (Done)     Learning Progress Summary           Patient Acceptance, E, VU,NR by SB at 6/14/2019  1:13 PM    Comment:  pt edu on POC, benefits of act, spinal precautions and bracing   Family Acceptance, E, VU,NR by SB at 6/14/2019  1:13 PM    Comment:  pt edu on POC, benefits of act, spinal precautions and bracing                   Point: Body mechanics (Done)     Learning Progress Summary           Patient Acceptance, E, VU,NR by SB at 6/14/2019  1:13 PM    Comment:  pt edu on POC,  benefits of act, spinal precautions and bracing   Family Acceptance, E, VU,NR by SB at 6/14/2019  1:13 PM    Comment:  pt edu on POC, benefits of act, spinal precautions and bracing                   Point: Precautions (Done)     Learning Progress Summary           Patient Acceptance, E, VU,NR by SB at 6/14/2019  1:13 PM    Comment:  pt edu on POC, benefits of act, spinal precautions and bracing   Family Acceptance, E, VU,NR by SB at 6/14/2019  1:13 PM    Comment:  pt edu on POC, benefits of act, spinal precautions and bracing                               User Key     Initials Effective Dates Name Provider Type Discipline    SB 08/31/18 -  Ingrid Rice, PT Physical Therapist PT                PT Recommendation and Plan        Outcome Measures     Row Name 06/15/19 1100 06/14/19 1325 06/14/19 1300       How much help from another person do you currently need...    Turning from your back to your side while in flat bed without using bedrails?  3  -NW  --  3  -SB    Moving from lying on back to sitting on the side of a flat bed without bedrails?  3  -NW  --  3  -SB    Moving to and from a bed to a chair (including a wheelchair)?  3  -NW  --  3  -SB    Standing up from a chair using your arms (e.g., wheelchair, bedside chair)?  3  -NW  --  3  -SB    Climbing 3-5 steps with a railing?  3  -NW  --  2  -SB    To walk in hospital room?  3  -NW  --  3  -SB    AM-PAC 6 Clicks Score  18  -NW  --  17  -SB       How much help from another is currently needed...    Putting on and taking off regular lower body clothing?  --  1  -MM (r) LH (t) MM (c)  --    Bathing (including washing, rinsing, and drying)  --  2  -MM (r) LH (t) MM (c)  --    Toileting (which includes using toilet bed pan or urinal)  --  2  -MM (r) LH (t) MM (c)  --    Putting on and taking off regular upper body clothing  --  3  -MM (r) LH (t) MM (c)  --    Taking care of personal grooming (such as brushing teeth)  --  3  -MM (r) LH (t) MM (c)  --    Eating meals   --  4  -MM (r) LH (t) MM (c)  --    Score  --  15  -MM (r) LH (t)  --       Functional Assessment    Outcome Measure Options  AM-PAC 6 Clicks Basic Mobility (PT)  -NW  AM-PAC 6 Clicks Daily Activity (OT)  -MM (r) LH (t) MM (c)  AM-PAC 6 Clicks Basic Mobility (PT)  -SB      User Key  (r) = Recorded By, (t) = Taken By, (c) = Cosigned By    Initials Name Provider Type    Linnea Orlando PTA Physical Therapy Assistant    MM Eliezer Cates, OTR/L Occupational Therapist    SB Ingrid Rice, PT Physical Therapist    LH Pedrito Barrow, OT Student OT Student         Time Calculation:   PT Charges     Row Name 06/15/19 1100             Time Calculation    Start Time  1021  -NW      Stop Time  1045  -NW      Time Calculation (min)  24 min  -NW      PT Received On  06/15/19  -NW      PT Goal Re-Cert Due Date  06/24/19  -NW         Time Calculation- PT    Total Timed Code Minutes- PT  24 minute(s)  -NW         Timed Charges    16533 - Gait Training Minutes   24  -NW        User Key  (r) = Recorded By, (t) = Taken By, (c) = Cosigned By    Initials Name Provider Type    Linnea Orlando PTA Physical Therapy Assistant        Therapy Charges for Today     Code Description Service Date Service Provider Modifiers Qty    80029027768 HC GAIT TRAINING EA 15 MIN 6/15/2019 Linnea Mason PTA GP 2          PT G-Codes  Outcome Measure Options: AM-PAC 6 Clicks Basic Mobility (PT)  AM-PAC 6 Clicks Score: 18  Score: 15    Linnea Mason PTA  6/15/2019

## 2019-06-15 NOTE — THERAPY DISCHARGE NOTE
Acute Care - Physical Therapy Discharge Summary  [facility]       Patient Name: Rupa Hernandez  : 1935  MRN: [id number]    Today's Date: 6/15/2019  Onset of Illness/Injury or Date of Surgery: 19    Date of Referral to PT: 19  Referring Physician: SHIV Mendieta      Admit Date: 2019      PT Recommendation and Plan    Visit Dx:    ICD-10-CM ICD-9-CM   1. Impaired mobility Z74.09 799.89   2. Impaired mobility and ADLs Z74.09 799.89       Outcome Measures     Row Name 06/15/19 1100 06/15/19 0801 19 1325       How much help from another person do you currently need...    Turning from your back to your side while in flat bed without using bedrails?  3  -NW  --  --    Moving from lying on back to sitting on the side of a flat bed without bedrails?  3  -NW  --  --    Moving to and from a bed to a chair (including a wheelchair)?  3  -NW  --  --    Standing up from a chair using your arms (e.g., wheelchair, bedside chair)?  3  -NW  --  --    Climbing 3-5 steps with a railing?  3  -NW  --  --    To walk in hospital room?  3  -NW  --  --    AM-PAC 6 Clicks Score  18  -NW  --  --       How much help from another is currently needed...    Putting on and taking off regular lower body clothing?  --  2  -MM  1  -MMA (r) LH (t) MMA (c)    Bathing (including washing, rinsing, and drying)  --  2  -MM  2  -MMA (r) LH (t) MMA (c)    Toileting (which includes using toilet bed pan or urinal)  --  2  -MM  2  -MMA (r) LH (t) MMA (c)    Putting on and taking off regular upper body clothing  --  3  -MM  3  -MMA (r) LH (t) MMA (c)    Taking care of personal grooming (such as brushing teeth)  --  3  -MM  3  -MMA (r) LH (t) MMA (c)    Eating meals  --  4  -MM  4  -MMA (r) LH (t) MMA (c)    Score  --  16  -MM  15  -MMA (r) LH (t)       Functional Assessment    Outcome Measure Options  AM-PAC 6 Clicks Basic Mobility (PT)  -NW  --  AM-PAC 6 Clicks Daily Activity (OT)  -MMA (r) LH (t) MMA (c)    Row Name 19 1300              How much help from another person do you currently need...    Turning from your back to your side while in flat bed without using bedrails?  3  -SB      Moving from lying on back to sitting on the side of a flat bed without bedrails?  3  -SB      Moving to and from a bed to a chair (including a wheelchair)?  3  -SB      Standing up from a chair using your arms (e.g., wheelchair, bedside chair)?  3  -SB      Climbing 3-5 steps with a railing?  2  -SB      To walk in hospital room?  3  -SB      AM-PAC 6 Clicks Score  17  -SB         Functional Assessment    Outcome Measure Options  AM-PAC 6 Clicks Basic Mobility (PT)  -SB        User Key  (r) = Recorded By, (t) = Taken By, (c) = Cosigned By    Initials Name Provider Type    Linnea Orlando PTA Physical Therapy Assistant    Eliezer Marquez, OTR/L Occupational Therapist    MM Dariana Carey DEE/L Occupational Therapy Assistant    SB Ingrid Rice, PT Physical Therapist    Pedrito Diop, OT Student OT Student          PT Charges     Row Name 06/15/19 1100             Time Calculation    Start Time  1021  -NW      Stop Time  1045  -NW      Time Calculation (min)  24 min  -NW      PT Received On  06/15/19  -NW      PT Goal Re-Cert Due Date  06/24/19  -NW         Time Calculation- PT    Total Timed Code Minutes- PT  24 minute(s)  -NW         Timed Charges    29446 - Gait Training Minutes   24  -NW        User Key  (r) = Recorded By, (t) = Taken By, (c) = Cosigned By    Initials Name Provider Type    Linnea Orlando PTA Physical Therapy Assistant          Rehab Goal Summary     Row Name 06/15/19 1543             Physical Therapy Goals    Bed Mobility Goal Selection (PT)  bed mobility, PT goal 1  -CW      Transfer Goal Selection (PT)  transfer, PT goal 1  -CW      Gait Training Goal Selection (PT)  gait training, PT goal 1  -CW         Bed Mobility Goal 1 (PT)    Activity/Assistive Device (Bed Mobility Goal 1, PT)  sidelying to sit;sit to  sidelying;rolling to right;rolling to left  -CW      [place] Level/Cues Needed (Bed Mobility Goal 1, PT)  contact guard assist  -CW      Time Frame (Bed Mobility Goal 1, PT)  by discharge  -CW      Progress/Outcomes (Bed Mobility Goal 1, PT)  goal not met  -CW         Transfer Goal 1 (PT)    Activity/Assistive Device (Transfer Goal 1, PT)  sit-to-stand/stand-to-sit;bed-to-chair/chair-to-bed;walker, rolling  -CW      [place] Level/Cues Needed (Transfer Goal 1, PT)  contact guard assist  -CW      Time Frame (Transfer Goal 1, PT)  by discharge  -CW      Progress/Outcome (Transfer Goal 1, PT)  goal not met  -CW         Gait Training Goal 1 (PT)    Activity/Assistive Device (Gait Training Goal 1, PT)  gait (walking locomotion);decrease fall risk;increase endurance/gait distance;improve balance and speed;decrease asymmetrical patterns;assistive device use  -CW      [place] Level (Gait Training Goal 1, PT)  contact guard assist  -CW      Distance (Gait Goal 1, PT)  75  -CW      Time Frame (Gait Training Goal 1, PT)  by discharge  -CW      Progress/Outcome (Gait Training Goal 1, PT)  goal not met  -CW        User Key  (r) = Recorded By, (t) = Taken By, (c) = Cosigned By    Initials Name Provider Type Discipline    Tonia Mcbride PTA Physical Therapy Assistant PT              PT Discharge Summary  Reason for Discharge: Discharge from facility  Outcomes Achieved: Refer to plan of care for updates on goals achieved  Discharge Destination: Home      Tonia Haque PTA   6/15/2019

## 2019-06-15 NOTE — THERAPY TREATMENT NOTE
Acute Care - Occupational Therapy Treatment Note  [facility]     Patient Name: Rupa Hernandez  : 1935  MRN: [id number]  Today's Date: 6/15/2019  Onset of Illness/Injury or Date of Surgery: 19  Date of Referral to OT: 19  Referring Physician: SHIV Mendieta    Admit Date: 2019       ICD-10-CM ICD-9-CM   1. Impaired mobility Z74. 799.89   2. Impaired mobility and ADLs Z74.09 799.89     Patient Active Problem List   Diagnosis   • Essential hypertension   • Mixed hyperlipidemia   • Cerebrovascular accident (CVA) (CMS/HCC)   • PAF (paroxysmal atrial fibrillation) (CMS/HCC)   • Vascular dementia without behavioral disturbance   • Depression   • Dementia   • Cerebrovascular small vessel disease   • Nonrheumatic aortic valve stenosis Mild  Echo   • Hypertensive left ventricular hypertrophy, without heart failure Mild to moderate  Echo   • Lumbar compression fracture (CMS/HCC)     Past Medical History:   Diagnosis Date   • Dementia    • Depression    • Hypertension    • Rectal polyp    • Stroke (CMS/HCC)      Past Surgical History:   Procedure Laterality Date   • APPENDECTOMY     • CATARACT EXTRACTION     • COLONOSCOPY  2014    diverticulosis   • DILATION AND CURETTAGE, DIAGNOSTIC / THERAPEUTIC     • HYSTERECTOMY     • KNEE SURGERY Bilateral     X2   • OOPHORECTOMY     • ROTATOR CUFF REPAIR Right        Therapy Treatment    Rehabilitation Treatment Summary     Row Name 06/15/19 1021 06/15/19 0801          Treatment Time/Intention    Discipline  physical therapy assistant  -NW  occupational therapy assistant  -MM     Document Type  therapy note (daily note)  -NW  therapy note (daily note)  -MM     Subjective Information  complains of;pain  -NW2  complains of;pain  -MM     Mode of Treatment  --  individual therapy;occupational therapy  -MM     Patient/Family Observations  family  present  -NW2  son   -MM     Patient Effort  good  -NW2  good  -MM     Existing Precautions/Restrictions   fall;brace worn when out of bed;spinal  -NW2  fall;brace worn when out of bed;spinal  -MM     Treatment Considerations/Comments  LSO  -NW2  LSO  -MM     Recorded by [NW] Linnea Mason, PTA 06/15/19 1050  [NW2] Linnea Mason, PTA 06/15/19 1100 [MM] Dariana Carey COTA/L 06/15/19 0921     Row Name 06/15/19 0801             Cognitive Assessment/Intervention- PT/OT    Affect/Mental Status (Cognitive)  confused  -MM      Orientation Status (Cognition)  oriented to;person  -MM      Memory Deficit (Cognitive)  severe deficit;immediate recall;recall, recent events  -MM      Personal Safety Interventions  elopement precautions initiated;fall prevention program maintained;gait belt;nonskid shoes/slippers when out of bed;supervised activity  -MM      Recorded by [MM] Dariana Carey COTA/L 06/15/19 1114      Row Name 06/15/19 1021 06/15/19 0801          Safety Issues, Functional Mobility    Safety Issues Affecting Function (Mobility)  safety precaution awareness  -NW  awareness of need for assistance;insight into deficits/self awareness;safety precaution awareness  -MM     Impairments Affecting Function (Mobility)  cognition;pain  -NW  cognition;pain  -MM     Recorded by [NW] Linnea Mason, PTA 06/15/19 1100 [MM] Dariana Carey COTA/L 06/15/19 1114     Row Name 06/15/19 1021 06/15/19 0801          Bed Mobility Assessment/Treatment    Rolling Left [place] (Bed Mobility)  --  minimum assist (75% patient effort);verbal cues  -MM     Sidelying-Sit [place] (Bed Mobility)  --  verbal cues;moderate assist (50% patient effort)  -MM     Sit-Sidelying [place] (Bed Mobility)  -- has posterior lean upon standing   -NW  --     Bed Mobility, Safety Issues  --  cognitive deficits limit understanding  -MM     Comment (Bed Mobility)  up in chair  -NW  log roll tech; ModA this date pt had difficulty understanding steps for log roll needed TCs/Demo  -MM     Recorded by [NW] Linnea Mason, PTA 06/15/19 1100  [MM] Dariana Carey COTA/L 06/15/19 1114     Row Name 06/15/19 0801             Functional Mobility    Functional Mobility- Ind. Level  verbal cues required;contact guard assist  -MM      Functional Mobility- Device  rolling walker  -MM      Functional Mobility- Comment  in room>bathroom>chair  -MM      Recorded by [MM] Dariana Carey COTA/L 06/15/19 1114      Row Name 06/15/19 1021 06/15/19 0801          Transfer Assessment/Treatment    Transfer Assessment/Treatment  toilet transfer  -NW  sit-stand transfer;stand-sit transfer;toilet transfer  -MM     Comment (Transfers)  --  sit<>stand multiple times from commode Anabell during hygiene  -MM     Recorded by [NW] Linnea Mason, PTA 06/15/19 1100 [MM] Dariana Carey COTA/L 06/15/19 1114     Row Name 06/15/19 1021 06/15/19 0801          Sit-Stand Transfer    Sit-Stand [place] (Transfers)  minimum assist (75% patient effort);verbal cues  -NW  verbal cues;minimum assist (75% patient effort)  -MM     Assistive Device (Sit-Stand Transfers)  walker, front-wheeled  -NW  walker, front-wheeled  -MM     Recorded by [NW] Linnea Mason, PTA 06/15/19 1100 [MM] Dariana Carey COTA/L 06/15/19 1114     Row Name 06/15/19 1021 06/15/19 0801          Stand-Sit Transfer    Stand-Sit [place] (Transfers)  contact guard;verbal cues  -NW  verbal cues;contact guard  -MM     Assistive Device (Stand-Sit Transfers)  --  walker, front-wheeled  -MM     Recorded by [NW] Linnea Mason, PTA 06/15/19 1100 [MM] Dariana Carey COTA/L 06/15/19 1114     Row Name 06/15/19 1021 06/15/19 0801          Toilet Transfer    Type (Toilet Transfer)  --  sit-stand;stand-sit  -MM     [place] Level (Toilet Transfer)  verbal cues;contact guard;minimum assist (75% patient effort)  -NW  minimum assist (75% patient effort);contact guard  -MM     Assistive Device (Toilet Transfer)  -- mult sit-stands to clean up   -NW  commode;grab bars/safety frame;walker, front-wheeled  -MM      Recorded by [NW] Linnea Mason, PTA 06/15/19 1100 [MM] Dariana Carey COTA/L 06/15/19 1114     Row Name 06/15/19 1021             Gait/Stairs Assessment/Training    [place] Level (Gait)  contact guard;verbal cues  -NW      Assistive Device (Gait)  walker, front-wheeled  -NW      Distance in Feet (Gait)  50 50x3  -NW      Pattern (Gait)  step-through  -NW      Deviations/Abnormal Patterns (Gait)  huan decreased;stride length decreased  -NW      Comment (Gait/Stairs)  mult standing rests due to pain. cues for safety and help w/ AD  -NW      Recorded by [NW] Linnea Mason, PTA 06/15/19 1100      Row Name 06/15/19 0801             ADL Assessment/Intervention    BADL Assessment/Intervention  upper body dressing;grooming;toileting  -MM      Recorded by [MM] Dariana Carey COTA/L 06/15/19 1114      Row Name 06/15/19 0801             Upper Body Dressing Assessment/Training    Upper Body Dressing [place] Level  don;set up;verbal cues;maximum assist (25% patient effort) LSO  -MM      Upper Body Dressing Position  edge of bed sitting  -MM      Comment (Upper Body Dressing)  LSO; limited by cognition/understanding of task; did pull/attempt to fasten this date  -MM      Recorded by [MM] Dariana Carey COTA/L 06/15/19 1114      Row Name 06/15/19 0801             Grooming Assessment/Training    [place] Level (Grooming)  wash face, hands;set up;contact guard assist;verbal cues  -MM      Grooming Position  supported standing  -MM      Comment (Grooming)  at sink standing for 5 mins  -MM      Recorded by [MM] Dariana Carey COTA/L 06/15/19 1114      Row Name 06/15/19 0801             Toileting Assessment/Training    [place]ell Level (Toileting)  toileting skills;adjust/manage clothing;perform perineal hygiene;verbal cues;moderate assist (50% patient effort)  -MM      Toileting Position  unsupported sitting;supported standing  -MM      Comment (Toileting)  pt confused; needed assist d/t  limited ability to get self clean and continuing to sit<>stand and not performing hygiene without assist.   -MM      Recorded by [MM] Dariana Carey COTA/L 06/15/19 1114      Row Name 06/15/19 0801             BADL Safety/Performance    Impairments, BADL Safety/Performance  cognition;pain;strength  -MM      Cognitive Impairments, BADL Safety/Performance  awareness, need for assistance;attention;insight into deficits/self awareness;problem solving/reasoning;safety precaution awareness;sequencing abilities  -MM      Skilled BADL Treatment/Intervention  BADL process/adaptation training;cognitive/safety deficit modifications;compensatory training  -MM      Recorded by [MM] Dariana Carey COTA/L 06/15/19 1114      Row Name 06/15/19 1021 06/15/19 0801          Positioning and Restraints    Pre-Treatment Position  sitting in chair/recliner  -NW  in bed  -MM     Post Treatment Position  chair  -NW  chair  -MM     In Chair  reclined;call light within reach;encouraged to call for assist;with family/caregiver  -NW  sitting;call light within reach;encouraged to call for assist;exit alarm on;with family/caregiver  -MM     Recorded by [NW] Linnea Mason, PTA 06/15/19 1100 [MM] Dariana Carey COTA/L 06/15/19 1114     Row Name 06/15/19 1021 06/15/19 0801          Pain Scale: Numbers Pre/Post-Treatment    Pain Scale: Numbers, Pretreatment  --  9/10  -MM     Pain Scale: Numbers, Post-Treatment  7/10  -NW  9/10  -MM     Pain Location  back  -NW  back  -MM     Recorded by [NW] Linnea Mason, PTA 06/15/19 1100 [MM] Dariana Carey COTA/L 06/15/19 1114     Row Name 06/15/19 1021             Pain Scale: FACES Pre/Post-Treatment    Pain: FACES Scale, Pretreatment  0-->no hurt at rest  -NW      Pain: FACES Scale, Post-Treatment  8-->hurts whole lot with mobility  -NW      Recorded by [NW] Linnea Mason, PTA 06/15/19 1100      Row Name 06/15/19 0801             Spinal Orthosis Management    Orthosis Training (Spinal  Orthosis)  patient;activity limitations/precautions;donning/doffing orthosis;requires assistance;unable to meet, needs instruction  (Significant)   -MM      Recorded by [MM] Dariana Carey COTA/L 06/15/19 1114      Row Name 06/15/19 0801             Plan of Care Review    Plan of Care Reviewed With  patient;son  -MM      Recorded by [MM] Dariana Craey DEE/L 06/15/19 1114      Row Name 06/15/19 0801             Outcome Summary/Treatment Plan (OT)    Daily Summary of Progress (OT)  progress towards functional goals is fair  -MM      Recorded by [MM] Dariana Carey DEE/L 06/15/19 1114        User Key  (r) = Recorded By, (t) = Taken By, (c) = Cosigned By    Initials Name Effective Dates Discipline    NW Linnea Mason, PTA 08/02/16 -  PT    MM Dariana Carey DEE/L 10/15/18 -  OT             Occupational Therapy Education     Title: PT OT SLP Therapies (In Progress)     Topic: Occupational Therapy (In Progress)     Point: ADL training (In Progress)     Description: Instruct learner(s) on proper safety adaptation and remediation techniques during self care or transfers.   Instruct in proper use of assistive devices.    Learning Progress Summary           Patient Acceptance, E, NR by MM at 6/15/2019 11:14 AM    Comment:  LSO don/doff; spinal precautions pt knew 0/3; need for OOB activity and log roll tech    Acceptance, E, VU by  at 6/14/2019  1:26 PM    Comment:  Pt. education on bed mobility, LSO orthosis wearing schedule and don/doff procedure, use of adaptive equipment.   Family Acceptance, E, NR by MM at 6/15/2019 11:14 AM    Comment:  LSO don/doff; spinal precautions pt knew 0/3; need for OOB activity and log roll tech                   Point: Precautions (In Progress)     Description: Instruct learner(s) on prescribed precautions during self-care and functional transfers.    Learning Progress Summary           Patient Acceptance, E, NR by MM at 6/15/2019 11:14 AM    Comment:  LSO don/doff;  spinal precautions pt knew 0/3; need for OOB activity and log roll tech    Acceptance, E, VU by  at 6/14/2019  1:26 PM    Comment:  Pt. education on bed mobility, LSO orthosis wearing schedule and don/doff procedure, use of adaptive equipment.   Family Acceptance, E, NR by  at 6/15/2019 11:14 AM    Comment:  LSO don/doff; spinal precautions pt knew 0/3; need for OOB activity and log roll tech                   Point: Body mechanics (Done)     Description: Instruct learner(s) on proper positioning and spine alignment during self-care, functional mobility activities and/or exercises.    Learning Progress Summary           Patient Acceptance, E, VU by  at 6/14/2019  1:26 PM    Comment:  Pt. education on bed mobility, LSO orthosis wearing schedule and don/doff procedure, use of adaptive equipment.                               User Key     Initials Effective Dates Name Provider Type Discipline     10/15/18 -  Dariana Carey COTA/L Occupational Therapy Assistant OT     04/15/19 -  Pedrito Barrow OT Student OT Student OT                OT Recommendation and Plan  Outcome Summary/Treatment Plan (OT)  Daily Summary of Progress (OT): progress towards functional goals is fair  Daily Summary of Progress (OT): progress towards functional goals is fair  Plan of Care Review  Plan of Care Reviewed With: patient, son  Plan of Care Reviewed With: patient, son  Outcome Summary: Pt in bed; confused this session and frequently repeats self. Pt log roll [place]ell; sidelying to sit modA. Pt reported 9/10 pain; NSG in room giving pain meds time of tx. Pt Sit<>stand multiple times this date Anabell with RW. Fxl mobility in room<>bathroom CGA via RW; toileted with modA limited by decreased understanding of task and need of cues/assist to complete hygiene. Stood at sink washed hands with VCs for sequencing and set up; face washing set up. Pt needs OT for continued UE strength, endurance and increased ADL I. Unable to state any  spinal precautions this date and maxA for LSO donning. Recommend inpatient vs SNF pending pleasant confusion.   Outcome Measures     Row Name 06/15/19 1100 06/15/19 0801 06/14/19 1325       How much help from another person do you currently need...    Turning from your back to your side while in flat bed without using bedrails?  3  -NW  --  --    Moving from lying on back to sitting on the side of a flat bed without bedrails?  3  -NW  --  --    Moving to and from a bed to a chair (including a wheelchair)?  3  -NW  --  --    Standing up from a chair using your arms (e.g., wheelchair, bedside chair)?  3  -NW  --  --    Climbing 3-5 steps with a railing?  3  -NW  --  --    To walk in hospital room?  3  -NW  --  --    AM-PAC 6 Clicks Score  18  -NW  --  --       How much help from another is currently needed...    Putting on and taking off regular lower body clothing?  --  2  -MM  1  -MMA (r) LH (t) MMA (c)    Bathing (including washing, rinsing, and drying)  --  2  -MM  2  -MMA (r) LH (t) MMA (c)    Toileting (which includes using toilet bed pan or urinal)  --  2  -MM  2  -MMA (r) LH (t) MMA (c)    Putting on and taking off regular upper body clothing  --  3  -MM  3  -MMA (r) LH (t) MMA (c)    Taking care of personal grooming (such as brushing teeth)  --  3  -MM  3  -MMA (r) LH (t) MMA (c)    Eating meals  --  4  -MM  4  -MMA (r) LH (t) MMA (c)    Score  --  16  -MM  15  -MMA (r) LH (t)       Functional Assessment    Outcome Measure Options  AM-PAC 6 Clicks Basic Mobility (PT)  -NW  --  AM-PAC 6 Clicks Daily Activity (OT)  -MMA (r) LH (t) MMA (c)    Row Name 06/14/19 1300             How much help from another person do you currently need...    Turning from your back to your side while in flat bed without using bedrails?  3  -SB      Moving from lying on back to sitting on the side of a flat bed without bedrails?  3  -SB      Moving to and from a bed to a chair (including a wheelchair)?  3  -SB      Standing up from a  chair using your arms (e.g., wheelchair, bedside chair)?  3  -SB      Climbing 3-5 steps with a railing?  2  -SB      To walk in hospital room?  3  -SB      AM-PAC 6 Clicks Score  17  -SB         Functional Assessment    Outcome Measure Options  AM-PAC 6 Clicks Basic Mobility (PT)  -SB        User Key  (r) = Recorded By, (t) = Taken By, (c) = Cosigned By    Initials Name Provider Type    NW Linnea Mason, JEANNETTE Physical Therapy Assistant    Eliezer Marquez, OTR/L Occupational Therapist    MM Dariana Carey COTA/L Occupational Therapy Assistant    SB Ingrid Rice, PT Physical Therapist    LH Pedrito Barrow, OT Student OT Student           Time Calculation:   Time Calculation- OT     Row Name 06/15/19 1100 06/15/19 0801          Time Calculation- OT    OT Start Time  --  0801  -MM     OT Stop Time  --  0840  -MM     OT Time Calculation (min)  --  39 min  -MM     Total Timed Code Minutes- OT  --  39 minute(s)  -MM     OT Received On  --  06/15/19  -MM        Timed Charges    85708 - Gait Training Minutes   24  -NW  --     92316 - OT Self Care/Mgmt Minutes  --  39  -MM       User Key  (r) = Recorded By, (t) = Taken By, (c) = Cosigned By    Initials Name Provider Type    Linnea Orlando PTA Physical Therapy Assistant    MM Dariana Carey COTA/L Occupational Therapy Assistant        Therapy Charges for Today     Code Description Service Date Service Provider Modifiers Qty    [id number] HC OT SELF CARE/MGMT/TRAIN EA 15 MIN 6/15/2019 Dariana Carey COTA/L GO 3               RADHA Benitez  6/15/2019

## 2019-06-15 NOTE — THERAPY DISCHARGE NOTE
Acute Care - Occupational Therapy Discharge Summary  [facility]     Patient Name: Rupa Hernandez  : 1935  MRN: [id number]    Today's Date: 6/15/2019  Onset of Illness/Injury or Date of Surgery: 19    Date of Referral to OT: 19  Referring Physician: SHIV Mendieta      Admit Date: 2019        OT Recommendation and Plan    Visit Dx:    ICD-10-CM ICD-9-CM   1. Impaired mobility Z74.09 799.89   2. Impaired mobility and ADLs Z74.09 799.89         Time Calculation- OT     Row Name 06/15/19 1100 06/15/19 0801          Time Calculation- OT    OT Start Time  --  0801  -MM     OT Stop Time  --  0840  -MM     OT Time Calculation (min)  --  39 min  -MM     Total Timed Code Minutes- OT  --  39 minute(s)  -MM     OT Received On  --  06/15/19  -MM        Timed Charges    00620 - Gait Training Minutes   24  -NW  --     40124 - OT Self Care/Mgmt Minutes  --  39  -MM       User Key  (r) = Recorded By, (t) = Taken By, (c) = Cosigned By    Initials Name Provider Type    NW Linnea Mason PTA Physical Therapy Assistant    MM Dariana Carey COTA/L Occupational Therapy Assistant            Rehab Goal Summary     Row Name 06/15/19 1604 06/15/19 1543          Physical Therapy Goals    Bed Mobility Goal Selection (PT)  --  bed mobility, PT goal 1  -CW     Transfer Goal Selection (PT)  --  transfer, PT goal 1  -CW     Gait Training Goal Selection (PT)  --  gait training, PT goal 1  -CW        Bed Mobility Goal 1 (PT)    Activity/Assistive Device (Bed Mobility Goal 1, PT)  --  sidelying to sit;sit to sidelying;rolling to right;rolling to left  -CW     [place] Level/Cues Needed (Bed Mobility Goal 1, PT)  --  contact guard assist  -CW     Time Frame (Bed Mobility Goal 1, PT)  --  by discharge  -CW     Progress/Outcomes (Bed Mobility Goal 1, PT)  --  goal not met  -CW        Transfer Goal 1 (PT)    Activity/Assistive Device (Transfer Goal 1, PT)  --  sit-to-stand/stand-to-sit;bed-to-chair/chair-to-bed;walker, rolling   -CW     [place] Level/Cues Needed (Transfer Goal 1, PT)  --  contact guard assist  -CW     Time Frame (Transfer Goal 1, PT)  --  by discharge  -CW     Progress/Outcome (Transfer Goal 1, PT)  --  goal not met  -CW        Gait Training Goal 1 (PT)    Activity/Assistive Device (Gait Training Goal 1, PT)  --  gait (walking locomotion);decrease fall risk;increase endurance/gait distance;improve balance and speed;decrease asymmetrical patterns;assistive device use  -CW     [place] Level (Gait Training Goal 1, PT)  --  contact guard assist  -CW     Distance (Gait Goal 1, PT)  --  75  -CW     Time Frame (Gait Training Goal 1, PT)  --  by discharge  -CW     Progress/Outcome (Gait Training Goal 1, PT)  --  goal not met  -CW        Bed Mobility Goal 1 (OT)    Activity/Assistive Device (Bed Mobility Goal 1, OT)  rolling to left;rolling to right;sidelying to sit/sit to sidelying;bed rails  -MM  --     [place] Level/Cues Needed (Bed Mobility Goal 1, OT)  supervision required  -MM  --     Time Frame (Bed Mobility Goal 1, OT)  long term goal (LTG);by discharge  -MM  --     Progress/Outcomes (Bed Mobility Goal 1, OT)  goal not met  -MM  --        Bathing Goal 1 (OT)    Activity/Assistive Device (Bathing Goal 1, OT)  upper body bathing;lower body bathing;grab bar/tub rail;hand-held shower spray hose;long-handled sponge;shower chair;tub bench  -MM  --     [place] Level/Cues Needed (Bathing Goal 1, OT)  minimum assist (75% or more patient effort)  -MM  --     Time Frame (Bathing Goal 1, OT)  long term goal (LTG);by discharge  -MM  --     Progress/Outcomes (Bathing Goal 1, OT)  goal not met  -MM  --        Dressing Goal 1 (OT)    Activity/Assistive Device (Dressing Goal 1, OT)  upper body dressing;lower body dressing;sock-aid  -MM  --     [place]/Cues Needed (Dressing Goal 1, OT)  supervision required  -MM  --     Time Frame (Dressing Goal 1, OT)  long term goal (LTG);by discharge  -MM  --     Progress/Outcome  (Dressing Goal 1, OT)  goal not met  -MM  --        Toileting Goal 1 (OT)    Activity/Device (Toileting Goal 1, OT)  toileting skills, all  -MM  --     [place] Level/Cues Needed (Toileting Goal 1, OT)  minimum assist (75% or more patient effort)  -MM  --     Time Frame (Toileting Goal 1, OT)  long term goal (LTG);by discharge  -MM  --     Progress/Outcome (Toileting Goal 1, OT)  goal not met  -MM  --       User Key  (r) = Recorded By, (t) = Taken By, (c) = Cosigned By    Initials Name Provider Type Discipline    CW Tonia Haque, JEANNETTE Physical Therapy Assistant PT    MM Dariana Carey COTA/L Occupational Therapy Assistant OT          Outcome Measures     Row Name 06/15/19 1100 06/15/19 0801 06/14/19 3373       How much help from another person do you currently need...    Turning from your back to your side while in flat bed without using bedrails?  3  -NW  --  --    Moving from lying on back to sitting on the side of a flat bed without bedrails?  3  -NW  --  --    Moving to and from a bed to a chair (including a wheelchair)?  3  -NW  --  --    Standing up from a chair using your arms (e.g., wheelchair, bedside chair)?  3  -NW  --  --    Climbing 3-5 steps with a railing?  3  -NW  --  --    To walk in hospital room?  3  -NW  --  --    AM-PAC 6 Clicks Score  18  -NW  --  --       How much help from another is currently needed...    Putting on and taking off regular lower body clothing?  --  2  -MM  1  -MMA (r) LH (t) MMA (c)    Bathing (including washing, rinsing, and drying)  --  2  -MM  2  -MMA (r) LH (t) MMA (c)    Toileting (which includes using toilet bed pan or urinal)  --  2  -MM  2  -MMA (r) LH (t) MMA (c)    Putting on and taking off regular upper body clothing  --  3  -MM  3  -MMA (r) LH (t) MMA (c)    Taking care of personal grooming (such as brushing teeth)  --  3  -MM  3  -MMA (r) LH (t) MMA (c)    Eating meals  --  4  -MM  4  -MMA (r) LH (t) MMA (c)    Score  --  16  -MM  15  -MMA (r) LH  (t)       Functional Assessment    Outcome Measure Options  AM-PAC 6 Clicks Basic Mobility (PT)  -NW  --  AM-PAC 6 Clicks Daily Activity (OT)  -[facility] (r)  (t) [facility] (c)    Row Name 06/14/19 1300             How much help from another person do you currently need...    Turning from your back to your side while in flat bed without using bedrails?  3  -SB      Moving from lying on back to sitting on the side of a flat bed without bedrails?  3  -SB      Moving to and from a bed to a chair (including a wheelchair)?  3  -SB      Standing up from a chair using your arms (e.g., wheelchair, bedside chair)?  3  -SB      Climbing 3-5 steps with a railing?  2  -SB      To walk in hospital room?  3  -SB      AM-PAC 6 Clicks Score  17  -SB         Functional Assessment    Outcome Measure Options  AM-PAC 6 Clicks Basic Mobility (PT)  -SB        User Key  (r) = Recorded By, (t) = Taken By, (c) = Cosigned By    Initials Name Provider Type    NW Linnea Mason, PTA Physical Therapy Assistant    Eliezer Marquez, OTR/L Occupational Therapist    MM Dariana Carey COTA/L Occupational Therapy Assistant    SB Ingrid Rice, PT Physical Therapist    Pedrito Diop, OT Student OT Student          Therapy Suggested Charges     Code   Minutes Charges    66964 (CPT®) Hc Ot Neuromusc Re Education Ea 15 Min      46081 (CPT®) Hc Ot Ther Proc Ea 15 Min      66710 (CPT®) Hc Ot Therapeutic Act Ea 15 Min      59146 (CPT®) Hc Ot Manual Therapy Ea 15 Min      16447 (CPT®) Hc Ot Iontophoresis Ea 15 Min      26329 (CPT®) Hc Ot Elec Stim Ea-Per 15 Min      78107 (CPT®) Hc Ot Ultrasound Ea 15 Min      00173 (CPT®) Hc Ot Self Care/Mgmt/Train Ea 15 Min 39 3    Total  39 3          Therapy Charges for Today     Code Description Service Date Service Provider Modifiers Qty    [id number] HC OT SELF CARE/MGMT/TRAIN EA 15 MIN 6/15/2019 Dariana Carey COTA/L GO 3          OT Discharge Summary  Reason for Discharge: Discharge from  facility  Outcomes Achieved: Refer to plan of care for updates on goals achieved  Discharge Destination: Home with home health      RADHA Benitez  6/15/2019

## 2019-06-15 NOTE — DISCHARGE SUMMARY
Date of Discharge:  6/15/2019    Discharge Diagnosis: Lumbar compression fracture    Presenting Problem/History of Present Illness  Lumbar compression fracture (CMS/[facility]) [S32.000A]       Hospital Course  Patient is a 83 y.o. female presented with L1 compression fracture on MRI.  She fell at home from standing.  She presented with back pain.  She has a severe dementia at baseline.  She was seen admitted to Dr. Sandy's service.  She was braced and mobilize with physical therapy.  Dr. Sandy I would like to observe her as an outpatient and attempt conservative care prior to considering any surgical intervention.  At this point the family expressed the desire to take her home and care for her with home health services..      Procedures Performed         Consults:   Consults     No orders found from 5/15/2019 to 6/14/2019.          Pertinent Test Results: radiology: X-Ray: L1 compression fracture on x-ray and MRI    Condition on Discharge: Stable    Vital Signs  Temp:  [97.9 °F (36.6 °C)-98.7 °F (37.1 °C)] 97.9 °F (36.6 °C)  Heart Rate:  [77-84] 77  Resp:  [16-18] 18  BP: (119-147)/(47-65) 121/54    Physical Exam:   Physical Exam   Neurological: She has normal strength.   Psychiatric: Her speech is normal.        Neurologic Exam     Mental Status   Attention: normal.   Speech: speech is normal   Level of consciousness: alert  Confused oriented to self     Cranial Nerves   Cranial nerves II through XII intact.     Motor Exam   Muscle bulk: normal  Overall muscle tone: normal  Right arm pronator drift: absent  Left arm pronator drift: absent    Strength   Strength 5/5 throughout.     Sensory Exam   Light touch normal.   Pinprick normal.          Discharge Disposition  Home or Self Care    Discharge Medications     Discharge Medications      Continue These Medications      Instructions Start Date   amLODIPine 5 MG tablet  Commonly known as:  NORVASC   5 mg, Oral, Daily      CALCIUM 600+D3 PLUS MINERALS 600-800  MG-UNIT tablet   1 tablet, Oral, Daily      cetirizine 10 MG tablet  Commonly known as:  zyrTEC   10 mg, Oral, Daily      CRANBERRY PO   25,000 tablets, Oral, Daily      diclofenac 1 % gel gel  Commonly known as:  VOLTAREN   4 g, Topical, Daily PRN      docusate sodium 250 MG capsule  Commonly known as:  COLACE   250 mg, Oral, Daily      fish oil 1000 MG capsule capsule   1,000 mg, Oral, Daily With Breakfast      ipratropium 0.03 % nasal spray  Commonly known as:  ATROVENT   2 sprays, Nasal, Every 12 Hours PRN      lisinopril-hydrochlorothiazide 20-12.5 MG per tablet  Commonly known as:  PRINZIDE,ZESTORETIC   1 tablet, Oral, Daily      MULTI-VITAMIN PO   1 tablet, Oral, Daily      NAMENDA XR 28 MG capsule sustained-release 24 hr extended release capsule  Generic drug:  memantine   28 mg, Oral, Daily      PARoxetine 20 MG tablet  Commonly known as:  PAXIL   20 mg, Oral, Daily      pravastatin 40 MG tablet  Commonly known as:  PRAVACHOL   40 mg, Oral, Nightly      rivaroxaban 20 MG tablet  Commonly known as:  XARELTO   20 mg, Oral, Daily             Discharge Diet:   Diet Instructions     Advance Diet As Tolerated            Activity at Discharge:   Activity Instructions     Other Activity Restrictions      Type of Restriction:  Other    Explain Other Restrictions:  Brace when upright, no strenuous activities    Up WIth Assist      LSO brace when upright, sitting, standing, walking          Follow-up Appointments  Future Appointments   Date Time Provider Department Center   7/15/2019 10:30 AM Jaja Loo APRN [facility] None     Additional Instructions for the Follow-ups that You Need to Schedule     Call MD With Problems / Concerns   As directed      Instructions: Any worsening of back pain call MD or go to emergency room    Order Comments:  Instructions: Any worsening of back pain call MD or go to emergency room          Discharge Follow-up with Specified Provider: Dr Sandy; 2 Weeks   As directed      To:   Dr Sandy    Follow Up:  2 Weeks               Test Results Pending at Discharge       Jurgen Mobley MD  06/15/19  12:55 PM    Time: Discharge 45 min

## 2019-06-15 NOTE — PROGRESS NOTES
Neurosurgery Daily Progress Note    Assessment:   Rupa Hernandez is a 83 y.o. with a significant PMH of significant comorbidity dementia currently on blood thinners.  She presents with a new problem of falling from standing height. Physical exam findings of thoracolumbar point tenderness but otherwise neurologically intact.  Their imaging shows relatively minor L1 compression fracture.      DDX:     Lumbar compression fracture (CMS/HCC)    Patient Active Problem List   Diagnosis   • Essential hypertension   • Mixed hyperlipidemia   • Cerebrovascular accident (CVA) (CMS/HCC)   • PAF (paroxysmal atrial fibrillation) (CMS/HCC)   • Vascular dementia without behavioral disturbance   • Depression   • Dementia   • Cerebrovascular small vessel disease   • Nonrheumatic aortic valve stenosis Mild 2015 Echo   • Hypertensive left ventricular hypertrophy, without heart failure Mild to moderate 2015 Echo   • Lumbar compression fracture (CMS/HCC)     Plan:   Neuro: Stable.  Intermittent confusion with history of dementia   MRI shows acute compression fracture of the superior endplate of L1.  Appears stable   LSO brace at all times when out of bed   X-rays of the lumbar spine: Approximately 30% height loss of L1 relatively unchanged upright and supine.  CV: IDALIA.  Pulm: IDALIA.  Continuous pulse oximetry  : IDALIA.  Voiding spontaneously  FEN: IDALIA.  Tolerating oral diet  GI: IDALIA.  ID: IDALIA.  Heme:  DVT prophylaxis  Pain: Tolerable.  Waxes and wanes.  Some improvement with changes to Demerol  Dispo: PT/OT    Chief complaint:   Back pain post fall    Subjective  Symptoms stable.  Doing well    Temp:  [98.2 °F (36.8 °C)-98.7 °F (37.1 °C)] 98.3 °F (36.8 °C)  Heart Rate:  [77-84] 79  Resp:  [16-18] 18  BP: (119-156)/(47-91) 138/65    Objective:  General Appearance:  Comfortable, well-appearing and in no acute distress.    Vital signs: (most recent): Blood pressure 138/65, pulse 79, temperature 98.3 °F (36.8 °C), temperature source Oral, resp.  "rate 18, height 157.5 cm (62\"), weight 89.1 kg (196 lb 6.4 oz), SpO2 93 %.        Neurologic Exam     Mental Status   Oriented to person.   Disoriented to place.   Disoriented to time.   Attention: decreased. Concentration: normal.   Speech: speech is normal   Level of consciousness: alert  Follow simple commands     Cranial Nerves     CN II   Visual fields full to confrontation.     CN III, IV, VI   Pupils are equal, round, and reactive to light.  Extraocular motions are normal.     CN V   Facial sensation intact.     CN VII   Facial expression full, symmetric.     CN VIII   CN VIII normal.     CN IX, X   CN IX normal.     CN XI   CN XI normal.     Motor Exam   Muscle bulk: normal  Overall muscle tone: normal  Right arm tone: normal  Left arm tone: normal  Right arm pronator drift: absent  Left arm pronator drift: absent  Right leg tone: normal  Left leg tone: normal    Strength   Right deltoid: 5/5  Left deltoid: 5/5  Right biceps: 5/5  Left biceps: 5/5  Right triceps: 5/5  Left triceps: 5/5  Right wrist extension: 5/5  Left wrist extension: 5/5  Right iliopsoas: 5/5  Left iliopsoas: 5/5  Right quadriceps: 5/5  Left quadriceps: 5/5  Right anterior tibial: 5/5  Left anterior tibial: 5/5  Right posterior tibial: 5/5  Left posterior tibial: 5/5    Sensory Exam   Right arm light touch: normal  Left arm light touch: normal  Right leg light touch: normal  Left leg light touch: normal    Gait, Coordination, and Reflexes     Tremor   Resting tremor: absent  Intention tremor: absent  Action tremor: absent    Reflexes   Right plantar: normal  Left plantar: normal  Right Calderon: absent  Left Calderon: absent  Right ankle clonus: absent  Left ankle clonus: absent  Right pendular knee jerk: absent  Left pendular knee jerk: absent    Drains:  NA    Imaging Results (last 24 hours)     Procedure Component Value Units Date/Time    XR Spine Lumbar 2 or 3 View [[id number]] Collected:  06/14/19 1430     Updated:  06/14/19 1436    " Narrative:       EXAM: XR SPINE LUMBAR 2 OR 3 VW- - 6/14/2019 2:07 PM CDT     HISTORY: L1 compression fracture; Z74.09-Other reduced mobility;  Z74.09-Other reduced mobility       COMPARISON: 06/13/2019.      TECHNIQUE:  2 images.  Lateral supine and upright views     FINDINGS:    There is about 30 percent height loss of L1 with fracture of the  anterior margin. Normal alignment. Normal disc heights. Probable facet  hypertrophy in the lower lumbar spine. Calcified aortic atherosclerosis.          Impression:       Redemonstration of 30 percent height loss of L1. Normal  alignment.  This report was finalized on 06/14/2019 14:33 by Dr Nano Zaragoza MD.        Lab Results (last 24 hours)     ** No results found for the last 24 hours. **        [id number]  Pascual Mendieta, APRN

## 2019-06-15 NOTE — PROGRESS NOTES
Continued Stay Note  [facility]     Patient Name: Rupa Hernandez  MRN: [id number]  Today's Date: 6/15/2019    Admit Date: 6/13/2019    Discharge Plan     Row Name 06/15/19 4274       Plan    Plan Comments  LUIS F spoke with pt's , Fam, in regards to discharge plans. Fam states that he feels safe for discharge at this time. MD aware that pt is ready for discharge. Fam states that he has a supportive family to help with pt and denies any needs for HH at this time. LUIS F will follow and assist with any other discharge needs that may arise.    Final Discharge Disposition Code  01 - home or self-care    Row Name 06/15/19 1216       Plan    Plan Comments  LUIS F spoke with Kayla Ordonez RN at [facility], [place] and facility is not able to accept pt to swing bed without a 3 day inpatient stay. LUIS F has discussed situation with pt's  and he states that he is making phone calls. SW will await phone call back from .         Discharge Codes    No documentation.             Siria Rosario

## 2019-06-15 NOTE — PROGRESS NOTES
Continued Stay Note   [facility]     Patient Name: Rupa Hernandez  MRN: [id number]  Today's Date: 6/15/2019    Admit Date: 6/13/2019    Discharge Plan     Row Name 06/15/19 1216       Plan    Plan Comments  LUIS F spoke with Mery Charge RN at [facility] bed and facility is not able to accept pt to swing bed without a 3 day inpatient stay. SW has discussed situation with pt's  and he states that he is making phone calls. SW will await phone call back from .         Discharge Codes    No documentation.             Siria Rosario

## 2019-07-01 ENCOUNTER — OFFICE VISIT (OUTPATIENT)
Dept: NEUROSURGERY | Facility: CLINIC | Age: 84
End: 2019-07-01

## 2019-07-01 VITALS — BODY MASS INDEX: 36.07 KG/M2 | WEIGHT: 196 LBS | HEIGHT: 62 IN

## 2019-07-01 DIAGNOSIS — S32.010G CLOSED COMPRESSION FRACTURE OF L1 LUMBAR VERTEBRA WITH DELAYED HEALING, SUBSEQUENT ENCOUNTER: Primary | ICD-10-CM

## 2019-07-01 DIAGNOSIS — E66.01 CLASS 2 SEVERE OBESITY DUE TO EXCESS CALORIES WITH SERIOUS COMORBIDITY AND BODY MASS INDEX (BMI) OF 35.0 TO 35.9 IN ADULT (HCC): ICD-10-CM

## 2019-07-01 DIAGNOSIS — Z78.9 NON-TOBACCO USER: ICD-10-CM

## 2019-07-01 PROBLEM — E66.812 CLASS 2 SEVERE OBESITY DUE TO EXCESS CALORIES WITH SERIOUS COMORBIDITY AND BODY MASS INDEX (BMI) OF 35.0 TO 35.9 IN ADULT: Status: ACTIVE | Noted: 2019-07-01

## 2019-07-01 PROBLEM — S32.010A CLOSED COMPRESSION FRACTURE OF L1 LUMBAR VERTEBRA: Status: ACTIVE | Noted: 2019-07-01

## 2019-07-01 PROCEDURE — 99215 OFFICE O/P EST HI 40 MIN: CPT | Performed by: NEUROLOGICAL SURGERY

## 2019-07-01 NOTE — H&P (VIEW-ONLY)
Chief complaint:   Chief Complaint   Patient presents with   • Follow-up     Hospital follow up for L1 compression fracture. Patient has dementia, so her history is limited. Patient's  states that she complains of pain and had a pain shot today before her exam.       Subjective     HPI:   From previous note: Rupa Hernandez is a 83 y.o. with a significant PMH of significant comorbidity dementia currently on blood thinners.  She presents with a new problem of falling from standing height. Physical exam findings of thoracolumbar point tenderness but otherwise neurologically intact.  Their imaging shows relatively minor L1 compression fracture.      Interval History: Rupa returns today for follow-up of an L1 compression fracture.  She is previously been evaluated as an inpatient and MRI showed subacute compression fracture at L1 which was stable and a brace.  She was discharged to a swing bed.  She has not made significant improvement.  She is still requiring a wheelchair and walker.  She still has severe intermittent pain and is requiring IV pain medication at least 3 times a day.  She has no other evidence of neurological compromise.  She is here with her  today who is her medical decision-maker and due to dementia makes most of her decisions.    Review of Systems   Constitutional: Negative.    HENT: Negative.    Eyes: Negative.    Respiratory: Negative.    Cardiovascular: Negative.    Gastrointestinal: Negative.    Endocrine: Negative.    Genitourinary: Negative.    Musculoskeletal: Positive for back pain.   Skin: Negative.    Allergic/Immunologic: Negative.    Neurological: Negative.    Hematological: Negative.    Psychiatric/Behavioral: Negative.        PFSH:  Past Medical History:   Diagnosis Date   • Dementia    • Depression    • Hypertension    • Rectal polyp    • Stroke (CMS/HCC)        Past Surgical History:   Procedure Laterality Date   • APPENDECTOMY     • CATARACT EXTRACTION     • COLONOSCOPY   "07/14/2014    diverticulosis   • DILATION AND CURETTAGE, DIAGNOSTIC / THERAPEUTIC     • HYSTERECTOMY     • KNEE SURGERY Bilateral     X2   • OOPHORECTOMY     • ROTATOR CUFF REPAIR Right        Objective      Current Outpatient Medications   Medication Sig Dispense Refill   • amLODIPine (NORVASC) 5 MG tablet Take 5 mg by mouth Daily.     • Calcium Carbonate-Vit D-Min (CALCIUM 600+D3 PLUS MINERALS) 600-800 MG-UNIT tablet Take 1 tablet by mouth Daily.     • cetirizine (ZyrTEC) 10 MG tablet Take 10 mg by mouth Daily.     • CRANBERRY PO Take 25,000 tablets by mouth Daily.     • diclofenac (VOLTAREN) 1 % gel gel Apply 4 g topically to the appropriate area as directed Daily As Needed (Knee pain).     • docusate sodium (COLACE) 250 MG capsule Take 250 mg by mouth Daily.     • ipratropium (ATROVENT) 0.03 % nasal spray 2 sprays into the nostril(s) as directed by provider Every 12 (Twelve) Hours As Needed for Rhinitis.     • lisinopril-hydrochlorothiazide (PRINZIDE,ZESTORETIC) 20-12.5 MG per tablet Take 1 tablet by mouth Daily.     • memantine (NAMENDA XR) 28 MG capsule sustained-release 24 hr extended release capsule Take 28 mg by mouth Daily.     • Multiple Vitamin (MULTI-VITAMIN PO) Take 1 tablet by mouth Daily.     • Omega-3 Fatty Acids (FISH OIL) 1000 MG capsule capsule Take 1,000 mg by mouth Daily With Breakfast.     • PARoxetine (PAXIL) 20 MG tablet Take 20 mg by mouth Daily.     • pravastatin (PRAVACHOL) 40 MG tablet Take 40 mg by mouth Every Night.     • rivaroxaban (XARELTO) 20 MG tablet Take 1 tablet by mouth Daily. 90 tablet 3     No current facility-administered medications for this visit.        Vital Signs  Ht 157.5 cm (62\")   Wt 88.9 kg (196 lb)   BMI 35.85 kg/m²   Physical Exam   Constitutional: She is oriented to person, place, and time.   Eyes: EOM are normal. Pupils are equal, round, and reactive to light.   Neurological: She is oriented to person, place, and time. She has normal strength. Gait normal.   "   Psychiatric: Her speech is normal.     Neurologic Exam     Mental Status   Oriented to person, place, and time.   Speech: speech is normal     Cranial Nerves     CN II   Visual fields full to confrontation.     CN III, IV, VI   Pupils are equal, round, and reactive to light.  Extraocular motions are normal.     CN V   Right facial sensation deficit: none  Left facial sensation deficit: none    CN VII   Facial expression full, symmetric.     CN VIII   Hearing: intact    CN IX, X   Palate: symmetric    CN XI   Right sternocleidomastoid strength: normal  Left sternocleidomastoid strength: normal    CN XII   Tongue deviation: none    Motor Exam     Strength   Strength 5/5 throughout.     Sensory Exam   Right arm light touch: normal  Left arm light touch: normal    Gait, Coordination, and Reflexes     Gait  Gait: normal    Reflexes   Reflexes 2+ except as noted.     Tenderness to palpation of T/L junction  (12 bullet pts)    Results Review:   Xr Spine Lumbar 2 Or 3 View    Result Date: 6/14/2019  Redemonstration of 30 percent height loss of L1. Normal alignment. This report was finalized on 06/14/2019 14:33 by Dr Nano Zaragoza MD.    Xr Ankle 3+ View Left    Result Date: 6/13/2019  1. Soft tissue swelling. No acute bony finding. Partially limited evaluation due to the nonstandard lateral view. If persistent clinical concern, consider repeat lateral view. 2. Possible old avulsion of the medial malleolus. This report was finalized on 06/13/2019 16:02 by Dr Nano Zaragoza MD.    Mri Lumbar Spine Without Contrast    Result Date: 6/13/2019  1. Acute/subacute L1 fracture with loss of height by approximately 30% and only minimal retropulsion by 2 mm. 2. Mild degenerative disc change with moderate facet osteoarthropathy. There is mild lower lumbar spinal canal stenosis and mild neural foramen narrowing as described above.   This report was finalized on 06/13/2019 16:42 by Dr. Carol Hernandez MD.               Assessment/Plan: Rupa Hernandez is a 84 y.o. female with a significant comorbidity hypertension, hyperlipidemia, cerebrovascular accident, paroxysmal atrial fibrillation on blood thinners, and dementia. She presents with a known problem of L1 acute compression fracture. Physical exam findings of tenderness to palpation over T/L spine.  Her imaging shows acute to subacute fracture of L1 based on MRI without central compression.  Today we discussed the risk benefits and possible complications of continued conservative management versus kyphoplasty.    The patient accepted and understood all potential risks and complications including extravasation of cement, vascular injury, air or fat embolus, nerve root compression, hematoma, damage to the spinal cord, bowel or bladder incontinence, difficulty walking or weakness.  We will proceed with L1 kyphoplasty at earliest possible opportunity.  The patient is on Xarelto and needs to be off for 5 days prior surgery.  We will contact Dr. Sanchez for clearance.    Obesity Counseling  We discussed Rupa's current weight and the effect on her spine, including premature dis degeneration and increased anterior force on the lumbar spine resulting in worsening facet arthropathy.  Rupa has a BMI 35.0-39.9        Classification: class II obesity.  We spent 1 minutes in weight management counseling including discussing current weight, current diet, and exercise patterns.  Additional we set goals for weight reduction.  Therefore above normal parameters. Recommendations include: educational material.       1. Closed compression fracture of L1 lumbar vertebra with delayed healing, subsequent encounter    2. Class 2 severe obesity due to excess calories with serious comorbidity and body mass index (BMI) of 35.0 to 35.9 in adult (CMS/[facility])    3. Non-tobacco user        Recommendations:  Rupa was seen today for follow-up.    Diagnoses and all orders for this visit:    Closed compression fracture  of L1 lumbar vertebra with delayed healing, subsequent encounter  -     Case Request; Standing  -     ceFAZolin (ANCEF) 2 g in sodium chloride 0.9 % 100 mL IVPB  -     Case Request  -     Ambulatory Referral to Cardiology    Class 2 severe obesity due to excess calories with serious comorbidity and body mass index (BMI) of 35.0 to 35.9 in adult (CMS/[facility])    Non-tobacco user    Other orders  -     Follow Anesthesia Guidelines / Standing Orders; Future  -     Obtain Informed Consent; Future  -     Provide NPO Instructions to Patient; Future  -     Follow Anesthesia Guidelines / Standing Orders; Standing  -     Chlorhexidine Skin Prep; Future  -     Type & Screen; Standing        Return for after surgery.    Level of Risk: Decision for main OR  MDM: Moderate Complexity  (Mod = 86640, High = 33008)    Thank you, for allowing me to continue to participate in the care of this patient.    Sincerely,  Marco Sandy MD

## 2019-07-01 NOTE — PROGRESS NOTES
Chief complaint:   Chief Complaint   Patient presents with   • Follow-up     Hospital follow up for L1 compression fracture. Patient has dementia, so her history is limited. Patient's  states that she complains of pain and had a pain shot today before her exam.       Subjective     HPI:   From previous note: Rupa Hernandez is a 83 y.o. with a significant PMH of significant comorbidity dementia currently on blood thinners.  She presents with a new problem of falling from standing height. Physical exam findings of thoracolumbar point tenderness but otherwise neurologically intact.  Their imaging shows relatively minor L1 compression fracture.      Interval History: Rupa returns today for follow-up of an L1 compression fracture.  She is previously been evaluated as an inpatient and MRI showed subacute compression fracture at L1 which was stable and a brace.  She was discharged to a swing bed.  She has not made significant improvement.  She is still requiring a wheelchair and walker.  She still has severe intermittent pain and is requiring IV pain medication at least 3 times a day.  She has no other evidence of neurological compromise.  She is here with her  today who is her medical decision-maker and due to dementia makes most of her decisions.    Review of Systems   Constitutional: Negative.    HENT: Negative.    Eyes: Negative.    Respiratory: Negative.    Cardiovascular: Negative.    Gastrointestinal: Negative.    Endocrine: Negative.    Genitourinary: Negative.    Musculoskeletal: Positive for back pain.   Skin: Negative.    Allergic/Immunologic: Negative.    Neurological: Negative.    Hematological: Negative.    Psychiatric/Behavioral: Negative.        PFSH:  Past Medical History:   Diagnosis Date   • Dementia    • Depression    • Hypertension    • Rectal polyp    • Stroke (CMS/HCC)        Past Surgical History:   Procedure Laterality Date   • APPENDECTOMY     • CATARACT EXTRACTION     • COLONOSCOPY   "07/14/2014    diverticulosis   • DILATION AND CURETTAGE, DIAGNOSTIC / THERAPEUTIC     • HYSTERECTOMY     • KNEE SURGERY Bilateral     X2   • OOPHORECTOMY     • ROTATOR CUFF REPAIR Right        Objective      Current Outpatient Medications   Medication Sig Dispense Refill   • amLODIPine (NORVASC) 5 MG tablet Take 5 mg by mouth Daily.     • Calcium Carbonate-Vit D-Min (CALCIUM 600+D3 PLUS MINERALS) 600-800 MG-UNIT tablet Take 1 tablet by mouth Daily.     • cetirizine (ZyrTEC) 10 MG tablet Take 10 mg by mouth Daily.     • CRANBERRY PO Take 25,000 tablets by mouth Daily.     • diclofenac (VOLTAREN) 1 % gel gel Apply 4 g topically to the appropriate area as directed Daily As Needed (Knee pain).     • docusate sodium (COLACE) 250 MG capsule Take 250 mg by mouth Daily.     • ipratropium (ATROVENT) 0.03 % nasal spray 2 sprays into the nostril(s) as directed by provider Every 12 (Twelve) Hours As Needed for Rhinitis.     • lisinopril-hydrochlorothiazide (PRINZIDE,ZESTORETIC) 20-12.5 MG per tablet Take 1 tablet by mouth Daily.     • memantine (NAMENDA XR) 28 MG capsule sustained-release 24 hr extended release capsule Take 28 mg by mouth Daily.     • Multiple Vitamin (MULTI-VITAMIN PO) Take 1 tablet by mouth Daily.     • Omega-3 Fatty Acids (FISH OIL) 1000 MG capsule capsule Take 1,000 mg by mouth Daily With Breakfast.     • PARoxetine (PAXIL) 20 MG tablet Take 20 mg by mouth Daily.     • pravastatin (PRAVACHOL) 40 MG tablet Take 40 mg by mouth Every Night.     • rivaroxaban (XARELTO) 20 MG tablet Take 1 tablet by mouth Daily. 90 tablet 3     No current facility-administered medications for this visit.        Vital Signs  Ht 157.5 cm (62\")   Wt 88.9 kg (196 lb)   BMI 35.85 kg/m²   Physical Exam   Constitutional: She is oriented to person, place, and time.   Eyes: EOM are normal. Pupils are equal, round, and reactive to light.   Neurological: She is oriented to person, place, and time. She has normal strength. Gait normal. "   Psychiatric: Her speech is normal.     Neurologic Exam     Mental Status   Oriented to person, place, and time.   Speech: speech is normal     Cranial Nerves     CN II   Visual fields full to confrontation.     CN III, IV, VI   Pupils are equal, round, and reactive to light.  Extraocular motions are normal.     CN V   Right facial sensation deficit: none  Left facial sensation deficit: none    CN VII   Facial expression full, symmetric.     CN VIII   Hearing: intact    CN IX, X   Palate: symmetric    CN XI   Right sternocleidomastoid strength: normal  Left sternocleidomastoid strength: normal    CN XII   Tongue deviation: none    Motor Exam     Strength   Strength 5/5 throughout.     Sensory Exam   Right arm light touch: normal  Left arm light touch: normal    Gait, Coordination, and Reflexes     Gait  Gait: normal    Reflexes   Reflexes 2+ except as noted.     Tenderness to palpation of T/L junction  (12 bullet pts)    Results Review:   Xr Spine Lumbar 2 Or 3 View    Result Date: 6/14/2019  Redemonstration of 30 percent height loss of L1. Normal alignment. This report was finalized on 06/14/2019 14:33 by Dr Nano Zaragoza MD.    Xr Ankle 3+ View Left    Result Date: 6/13/2019  1. Soft tissue swelling. No acute bony finding. Partially limited evaluation due to the nonstandard lateral view. If persistent clinical concern, consider repeat lateral view. 2. Possible old avulsion of the medial malleolus. This report was finalized on 06/13/2019 16:02 by Dr Nano Zaragoza MD.    Mri Lumbar Spine Without Contrast    Result Date: 6/13/2019  1. Acute/subacute L1 fracture with loss of height by approximately 30% and only minimal retropulsion by 2 mm. 2. Mild degenerative disc change with moderate facet osteoarthropathy. There is mild lower lumbar spinal canal stenosis and mild neural foramen narrowing as described above.   This report was finalized on 06/13/2019 16:42 by Dr. Carol Hernandez MD.               Assessment/Plan: Rupa Hernandez is a 84 y.o. female with a significant comorbidity hypertension, hyperlipidemia, cerebrovascular accident, paroxysmal atrial fibrillation on blood thinners, and dementia. She presents with a known problem of L1 acute compression fracture. Physical exam findings of tenderness to palpation over T/L spine.  Her imaging shows acute to subacute fracture of L1 based on MRI without central compression.  Today we discussed the risk benefits and possible complications of continued conservative management versus kyphoplasty.    The patient accepted and understood all potential risks and complications including extravasation of cement, vascular injury, air or fat embolus, nerve root compression, hematoma, damage to the spinal cord, bowel or bladder incontinence, difficulty walking or weakness.  We will proceed with L1 kyphoplasty at earliest possible opportunity.  The patient is on Xarelto and needs to be off for 5 days prior surgery.  We will contact Dr. Sanchez for clearance.    Obesity Counseling  We discussed Rupa's current weight and the effect on her spine, including premature dis degeneration and increased anterior force on the lumbar spine resulting in worsening facet arthropathy.  Rupa has a BMI 35.0-39.9        Classification: class II obesity.  We spent 1 minutes in weight management counseling including discussing current weight, current diet, and exercise patterns.  Additional we set goals for weight reduction.  Therefore above normal parameters. Recommendations include: educational material.       1. Closed compression fracture of L1 lumbar vertebra with delayed healing, subsequent encounter    2. Class 2 severe obesity due to excess calories with serious comorbidity and body mass index (BMI) of 35.0 to 35.9 in adult (CMS/[facility])    3. Non-tobacco user        Recommendations:  Rupa was seen today for follow-up.    Diagnoses and all orders for this visit:    Closed compression fracture  of L1 lumbar vertebra with delayed healing, subsequent encounter  -     Case Request; Standing  -     ceFAZolin (ANCEF) 2 g in sodium chloride 0.9 % 100 mL IVPB  -     Case Request  -     Ambulatory Referral to Cardiology    Class 2 severe obesity due to excess calories with serious comorbidity and body mass index (BMI) of 35.0 to 35.9 in adult (CMS/[facility])    Non-tobacco user    Other orders  -     Follow Anesthesia Guidelines / Standing Orders; Future  -     Obtain Informed Consent; Future  -     Provide NPO Instructions to Patient; Future  -     Follow Anesthesia Guidelines / Standing Orders; Standing  -     Chlorhexidine Skin Prep; Future  -     Type & Screen; Standing        Return for after surgery.    Level of Risk: Decision for main OR  MDM: Moderate Complexity  (Mod = 66896, High = 25571)    Thank you, for allowing me to continue to participate in the care of this patient.    Sincerely,  Marco Sandy MD

## 2019-07-05 ENCOUNTER — APPOINTMENT (OUTPATIENT)
Dept: PREADMISSION TESTING | Facility: HOSPITAL | Age: 84
End: 2019-07-05

## 2019-07-05 VITALS
HEART RATE: 79 BPM | DIASTOLIC BLOOD PRESSURE: 66 MMHG | WEIGHT: 182.98 LBS | SYSTOLIC BLOOD PRESSURE: 146 MMHG | RESPIRATION RATE: 18 BRPM | HEIGHT: 60 IN | BODY MASS INDEX: 35.92 KG/M2 | OXYGEN SATURATION: 94 %

## 2019-07-05 PROCEDURE — 93005 ELECTROCARDIOGRAM TRACING: CPT

## 2019-07-05 PROCEDURE — 93010 ELECTROCARDIOGRAM REPORT: CPT | Performed by: INTERNAL MEDICINE

## 2019-07-05 RX ORDER — SENNOSIDES 8.6 MG
650 CAPSULE ORAL EVERY 8 HOURS PRN
COMMUNITY

## 2019-07-05 RX ORDER — PHENAZOPYRIDINE HYDROCHLORIDE 95 MG/1
95 TABLET ORAL 3 TIMES DAILY PRN
COMMUNITY
End: 2022-10-20

## 2019-07-05 RX ORDER — TRAMADOL HYDROCHLORIDE 50 MG/1
50 TABLET ORAL EVERY 4 HOURS PRN
COMMUNITY
End: 2019-09-18 | Stop reason: SDUPTHER

## 2019-07-05 NOTE — DISCHARGE INSTRUCTIONS
DAY OF SURGERY INSTRUCTIONS        YOUR SURGEON: AWA MORGAN    PROCEDURE: LUMBAR 1 KYPHOPLASTY    DATE OF SURGERY: July 11TH 2019    ARRIVAL TIME: AS DIRECTED BY OFFICE    YOU MAY TAKE THE FOLLOWING MEDICATION(S) THE MORNING OF SURGERY WITH A SIP OF WATER: AMLODIPINE      ALL OTHER HOME MEDICATION CHECK WITH YOUR PHYSICIAN                MANAGING PAIN AFTER SURGERY    We know you are probably wondering what your pain will be like after surgery.  Following surgery it is unrealistic to expect you will not have pain.   Pain is how our bodies let us know that something is wrong or cautions us to be careful.  That said, our goal is to make your pain tolerable.    Methods we may use to treat your pain include (oral or IV medications, PCAs, epidurals, nerve blocks, etc.)   While some procedures require IV pain medications for a short time after surgery, transitioning to pain medications by mouth allows for better management of pain.   Your nurse will encourage you to take oral pain medications whenever possible.  IV medications work almost immediately, but only last a short while.  Taking medications by mouth allows for a more constant level of medication in your blood stream for a longer period of time.      Once your pain is out of control it is harder to get back under control.  It is important you are aware when your next dose of pain medication is due.  If you are admitted, your nurse may write the time of your next dose on the white board in your room to help you remember.      We are interested in your pain and encourage you to inform us about aggravating factors during your visit.   Many times a simple repositioning every few hours can make a big difference.    If your physician says it is okay, do not let your pain prevent you from getting out of bed. Be sure to call your nurse for assistance prior to getting up so you do not fall.      Before surgery, please decide your tolerable pain goal.  These faces  help describe the pain ratings we use on a 0-10 scale.   Be prepared to tell us your goal and whether or not you take pain or anxiety medications at home.          BEFORE YOU COME TO THE HOSPITAL  (Pre-op instructions)  • Do not eat, drink, smoke or chew gum after midnight the night before surgery.  This also includes no mints.  • Morning of surgery take only the medicines you have been instructed with a sip of water unless otherwise instructed  by your physician.  • Do not shave, wear makeup or dark nail polish.  • Remove all jewelry including rings.  • Leave anything you consider valuable at home.  • Leave your suitcase in the car until after your surgery.  • Bring the following with you if applicable:  o Picture ID and insurance, Medicare or Medicaid cards  o Co-pay/deductible required by insurance (cash, check, credit card)  o Copy of advance directive, living will or power-of- documents if not brought to PAT  o CPAP or BIPAP mask and tubing  o Relaxation aids (MP3 player, book, magazine)  • On the day of surgery check in at registration located at the main entrance of the hospital.       Outpatient Surgery Guidelines, Adult  Outpatient procedures are those for which the person having the procedure is allowed to go home the same day as the procedure. Various procedures are done on an outpatient basis. You should follow some general guidelines if you will be having an outpatient procedure.  LET YOUR HEALTH CARE PROVIDER KNOW ABOUT:  · Any allergies you have.  · All medicines you are taking, including vitamins, herbs, eye drops, creams, and over-the-counter medicines.  · Previous problems you or members of your family have had with the use of anesthetics.  · Any blood disorders you have.  · Previous surgeries you have had.  · Medical conditions you have.  RISKS AND COMPLICATIONS  Your health care provider will discuss possible risks and complications with you before surgery. Common risks and complications  include:    · Problems due to the use of anesthetics.  · Blood loss and replacement (does not apply to minor surgical procedures).  · Temporary increase in pain due to surgery.  · Uncorrected pain or problems that the surgery was meant to correct.  · Infection.  · New damage.  BEFORE THE PROCEDURE  · Ask your health care provider about changing or stopping your regular medicines. You may need to stop taking certain medicines in the days or weeks before the procedure.  · Stop smoking at least 2 weeks before surgery. This lowers your risk for complications during and after surgery. Ask your health care provider for help with this if needed.  · Eat your usual meals and a light supper the day before surgery. Continue fluid intake. Do not drink alcohol.  · Do not eat or drink after midnight the night before your surgery.   · Arrange for someone to take you home and to stay with you for 24 hours after the procedure. Medicine given for your procedure may affect your ability to drive or to care for yourself.  · Call your health care provider's office if you develop an illness or problem that may prevent you from safely having your procedure.  AFTER THE PROCEDURE  After surgery, you will be taken to a recovery area, where your progress will be monitored. If there are no complications, you will be allowed to go home when you are awake, stable, and taking fluids well. You may have numbness around the surgical site. Healing will take some time. You will have tenderness at the surgical site and may have some swelling and bruising. You may also have some nausea.  HOME CARE INSTRUCTIONS  · Do not drive for 24 hours, or as directed by your health care provider. Do not drive while taking prescription pain medicines.  · Do not drink alcohol for 24 hours.  · Do not make important decisions or sign legal documents for 24 hours.  · You may resume a normal diet and activities as directed.  · Do not lift anything heavier than 10 pounds  (4.5 kg) or play contact sports until your health care provider says it is okay.  · Change your bandages (dressings) as directed.  · Only take over-the-counter or prescription medicines as directed by your health care provider.  · Follow up with your health care provider as directed.  SEEK MEDICAL CARE IF:  · You have increased bleeding (more than a small spot) from the surgical site.  · You have redness, swelling, or increasing pain in the wound.  · You see pus coming from the wound.  · You have a fever.  · You notice a bad smell coming from the wound or dressing.  · You feel lightheaded or faint.  · You develop a rash.  · You have trouble breathing.  · You develop allergies.  MAKE SURE YOU:  · Understand these instructions.  · Will watch your condition.  · Will get help right away if you are not doing well or get worse.     This information is not intended to replace advice given to you by your health care provider. Make sure you discuss any questions you have with your health care provider.     Document Released: 09/12/2002 Document Revised: 05/03/2016 Document Reviewed: 05/22/2014  [employer] Interactive Patient Education ©2016 [employer]       Fall Prevention in Hospitals, Adult  As a hospital patient, your condition and the treatments you receive can increase your risk for falls. Some additional risk factors for falls in a hospital include:  · Being in an unfamiliar environment.  · Being on bed rest.  · Your surgery.  · Taking certain medicines.  · Your tubing requirements, such as intravenous (IV) therapy or catheters.  It is important that you learn how to decrease fall risks while at the hospital. Below are important tips that can help prevent falls.  SAFETY TIPS FOR PREVENTING FALLS  Talk about your risk of falling.  · Ask your health care provider why you are at risk for falling. Is it your medicine, illness, tubing placement, or something else?  · Make a plan with your health care provider to keep you  safe from falls.  · Ask your health care provider or pharmacist about side effects of your medicines. Some medicines can make you dizzy or affect your coordination.  Ask for help.  · Ask for help before getting out of bed. You may need to press your call button.  · Ask for assistance in getting safely to the toilet.  · Ask for a walker or cane to be put at your bedside. Ask that most of the side rails on your bed be placed up before your health care provider leaves the room.  · Ask family or friends to sit with you.  · Ask for things that are out of your reach, such as your glasses, hearing aids, telephone, bedside table, or call button.  Follow these tips to avoid falling:  · Stay lying or seated, rather than standing, while waiting for help.  · Wear rubber-soled slippers or shoes whenever you walk in the hospital.  · Avoid quick, sudden movements.  ¨ Change positions slowly.  ¨ Sit on the side of your bed before standing.  ¨ Stand up slowly and wait before you start to walk.  · Let your health care provider know if there is a spill on the floor.  · Pay careful attention to the medical equipment, electrical cords, and tubes around you.  · When you need help, use your call button by your bed or in the bathroom. Wait for one of your health care providers to help you.  · If you feel dizzy or unsure of your footing, return to bed and wait for assistance.  · Avoid being distracted by the TV, telephone, or another person in your room.  · Do not lean or support yourself on rolling objects, such as IV poles or bedside tables.     This information is not intended to replace advice given to you by your health care provider. Make sure you discuss any questions you have with your health care provider.     Document Released: 12/15/2001 Document Revised: 01/08/2016 Document Reviewed: 08/25/2013  [employer] Interactive Patient Education ©2016 [employer]       Surgical Site Infections FAQs  What is a Surgical Site Infection  (SSI)?  A surgical site infection is an infection that occurs after surgery in the part of the body where the surgery took place. Most patients who have surgery do not develop an infection. However, infections develop in about 1 to 3 out of every 100 patients who have surgery.  Some of the common symptoms of a surgical site infection are:  · Redness and pain around the area where you had surgery  · Drainage of cloudy fluid from your surgical wound  · Fever  Can SSIs be treated?  Yes. Most surgical site infections can be treated with antibiotics. The antibiotic given to you depends on the bacteria (germs) causing the infection. Sometimes patients with SSIs also need another surgery to treat the infection.  What are some of the things that hospitals are doing to prevent SSIs?  To prevent SSIs, doctors, nurses, and other healthcare providers:  · Clean their hands and arms up to their elbows with an antiseptic agent just before the surgery.  · Clean their hands with soap and water or an alcohol-based hand rub before and after caring for each patient.  · May remove some of your hair immediately before your surgery using electric clippers if the hair is in the same area where the procedure will occur. They should not shave you with a razor.  · Wear special hair covers, masks, gowns, and gloves during surgery to keep the surgery area clean.  · Give you antibiotics before your surgery starts. In most cases, you should get antibiotics within 60 minutes before the surgery starts and the antibiotics should be stopped within 24 hours after surgery.  · Clean the skin at the site of your surgery with a special soap that kills germs.  What can I do to help prevent SSIs?  Before your surgery:  · Tell your doctor about other medical problems you may have. Health problems such as allergies, diabetes, and obesity could affect your surgery and your treatment.  · Quit smoking. Patients who smoke get more infections. Talk to your doctor  about how you can quit before your surgery.  · Do not shave near where you will have surgery. Shaving with a razor can irritate your skin and make it easier to develop an infection.  At the time of your surgery:  · Speak up if someone tries to shave you with a razor before surgery. Ask why you need to be shaved and talk with your surgeon if you have any concerns.  · Ask if you will get antibiotics before surgery.  After your surgery:  · Make sure that your healthcare providers clean their hands before examining you, either with soap and water or an alcohol-based hand rub.  · If you do not see your providers clean their hands, please ask them to do so.  · Family and friends who visit you should not touch the surgical wound or dressings.  · Family and friends should clean their hands with soap and water or an alcohol-based hand rub before and after visiting you. If you do not see them clean their hands, ask them to clean their hands.  What do I need to do when I go home from the hospital?  · Before you go home, your doctor or nurse should explain everything you need to know about taking care of your wound. Make sure you understand how to care for your wound before you leave the hospital.  · Always clean your hands before and after caring for your wound.  · Before you go home, make sure you know who to contact if you have questions or problems after you get home.  · If you have any symptoms of an infection, such as redness and pain at the surgery site, drainage, or fever, call your doctor immediately.  If you have additional questions, please ask your doctor or nurse.  Developed and co-sponsored by The Society for Healthcare Epidemiology of [place] (SHEA); Infectious Diseases Society of [place] (IDSA); American Hospital Association; Association for Professionals in Infection Control and Epidemiology (APIC); Centers for Disease Control and Prevention (CDC); and The Joint Commission.     This information is not intended  to replace advice given to you by your health care provider. Make sure you discuss any questions you have with your health care provider.     Document Released: 12/23/2014 Document Revised: 01/08/2016 Document Reviewed: 03/02/2016  [employer] Interactive Patient Education ©2016 [employer]       [facility]  CHG 4% Patient Instruction Sheet    Preparing the Skin Before Surgery  Preparing or “prepping” skin before surgery can reduce the risk of infection at the surgical site. To make the process easier,[facility] has chosen 4% Chlorhexidine Gluconate (CHG) antiseptic solution.   The steps below outline the prepping process and should be carefully followed.                                                                                                                                                      Prep the skin at the following time(s):                                                      We recommend you shower the night before surgery, and again the morning of surgery with the 4% CHG antiseptic solution using half of the bottle and a cloth each time.  Dress in clean clothes/sleepwear after showering.  See instructions below for application.          Do not apply any lotions or moisturizers.       Do not shave the area to be prepped for at least 2 days prior to surgery.    Clipping the hair may be done immediately prior to your surgery at the hospital    if needed.    Directions:  Thoroughly rinse your body with water.  Apply 4% CHG to a cloth and wash skin gently, paying special attention to the operative site.  Rinse again thoroughly.  Once you have begun using this product do not apply anything else to your skin. If itching or redness persists, rinse affected areas and discontinue use.    When using this product:  • Keep out of eyes, ears, and mouth.  • If solution should contact these areas, rinse out promptly and thoroughly with water.  • For external use only.  • Do not use in genital area, on your  face or head.      PATIENT/FAMILY/RESPONSIBLE PARTY VERBALIZES UNDERSTANDING OF ABOVE EDUCATION.  COPY OF PAIN SCALE GIVEN AND REVIEWED WITH VERBALIZED UNDERSTANDING.

## 2019-07-11 ENCOUNTER — APPOINTMENT (OUTPATIENT)
Dept: GENERAL RADIOLOGY | Facility: HOSPITAL | Age: 84
End: 2019-07-11

## 2019-07-11 ENCOUNTER — HOSPITAL ENCOUNTER (OUTPATIENT)
Facility: HOSPITAL | Age: 84
Setting detail: HOSPITAL OUTPATIENT SURGERY
Discharge: ANOTHER HOSPITAL | End: 2019-07-11
Attending: NEUROLOGICAL SURGERY | Admitting: NEUROLOGICAL SURGERY

## 2019-07-11 ENCOUNTER — ANESTHESIA (OUTPATIENT)
Dept: PERIOP | Facility: HOSPITAL | Age: 84
End: 2019-07-11

## 2019-07-11 ENCOUNTER — ANESTHESIA EVENT (OUTPATIENT)
Dept: PERIOP | Facility: HOSPITAL | Age: 84
End: 2019-07-11

## 2019-07-11 VITALS
SYSTOLIC BLOOD PRESSURE: 163 MMHG | HEART RATE: 63 BPM | DIASTOLIC BLOOD PRESSURE: 72 MMHG | OXYGEN SATURATION: 96 % | RESPIRATION RATE: 16 BRPM | TEMPERATURE: 98 F

## 2019-07-11 DIAGNOSIS — S32.010G CLOSED COMPRESSION FRACTURE OF L1 LUMBAR VERTEBRA WITH DELAYED HEALING, SUBSEQUENT ENCOUNTER: ICD-10-CM

## 2019-07-11 LAB
ABO GROUP BLD: NORMAL
ANTI-M: NORMAL
BIG S ANTIGEN: POSITIVE
BLD GP AB SCN SERPL QL: POSITIVE
GLUCOSE BLDC GLUCOMTR-MCNC: 140 MG/DL (ref 70–130)
M ANTIGEN: POSITIVE
NONSPECIFIC COLD ANTIBODY: NORMAL
RH BLD: POSITIVE
T&S EXPIRATION DATE: NORMAL

## 2019-07-11 PROCEDURE — 86900 BLOOD TYPING SEROLOGIC ABO: CPT | Performed by: NEUROLOGICAL SURGERY

## 2019-07-11 PROCEDURE — 22514 PERQ VERTEBRAL AUGMENTATION: CPT | Performed by: NEUROLOGICAL SURGERY

## 2019-07-11 PROCEDURE — 86870 RBC ANTIBODY IDENTIFICATION: CPT | Performed by: NEUROLOGICAL SURGERY

## 2019-07-11 PROCEDURE — 86902 BLOOD TYPE ANTIGEN DONOR EA: CPT

## 2019-07-11 PROCEDURE — 25010000002 DEXAMETHASONE PER 1 MG: Performed by: ANESTHESIOLOGY

## 2019-07-11 PROCEDURE — 72100 X-RAY EXAM L-S SPINE 2/3 VWS: CPT

## 2019-07-11 PROCEDURE — 25010000002 ONDANSETRON PER 1 MG: Performed by: NURSE ANESTHETIST, CERTIFIED REGISTERED

## 2019-07-11 PROCEDURE — 25010000002 DEXAMETHASONE PER 1 MG: Performed by: NURSE ANESTHETIST, CERTIFIED REGISTERED

## 2019-07-11 PROCEDURE — 88307 TISSUE EXAM BY PATHOLOGIST: CPT | Performed by: NEUROLOGICAL SURGERY

## 2019-07-11 PROCEDURE — 76000 FLUOROSCOPY <1 HR PHYS/QHP: CPT

## 2019-07-11 PROCEDURE — 86905 BLOOD TYPING RBC ANTIGENS: CPT | Performed by: NEUROLOGICAL SURGERY

## 2019-07-11 PROCEDURE — 86901 BLOOD TYPING SEROLOGIC RH(D): CPT | Performed by: NEUROLOGICAL SURGERY

## 2019-07-11 PROCEDURE — 99024 POSTOP FOLLOW-UP VISIT: CPT | Performed by: NURSE PRACTITIONER

## 2019-07-11 PROCEDURE — 25010000002 IOPAMIDOL 61 % SOLUTION: Performed by: NEUROLOGICAL SURGERY

## 2019-07-11 PROCEDURE — 86850 RBC ANTIBODY SCREEN: CPT | Performed by: NEUROLOGICAL SURGERY

## 2019-07-11 PROCEDURE — 25010000002 PROPOFOL 10 MG/ML EMULSION: Performed by: NURSE ANESTHETIST, CERTIFIED REGISTERED

## 2019-07-11 PROCEDURE — 86922 COMPATIBILITY TEST ANTIGLOB: CPT

## 2019-07-11 PROCEDURE — 86920 COMPATIBILITY TEST SPIN: CPT

## 2019-07-11 PROCEDURE — 25010000002 FENTANYL CITRATE (PF) 100 MCG/2ML SOLUTION: Performed by: NURSE ANESTHETIST, CERTIFIED REGISTERED

## 2019-07-11 PROCEDURE — 88311 DECALCIFY TISSUE: CPT | Performed by: NEUROLOGICAL SURGERY

## 2019-07-11 PROCEDURE — 82962 GLUCOSE BLOOD TEST: CPT

## 2019-07-11 PROCEDURE — C1713 ANCHOR/SCREW BN/BN,TIS/BN: HCPCS | Performed by: NEUROLOGICAL SURGERY

## 2019-07-11 DEVICE — BONE CEMENT C01B HV-R WITH MIXER  US
Type: IMPLANTABLE DEVICE | Status: FUNCTIONAL
Brand: KYPHON® HV-R® BONE CEMENT AND KYPHON® MIXER PACK

## 2019-07-11 RX ORDER — SODIUM CHLORIDE 0.9 % (FLUSH) 0.9 %
1-10 SYRINGE (ML) INJECTION AS NEEDED
Status: DISCONTINUED | OUTPATIENT
Start: 2019-07-11 | End: 2019-07-11 | Stop reason: HOSPADM

## 2019-07-11 RX ORDER — CEPHALEXIN 500 MG/1
500 CAPSULE ORAL 2 TIMES DAILY
COMMUNITY
End: 2019-09-18

## 2019-07-11 RX ORDER — FENTANYL CITRATE 50 UG/ML
INJECTION, SOLUTION INTRAMUSCULAR; INTRAVENOUS AS NEEDED
Status: DISCONTINUED | OUTPATIENT
Start: 2019-07-11 | End: 2019-07-11 | Stop reason: SURG

## 2019-07-11 RX ORDER — SODIUM CHLORIDE, SODIUM LACTATE, POTASSIUM CHLORIDE, CALCIUM CHLORIDE 600; 310; 30; 20 MG/100ML; MG/100ML; MG/100ML; MG/100ML
100 INJECTION, SOLUTION INTRAVENOUS CONTINUOUS
Status: DISCONTINUED | OUTPATIENT
Start: 2019-07-11 | End: 2019-07-11 | Stop reason: HOSPADM

## 2019-07-11 RX ORDER — SODIUM CHLORIDE 0.9 % (FLUSH) 0.9 %
3 SYRINGE (ML) INJECTION AS NEEDED
Status: DISCONTINUED | OUTPATIENT
Start: 2019-07-11 | End: 2019-07-11 | Stop reason: HOSPADM

## 2019-07-11 RX ORDER — LABETALOL HYDROCHLORIDE 5 MG/ML
5 INJECTION, SOLUTION INTRAVENOUS
Status: DISCONTINUED | OUTPATIENT
Start: 2019-07-11 | End: 2019-07-11 | Stop reason: HOSPADM

## 2019-07-11 RX ORDER — TRAMADOL HYDROCHLORIDE 50 MG/1
50 TABLET ORAL EVERY 6 HOURS PRN
Qty: 22 TABLET | Refills: 0 | Status: SHIPPED | OUTPATIENT
Start: 2019-07-11 | End: 2022-10-20

## 2019-07-11 RX ORDER — LIDOCAINE HYDROCHLORIDE 40 MG/ML
SOLUTION TOPICAL AS NEEDED
Status: DISCONTINUED | OUTPATIENT
Start: 2019-07-11 | End: 2019-07-11 | Stop reason: SURG

## 2019-07-11 RX ORDER — TRAMADOL HYDROCHLORIDE 50 MG/1
50 TABLET ORAL EVERY 6 HOURS PRN
Qty: 20 TABLET | Refills: 0 | Status: SHIPPED | OUTPATIENT
Start: 2019-07-11 | End: 2019-07-11 | Stop reason: SDUPTHER

## 2019-07-11 RX ORDER — IBUPROFEN 600 MG/1
600 TABLET ORAL ONCE AS NEEDED
Status: COMPLETED | OUTPATIENT
Start: 2019-07-11 | End: 2019-07-11

## 2019-07-11 RX ORDER — ONDANSETRON 2 MG/ML
4 INJECTION INTRAMUSCULAR; INTRAVENOUS ONCE AS NEEDED
Status: DISCONTINUED | OUTPATIENT
Start: 2019-07-11 | End: 2019-07-11 | Stop reason: HOSPADM

## 2019-07-11 RX ORDER — SODIUM CHLORIDE 0.9 % (FLUSH) 0.9 %
3 SYRINGE (ML) INJECTION EVERY 12 HOURS SCHEDULED
Status: DISCONTINUED | OUTPATIENT
Start: 2019-07-11 | End: 2019-07-11 | Stop reason: HOSPADM

## 2019-07-11 RX ORDER — SODIUM CHLORIDE, SODIUM LACTATE, POTASSIUM CHLORIDE, CALCIUM CHLORIDE 600; 310; 30; 20 MG/100ML; MG/100ML; MG/100ML; MG/100ML
1000 INJECTION, SOLUTION INTRAVENOUS CONTINUOUS
Status: DISCONTINUED | OUTPATIENT
Start: 2019-07-11 | End: 2019-07-11 | Stop reason: HOSPADM

## 2019-07-11 RX ORDER — MEPERIDINE HYDROCHLORIDE 50 MG/1
50 TABLET ORAL EVERY 4 HOURS PRN
Qty: 20 TABLET | Refills: 0 | Status: SHIPPED | OUTPATIENT
Start: 2019-07-11 | End: 2019-07-11 | Stop reason: SDUPTHER

## 2019-07-11 RX ORDER — ONDANSETRON 2 MG/ML
INJECTION INTRAMUSCULAR; INTRAVENOUS AS NEEDED
Status: DISCONTINUED | OUTPATIENT
Start: 2019-07-11 | End: 2019-07-11 | Stop reason: SURG

## 2019-07-11 RX ORDER — PROPOFOL 10 MG/ML
VIAL (ML) INTRAVENOUS AS NEEDED
Status: DISCONTINUED | OUTPATIENT
Start: 2019-07-11 | End: 2019-07-11 | Stop reason: SURG

## 2019-07-11 RX ORDER — FENTANYL CITRATE 50 UG/ML
25 INJECTION, SOLUTION INTRAMUSCULAR; INTRAVENOUS AS NEEDED
Status: DISCONTINUED | OUTPATIENT
Start: 2019-07-11 | End: 2019-07-11 | Stop reason: HOSPADM

## 2019-07-11 RX ORDER — MEPERIDINE HYDROCHLORIDE 50 MG/1
50 TABLET ORAL EVERY 4 HOURS PRN
Qty: 22 TABLET | Refills: 0 | Status: SHIPPED | OUTPATIENT
Start: 2019-07-11 | End: 2019-07-17

## 2019-07-11 RX ORDER — MAGNESIUM HYDROXIDE 1200 MG/15ML
LIQUID ORAL AS NEEDED
Status: DISCONTINUED | OUTPATIENT
Start: 2019-07-11 | End: 2019-07-11 | Stop reason: HOSPADM

## 2019-07-11 RX ORDER — ACETAMINOPHEN 500 MG
1000 TABLET ORAL ONCE
Status: COMPLETED | OUTPATIENT
Start: 2019-07-11 | End: 2019-07-11

## 2019-07-11 RX ORDER — NALOXONE HCL 0.4 MG/ML
0.4 VIAL (ML) INJECTION AS NEEDED
Status: DISCONTINUED | OUTPATIENT
Start: 2019-07-11 | End: 2019-07-11 | Stop reason: HOSPADM

## 2019-07-11 RX ORDER — ROCURONIUM BROMIDE 10 MG/ML
INJECTION, SOLUTION INTRAVENOUS AS NEEDED
Status: DISCONTINUED | OUTPATIENT
Start: 2019-07-11 | End: 2019-07-11 | Stop reason: SURG

## 2019-07-11 RX ORDER — LIDOCAINE HYDROCHLORIDE 20 MG/ML
INJECTION, SOLUTION INFILTRATION; PERINEURAL AS NEEDED
Status: DISCONTINUED | OUTPATIENT
Start: 2019-07-11 | End: 2019-07-11 | Stop reason: SURG

## 2019-07-11 RX ORDER — DEXAMETHASONE SODIUM PHOSPHATE 4 MG/ML
4 INJECTION, SOLUTION INTRA-ARTICULAR; INTRALESIONAL; INTRAMUSCULAR; INTRAVENOUS; SOFT TISSUE ONCE AS NEEDED
Status: COMPLETED | OUTPATIENT
Start: 2019-07-11 | End: 2019-07-11

## 2019-07-11 RX ORDER — DEXAMETHASONE SODIUM PHOSPHATE 4 MG/ML
INJECTION, SOLUTION INTRA-ARTICULAR; INTRALESIONAL; INTRAMUSCULAR; INTRAVENOUS; SOFT TISSUE AS NEEDED
Status: DISCONTINUED | OUTPATIENT
Start: 2019-07-11 | End: 2019-07-11 | Stop reason: SURG

## 2019-07-11 RX ORDER — SUCCINYLCHOLINE CHLORIDE 20 MG/ML
INJECTION INTRAMUSCULAR; INTRAVENOUS AS NEEDED
Status: DISCONTINUED | OUTPATIENT
Start: 2019-07-11 | End: 2019-07-11

## 2019-07-11 RX ORDER — IPRATROPIUM BROMIDE AND ALBUTEROL SULFATE 2.5; .5 MG/3ML; MG/3ML
3 SOLUTION RESPIRATORY (INHALATION) ONCE AS NEEDED
Status: DISCONTINUED | OUTPATIENT
Start: 2019-07-11 | End: 2019-07-11 | Stop reason: HOSPADM

## 2019-07-11 RX ORDER — BUPIVACAINE HCL/0.9 % NACL/PF 0.1 %
2 PLASTIC BAG, INJECTION (ML) EPIDURAL ONCE
Status: COMPLETED | OUTPATIENT
Start: 2019-07-11 | End: 2019-07-11

## 2019-07-11 RX ADMIN — SODIUM CHLORIDE, POTASSIUM CHLORIDE, SODIUM LACTATE AND CALCIUM CHLORIDE 1000 ML: 600; 310; 30; 20 INJECTION, SOLUTION INTRAVENOUS at 07:19

## 2019-07-11 RX ADMIN — ROCURONIUM BROMIDE 10 MG: 10 INJECTION INTRAVENOUS at 12:30

## 2019-07-11 RX ADMIN — FENTANYL CITRATE 100 MCG: 50 INJECTION, SOLUTION INTRAMUSCULAR; INTRAVENOUS at 12:30

## 2019-07-11 RX ADMIN — LIDOCAINE HYDROCHLORIDE 1 EACH: 40 SOLUTION TOPICAL at 12:30

## 2019-07-11 RX ADMIN — DEXAMETHASONE SODIUM PHOSPHATE 4 MG: 4 INJECTION, SOLUTION INTRAMUSCULAR; INTRAVENOUS at 11:00

## 2019-07-11 RX ADMIN — ACETAMINOPHEN 1000 MG: 500 TABLET, FILM COATED ORAL at 11:00

## 2019-07-11 RX ADMIN — SODIUM CHLORIDE, POTASSIUM CHLORIDE, SODIUM LACTATE AND CALCIUM CHLORIDE: 600; 310; 30; 20 INJECTION, SOLUTION INTRAVENOUS at 12:45

## 2019-07-11 RX ADMIN — LIDOCAINE HYDROCHLORIDE 100 MG: 20 INJECTION, SOLUTION INFILTRATION; PERINEURAL at 12:30

## 2019-07-11 RX ADMIN — DEXAMETHASONE SODIUM PHOSPHATE 4 MG: 4 INJECTION, SOLUTION INTRAMUSCULAR; INTRAVENOUS at 12:41

## 2019-07-11 RX ADMIN — IBUPROFEN 600 MG: 600 TABLET ORAL at 15:21

## 2019-07-11 RX ADMIN — PROPOFOL 150 MG: 10 INJECTION, EMULSION INTRAVENOUS at 12:30

## 2019-07-11 RX ADMIN — Medication 2 G: at 12:40

## 2019-07-11 RX ADMIN — ONDANSETRON HYDROCHLORIDE 4 MG: 2 SOLUTION INTRAMUSCULAR; INTRAVENOUS at 12:41

## 2019-07-11 NOTE — PROGRESS NOTES
Patient and family notified of scheduling delay due to emergent procedure.  Patient and family voiced understanding.    Pascual Mendieta, APRN

## 2019-07-11 NOTE — DISCHARGE INSTRUCTIONS
YOUR NEXT PAIN MEDICATION IS DUE AT______________         General Anesthesia, Adult, Care After  Refer to this sheet in the next few weeks. These instructions provide you with information on caring for yourself after your procedure. Your health care provider may also give you more specific instructions. Your treatment has been planned according to current medical practices, but problems sometimes occur. Call your health care provider if you have any problems or questions after your procedure.  WHAT TO EXPECT AFTER THE PROCEDURE  After the procedure, it is typical to experience:  · Sleepiness.  · Nausea and vomiting.  HOME CARE INSTRUCTIONS  · For the first 24 hours after general anesthesia:  ¨ Have a responsible person with you.  ¨ Do not drive a car. If you are alone, do not take public transportation.  ¨ Do not drink alcohol.  ¨ Do not take medicine that has not been prescribed by your health care provider.  ¨ Do not sign important papers or make important decisions.  ¨ You may resume a normal diet and activities as directed by your health care provider.  · Change bandages (dressings) as directed.  · If you have questions or problems that seem related to general anesthesia, call the hospital and ask for the anesthetist or anesthesiologist on call.  SEEK MEDICAL CARE IF:  · You have nausea and vomiting that continue the day after anesthesia.  · You develop a rash.  SEEK IMMEDIATE MEDICAL CARE IF:    · You have difficulty breathing.  · You have chest pain.  · You have any allergic problems.     This information is not intended to replace advice given to you by your health care provider. Make sure you discuss any questions you have with your health care provider.     Document Released: 03/26/2002 Document Revised: 01/08/2016 Document Reviewed: 07/03/2014  [employer] Interactive Patient Education ©2016 [employer]    CALL YOUR PHYSICIAN IF YOU EXPERIENCE  INCREASED PAIN NOT HELPED BY YOUR PAIN MEDICATION.    .                                               Fall Prevention in the Home      Falls can cause injuries. They can happen to people of all ages. There are many things you can do to make your home safe and to help prevent falls.    WHAT CAN I DO ON THE OUTSIDE OF MY HOME?  · Regularly fix the edges of walkways and driveways and fix any cracks.  · Remove anything that might make you trip as you walk through a door, such as a raised step or threshold.  · Trim any bushes or trees on the path to your home.  · Use bright outdoor lighting.  · Clear any walking paths of anything that might make someone trip, such as rocks or tools.  · Regularly check to see if handrails are loose or broken. Make sure that both sides of any steps have handrails.  · Any raised decks and porches should have guardrails on the edges.  · Have any leaves, snow, or ice cleared regularly.  · Use sand or salt on walking paths during winter.  · Clean up any spills in your garage right away. This includes oil or grease spills.  WHAT CAN I DO IN THE BATHROOM?    · Use night lights.  · Install grab bars by the toilet and in the tub and shower. Do not use towel bars as grab bars.  · Use non-skid mats or decals in the tub or shower.  · If you need to sit down in the shower, use a plastic, non-slip stool.  · Keep the floor dry. Clean up any water that spills on the floor as soon as it happens.  · Remove soap buildup in the tub or shower regularly.  · Attach bath mats securely with double-sided non-slip rug tape.  · Do not have throw rugs and other things on the floor that can make you trip.  WHAT CAN I DO IN THE BEDROOM?  · Use night lights.  · Make sure that you have a light by your bed that is easy to reach.  · Do not use any sheets or blankets that are too big for your bed. They should not hang down onto the floor.  · Have a firm chair that has side arms. You can use this for support while you get dressed.  · Do not have throw rugs and other things on the floor  that can make you trip.  WHAT CAN I DO IN THE KITCHEN?  · Clean up any spills right away.  · Avoid walking on wet floors.  · Keep items that you use a lot in easy-to-reach places.  · If you need to reach something above you, use a strong step stool that has a grab bar.  · Keep electrical cords out of the way.  · Do not use floor polish or wax that makes floors slippery. If you must use wax, use non-skid floor wax.  · Do not have throw rugs and other things on the floor that can make you trip.  WHAT CAN I DO WITH MY STAIRS?  · Do not leave any items on the stairs.  · Make sure that there are handrails on both sides of the stairs and use them. Fix handrails that are broken or loose. Make sure that handrails are as long as the stairways.  · Check any carpeting to make sure that it is firmly attached to the stairs. Fix any carpet that is loose or worn.  · Avoid having throw rugs at the top or bottom of the stairs. If you do have throw rugs, attach them to the floor with carpet tape.  · Make sure that you have a light switch at the top of the stairs and the bottom of the stairs. If you do not have them, ask someone to add them for you.  WHAT ELSE CAN I DO TO HELP PREVENT FALLS?  · Wear shoes that:  ¨ Do not have high heels.  ¨ Have rubber bottoms.  ¨ Are comfortable and fit you well.  ¨ Are closed at the toe. Do not wear sandals.  · If you use a stepladder:  ¨ Make sure that it is fully opened. Do not climb a closed stepladder.  ¨ Make sure that both sides of the stepladder are locked into place.  ¨ Ask someone to hold it for you, if possible.  · Clearly paramjit and make sure that you can see:  ¨ Any grab bars or handrails.  ¨ First and last steps.  ¨ Where the edge of each step is.  · Use tools that help you move around (mobility aids) if they are needed. These include:  ¨ Canes.  ¨ Walkers.  ¨ Scooters.  ¨ Crutches.  · Turn on the lights when you go into a dark area. Replace any light bulbs as soon as they burn  out.  · Set up your furniture so you have a clear path. Avoid moving your furniture around.  · If any of your floors are uneven, fix them.  · If there are any pets around you, be aware of where they are.  · Review your medicines with your doctor. Some medicines can make you feel dizzy. This can increase your chance of falling.  Ask your doctor what other things that you can do to help prevent falls.     This information is not intended to replace advice given to you by your health care provider. Make sure you discuss any questions you have with your health care provider.     Document Released: 10/14/2010 Document Revised: 05/03/2016 Document Reviewed: 01/22/2016  [employer] Interactive Patient Education ©2016 [employer]     PATIENT/FAMILY/RESPONSIBLE PARTY VERBALIZES UNDERSTANDING OF ABOVE EDUCATION.  COPY OF PAIN SCALE GIVEN AND REVIEWED WITH VERBALIZED UNDERSTANDING.        [facility]surgery    Postoperative care following spine surgery  Dear Patient,  You have recently undergone spine surgery (L1 kyphoplasty) and are now ready to go home. These written instructions are intended to help you to recover quickly.  • If you have ANY QUESTIONS about your condition prior to discharge please ask Dr. Sandy. In particular, if you have concerns about going home discuss them now. We do not want you to go until you are completely comfortable leaving the hospital.   • If you have ANY QUESTIONS about your condition after you go home call your doctor. The number is [phone] which is answered 24 hours a day. During regular working hours a  will connect you to your doctor, one of his partners, or one of our nurses. At night or on weekends the answering service will connect you with the physician on call. DO NOT HESITATE to call. We want to help you with any problems.     Deep vein thrombosis/ pulmonary embolus  Some patients who undergo surgery develop blood clots in the veins of the legs. These clots  can cause pain or swelling in the legs, or may cause no obvious problem. They can break free from the legs and travel to the lungs causing shortness of breath and/or chest pain.you develop pain or swelling in your legs after surgery, call your doctor. If you develop breathing problems or chest pain after surgery, call 911.    Neurological Deficit  Neurological deficits are problems with brain function like speech difficulty, weakness, numbness, imbalance, etc. These deficits may be present before or after spine surgery. Prior to discharge your doctor will make sure that all treatment needed to help you recover from such deficits has been instituted. He will also make sure that these deficits are stable or improving. After you go home, if you think any of your spine problems are getting worse, not better, it may be a sign of bleeding, infection, or other problems. Call your doctor. He will order tests and prescribe treatment as needed.    Activity Restrictions  In general after surgery you will be on restricted activities for several weeks to several months depending on the nature of your operation. For six weeks you should avoid heavy lifting (over 8 lbs or roughly a gallon of milk), bending, or stooping. You may be released by Dr. Sandy earlier. You may return to driving when you feel comfortable enough that you can safely handle your vehicle AND you are not taking narcotic pain medications. This may be as early as 10 - 14 days, but could be longer as instructed by your neurosurgeon. After surgery you may gradually increase your activities, as you are able to tolerate. Walking is an excellent low impact exercise to begin after surgery. You should try to make regular walks of 15 to 30 minutes part of your postoperative recovery as you become able to do so. It typically requires a period of days to a few weeks to reach this level of activity and should be done in a gradual fashion. Your return to work will depend  on your job requirements. In general, you may return to light duty work as soon as you feel comfortable and are not taking routine narcotic pain medications.    Medication  It is important to take your medication EXACTLY as prescribed. Some patients are reluctant to take pain medication. It is perfectly fine to take pain medication for several weeks after surgery. We want to eliminate pain whenever possible. Many pain medications can cause nausea (sick to your stomach), constipation (inability to poop), or itching. Nausea may be minimized by taking the medication with food. Constipation can be relieved by taking stool softeners and/ or laxatives that you can purchase over the counter as needed.    It is important to realize that no pain after surgery is an unrealistic expectation.  Pain medication will never reduce your pain score to zero.  The goal of pain medicine is to reduce your pain to the point you can move, take care of yourself, and participate in therapy.  Make sure to work with your caregiver to determine what is an adequate level of pain control to promote healthy movement and then take your medication to reach this goal.      Please taper your pain medications to avoid long term addiction.  Week 1: Take your pain medication no more than ever 4 hours as needed for severe pain.  Week 2:  you should be off of all pain medication.     Wound Care  Your incision is held together with  dissolvable sutures that do not need to be removed.     Seventy-two hours after surgery it is OK to get the wound wet, so you can take a shower or bath.     You do not need to put any medication (like Neosporin or Vitamin E) on the wound. Scrubbing the wound should be avoided until the staples nondissolvable sutures come out.     Heating pads have the potential to cause very serious burns, especially in patients using narcotic pain medications (e.g. Oxycodone, Oxycontin, etc.) Do not use heating pads during your recovery.       No nicotine products, including second-hand smoke, gum or patches. Nicotine will delay healing and increase the likelihood of a surgical complication. For help quitting, call the Quitline: 1-467.157.6726     Potential wound problems include the following:  • Infection--If the wound becomes red, tender, swollen, or warm it may be infected. Infection is often accompanied by fever. If you think your wound might be infected you should call your doctor. Often you can send us a picture of the wound so we can better evaluate it.   • Drainage--Fluid should not drain from your wound. If it does, call your doctor. Colored fluid may indicate infection. Clear fluid may indicate leakage of spinal fluid.   • Dehiscence--If the wound does not heal properly it may open up along the staple line. This is called dehiscence. Call us immediately.   • Sutures--Occasionally, one of the buried threads (sutures) may work through the skin. If you think this has happened call your doctor.   • Swelling--Spinal fluid or blood may collect under the skin. This is usually harmless, but needs to be evaluated. Call your doctor.     How to contact your doctor  Dr. Sandy and his team did your surgery and, therefore, are likely to know more about your condition than any other physicians. We are immediately available to help you with any problems after surgery. Please call us for any concerns at the following numbers:  • Doctor’s office:  [phone] (answered 24 hours a day)   • [facility] : [phone] (alternative emergency number for on-call neurosurgeon)     Specific instructions related to your surgery  Diet: no restrictions, eat a heart healthy diet. If you are diabetic, tightly controlling your blood sugar over the next 2 weeks is crucial in controlling infection.     Activity: as tolerated, no heavy lifting or strenuous exercise for at least 2 weeks.    Brace/collar instructions: LSO brace    Please do not use  NSAIDs (Ibuprofen, Toradol, etc) for 4 weeks following spinal fusion, as this can prevent bone growth. Tylenol is acceptable as an over the counter pain management.     Follow up:   Follow up with Dr. Sandy in the neurosurgery clinic ([phone]) in 2 weeks. We will schedule this appointment for you.            Sincerely,        Cuba Sandy MD, PhD, MPH

## 2019-07-11 NOTE — ANESTHESIA PROCEDURE NOTES
Airway  Urgency: elective    Airway not difficult    General Information and Staff    Patient location during procedure: OR  CRNA: Freida Sarabia CRNA    Indications and Patient Condition  Indications for airway management: airway protection    Preoxygenated: yes  MILS maintained throughout  Mask difficulty assessment: 1 - vent by mask    Final Airway Details  Final airway type: endotracheal airway      Successful airway: ETT  Cuffed: yes   Successful intubation technique: direct laryngoscopy  Facilitating devices/methods: intubating stylet  Endotracheal tube insertion site: oral  Blade: Amanda  Blade size: 2  ETT size (mm): 7.0  Cormack-Lehane Classification: grade I - full view of glottis  Placement verified by: chest auscultation, capnometry and palpation of cuff   Cuff volume (mL): 7  Measured from: teeth  ETT to teeth (cm): 20  Number of attempts at approach: 1

## 2019-07-11 NOTE — ANESTHESIA PREPROCEDURE EVALUATION
Anesthesia Evaluation     Patient summary reviewed   no history of anesthetic complications:  NPO Solid Status: > 8 hours  NPO Liquid Status: > 8 hours           Airway   Mallampati: III  TM distance: >3 FB  Comment: Patient noncompliant with complete exam, only opened small amount. Do not believe this to be reflective of difficulty  Dental - normal exam     Pulmonary - negative pulmonary ROS   Cardiovascular     ECG reviewed  PT is on anticoagulation therapy    (+) hypertension, valvular problems/murmurs AS, dysrhythmias Atrial Fib, hyperlipidemia,       Neuro/Psych  (+) CVA, psychiatric history Depression, dementia,     GI/Hepatic/Renal/Endo    (+) obesity,   diabetes mellitus,     Musculoskeletal     (+) back pain,   Abdominal    Substance History      OB/GYN          Other   (+) arthritis                     Anesthesia Plan    ASA 3     general     intravenous induction   Anesthetic plan, all risks, benefits, and alternatives have been provided, discussed and informed consent has been obtained with: patient and spouse/significant other.

## 2019-07-11 NOTE — DISCHARGE SUMMARY
Date of Discharge:  7/11/2019    Discharge Diagnosis:   Patient Active Problem List   Diagnosis   • Essential hypertension   • Mixed hyperlipidemia   • Cerebrovascular accident (CVA) (CMS/[facility])   • PAF (paroxysmal atrial fibrillation) (CMS/[facility])   • Vascular dementia without behavioral disturbance   • Depression   • Dementia   • Cerebrovascular small vessel disease   • Nonrheumatic aortic valve stenosis Mild 2015 Echo   • Hypertensive left ventricular hypertrophy, without heart failure Mild to moderate 2015 Echo   • Lumbar compression fracture (CMS/[facility])   • Class 2 severe obesity due to excess calories with serious comorbidity and body mass index (BMI) of 35.0 to 35.9 in adult (CMS/[facility])   • Non-tobacco user   • Closed compression fracture of L1 lumbar vertebra (CMS/[facility])     1. Closed compression fracture of L1 lumbar vertebra with delayed healing, subsequent encounter        Presenting Problem/History of Present Illness  Closed compression fracture of L1 lumbar vertebra with delayed healing, subsequent encounter [S32.010G]   1. Closed compression fracture of L1 lumbar vertebra with delayed healing, subsequent encounter        Hospital Course  Rupa Hernandez is a 84 y.o. female with a significant comorbidity hypertension, hyperlipidemia, cerebrovascular accident, paroxysmal atrial fibrillation on blood thinners, and dementia. She presents with a known problem of L1 acute compression fracture. Physical exam findings of tenderness to palpation over T/L spine.  Her imaging shows acute to subacute fracture of L1 based on MRI without central compression.  Today we discussed the risk benefits and possible complications of continued conservative management versus kyphoplasty.    She underwent an uneventful kyphoplasty at L1.  This is performed in the main or.  She did well postoperatively.  With mild pain medication she was able to be discharged home.    Procedures Performed  Procedure(s):  KYPHOPLASTY, Lumbar 1        Consults:   Consults     No orders found from 6/12/2019 to 7/12/2019.          Pertinent Test Results:   Imaging Results (last 24 hours)     Procedure Component Value Units Date/Time    XR Spine Lumbar AP & Lateral [[id number]] Updated:  07/11/19 1336    FL C Arm During Surgery [[id number]] Updated:  07/11/19 1336        Lab Results (last 24 hours)     ** No results found for the last 24 hours. **          Condition on Discharge:  stable    Vital Signs  Temp:  [97.2 °F (36.2 °C)] 97.2 °F (36.2 °C)  Heart Rate:  [60-65] 60  Resp:  [16-18] 16  BP: (128)/(91) 128/91    Physical Exam:   Physical Exam   Constitutional: She is oriented to person, place, and time.   Eyes: EOM are normal. Pupils are equal, round, and reactive to light.   Neurological: She is oriented to person, place, and time.   Psychiatric: Her speech is normal.        Neurologic Exam     Mental Status   Oriented to person, place, and time.   Speech: speech is normal   Level of consciousness: alert  Early onset dementia     Cranial Nerves     CN III, IV, VI   Pupils are equal, round, and reactive to light.  Extraocular motions are normal.     CN V   Facial sensation intact.     CN VII   Facial expression full, symmetric.     Motor Exam   Right arm pronator drift: absent  Left arm pronator drift: absent    Strength   Right deltoid: 5/5  Left deltoid: 5/5  Right biceps: 5/5  Left biceps: 5/5  Right triceps: 5/5  Left triceps: 5/5  Right iliopsoas: 5/5  Left iliopsoas: 5/5  Right quadriceps: 5/5  Left quadriceps: 5/5  Right gastroc: 5/5  Left gastroc: 5/5    Sensory Exam   Light touch normal.       Discharge Disposition  [facility] Hospital (DC - External)    Discharge Medications     Discharge Medications      Continue These Medications      Instructions Start Date   acetaminophen 650 MG 8 hr tablet  Commonly known as:  TYLENOL   650 mg, Oral, Every 8 Hours PRN      amLODIPine 5 MG tablet  Commonly known as:  NORVASC   5 mg, Oral, Daily      AZO BLADDER  CONTROL/GO-LESS PO   2 capsules, Oral, Daily      AZO DINE 95 MG tablet  Generic drug:  phenazopyridine   95 mg, Oral, 3 Times Daily PRN      CALCIUM 600+D3 PLUS MINERALS 600-800 MG-UNIT tablet   1 tablet, Oral, Daily      cephalexin 500 MG capsule  Commonly known as:  KEFLEX   500 mg, Oral, 2 Times Daily      cetirizine 10 MG tablet  Commonly known as:  zyrTEC   10 mg, Oral, Daily      DEMEROL 25 MG/0.5ML solution injection  Generic drug:  meperidine   25 mg, Intravenous, Every 4 Hours PRN      diclofenac 1 % gel gel  Commonly known as:  VOLTAREN   4 g, Topical, Daily PRN      docusate sodium 250 MG capsule  Commonly known as:  COLACE   250 mg, Oral, Daily      fish oil 1000 MG capsule capsule   1,000 mg, Oral, Daily With Breakfast      ipratropium 0.03 % nasal spray  Commonly known as:  ATROVENT   2 sprays, Nasal, Every 12 Hours PRN      lisinopril-hydrochlorothiazide 20-12.5 MG per tablet  Commonly known as:  PRINZIDE,ZESTORETIC   1 tablet, Oral, Daily      MULTI-VITAMIN PO   1 tablet, Oral, Daily      NAMENDA XR 28 MG capsule sustained-release 24 hr extended release capsule  Generic drug:  memantine   28 mg, Oral, Daily      PARoxetine 20 MG tablet  Commonly known as:  PAXIL   20 mg, Oral, Daily      pravastatin 40 MG tablet  Commonly known as:  PRAVACHOL   40 mg, Oral, Nightly      rivaroxaban 20 MG tablet  Commonly known as:  XARELTO   20 mg, Oral, Daily      traMADol 50 MG tablet  Commonly known as:  ULTRAM   50 mg, Oral, Every 4 Hours PRN             Discharge Diet:  reg diet    Activity at Discharge:  no heavy lifting and brace for 2 days postoperatively.     Follow-up Appointments  No future appointments.      Test Results Pending at Discharge   Order Current Status    Tissue Pathology Exam Collected (07/11/19 8741)    Type & Screen In process           Marco Sandy MD  07/11/19  1:39 PM    Time: Discharge 20 min

## 2019-07-11 NOTE — ANESTHESIA POSTPROCEDURE EVALUATION
Patient: Rupa Hernandez    Procedure Summary     Date:  07/11/19 Room / Location:   [facility] OR  /  [facility] OR    Anesthesia Start:  1225 Anesthesia Stop:  1340    Procedure:  KYPHOPLASTY, Lumbar 1 (N/A Spine Lumbar) Diagnosis:       Closed compression fracture of L1 lumbar vertebra with delayed healing, subsequent encounter      (Closed compression fracture of L1 lumbar vertebra with delayed healing, subsequent encounter [S32.010G])    Surgeon:  Marco Sandy MD Provider:  Freida Sarabia CRNA    Anesthesia Type:  general ASA Status:  3          Anesthesia Type: general  Last vitals  BP   167/64 (07/11/19 1435)   Temp   98 °F (36.7 °C) (07/11/19 1435)   Pulse   64 (07/11/19 1435)   Resp   16 (07/11/19 1435)     SpO2   93 % (07/11/19 1435)     Post Anesthesia Care and Evaluation    Patient location during evaluation: PACU  Patient participation: complete - patient participated  Level of consciousness: awake and alert  Pain management: adequate  Airway patency: patent  Anesthetic complications: No anesthetic complications    Cardiovascular status: acceptable  Respiratory status: acceptable  Hydration status: acceptable    Comments: Blood pressure 167/64, pulse 64, temperature 98 °F (36.7 °C), temperature source Temporal, resp. rate 16, SpO2 93 %, not currently breastfeeding.    Pt discharged from PACU based on otilio score >8

## 2019-07-11 NOTE — OP NOTE
NEUROSURGERY OPERATIVE NOTE    Rupa Hernandez  OR Date: 7/11/2019    Pre-op Diagnosis:   Closed compression fracture of L1 lumbar vertebra with delayed healing, subsequent encounter [S32.010G]    Post-op Diagnosis:     Post-Op Diagnosis Codes:     * Closed compression fracture of L1 lumbar vertebra with delayed healing, subsequent encounter [S32.010G]          Surgeon(s):  Marco Sandy MD    Anesthesia: General    Staff:   Circulator: Tiburcio Paulino RN; Jenn Parkinson RN  Scrub Person: Sharda Coleman  Assistant: Alma Castrejon    Procedure(s):  KYPHOPLASTY, Lumbar 1    INDICATIONS: Rupa Hernandez is a 84 y.o. female who presents with osteoporosis and intractable back pain.  Radiographic imaging including MRI confirms subacute compression fracture with approximately 50% height loss. She has been unresponsive to nonoperative treatment modalities including bracing, rest, and analgesics.     Based on these findings, we have decided to perform KYPHON Balloon Kyphoplasty at L1.    The risks and benefits of the procedure were discussed. The patient accepted and understood all potential risks and complications including extravasation of cement, vascular injury, air or fat embolus, nerve root compression, hematoma, damage to the spinal cord, bowel or bladder incontinence, difficulty walking or weakness.  After considering options, the patient requested that we proceed with the procedure.    PROCEDURE: The patient was brought to the operating room suite and general anesthesia with endotracheal intubation was performed. The patient was positioned prone on the Tavo table. The back was prepped and draped. The AP and lateral fluoroscopy was positioned over the L1 pedicles.  Localization was by counting up from the sacrum and morphology of the compression fracture.  Care was taken to ensure the endplates were square and the spinous processes were midline.  A small nick in the skin was made at the approximate entry  point and the Jamshidi needle was advanced to the superior lateral quadrant of the pedicle.    A transpedicular approach to the vertebral body was utilized.  Once just past the pedicle and into the vertebral body advancement was halted.  Positioning was confirmed on the AP and lateral plane. The stylet was removed and a biopsy was performed.  A drill was then used to core out the vertebral body to approximately the anterior cortex.  AP and lateral images were taken to verify position and trajectory. The anterior cortex was probed with the guide pin to ensure no perforations in the anterior cortex. After completing the entry into the vertebral body, a 20 mm inflatable Medtronic bone tamp was inserted through the cannula and advanced under fluoroscopic guidance into the vertebral body near the anterior cortex. The radiopaque marker bands on the bone tamp were identified using lateral images. The above sequence of instrument placement was then repeated on the right side of the L1 vertebral body. Once both bone tamps were in position, they were inflated slowly.  Expansion of the bone tamps was done sequentially in increments of 0.25 to 0.5 mL of contrast, with careful attention being paid to the inflation pressures and balloon position. The inflation was monitored with AP and lateral imaging.     There was no breach of the lateral wall or anterior cortex of the vertebral body. Under fluoroscopic imaging, and the use of the bone void fillers, internal fixation was achieved through a low-pressure injection of bone cement. The cavity was filled with a total volume of 5 mL. Once the bone cement had hardened, the cannulas were then removed.    Post-procedure, all incisions were closed with Monocryl sutures. The patient was kept in the prone position for approximately 10 minutes post cement injection. The patient was then turned supine, monitored briefly and returned to the floor.  The patient was moving both her lower  extremities at this time.    Throughout the procedure, there were no intraoperative complications. Estimated blood loss was minimal.         Estimated Blood Loss: minimal    Implants:   Implant Name Type Inv. Item Serial No.  Lot No. LRB No. Used   KT MIXER KYPHON W/CMT BONE KYPHX HV R - [id number] Implant KT MIXER KYPHON W/CMT BONE KYPHX HV R  [employer] [id number] N/A 1       Specimens:                Specimens     ID Source Type Tests Collected By Collected At Frozen?      A Spine, Lumbar Bone · TISSUE PATHOLOGY EXAM   Marco Sandy MD 7/11/19 1253 No     Description: L1            Drains:

## 2019-07-12 ENCOUNTER — TELEPHONE (OUTPATIENT)
Dept: NEUROSURGERY | Facility: CLINIC | Age: 84
End: 2019-07-12

## 2019-07-12 DIAGNOSIS — S32.010G CLOSED COMPRESSION FRACTURE OF L1 LUMBAR VERTEBRA WITH DELAYED HEALING, SUBSEQUENT ENCOUNTER: Primary | ICD-10-CM

## 2019-07-12 RX ORDER — ONDANSETRON 4 MG/1
4 TABLET, FILM COATED ORAL EVERY 8 HOURS PRN
Qty: 15 TABLET | Refills: 0 | Status: SHIPPED | OUTPATIENT
Start: 2019-07-12 | End: 2019-07-22

## 2019-07-12 NOTE — TELEPHONE ENCOUNTER
"Patient's spouse calls stating that patient is in severe pain in her low back and hips. Patient has not had any pain medications at this point due to GI intolerance and \"throwing it up\". I have advised  we can send Rx for Zofran to local pharmacy and she can take this with her pain medications to see if this helps with her GI intolerance. By my understanding she is having normal, post operative pain, she just underwent surgery less than 24 hours ago.  is agreeable and Rx has been sent.   "

## 2019-07-15 LAB
ABO + RH BLD: NORMAL
ABO + RH BLD: NORMAL
BH BB BLOOD EXPIRATION DATE: NORMAL
BH BB BLOOD EXPIRATION DATE: NORMAL
BH BB BLOOD TYPE BARCODE: 5100
BH BB BLOOD TYPE BARCODE: 5100
BH BB DISPENSE STATUS: NORMAL
BH BB DISPENSE STATUS: NORMAL
BH BB PRODUCT CODE: NORMAL
BH BB PRODUCT CODE: NORMAL
BH BB UNIT NUMBER: NORMAL
BH BB UNIT NUMBER: NORMAL
CROSSMATCH INTERPRETATION: NORMAL
CROSSMATCH INTERPRETATION: NORMAL
CYTO UR: NORMAL
LAB AP CASE REPORT: NORMAL
PATH REPORT.FINAL DX SPEC: NORMAL
PATH REPORT.GROSS SPEC: NORMAL
UNIT  ABO: NORMAL
UNIT  ABO: NORMAL
UNIT  RH: NORMAL
UNIT  RH: NORMAL

## 2019-07-17 DIAGNOSIS — S32.010G CLOSED COMPRESSION FRACTURE OF L1 LUMBAR VERTEBRA WITH DELAYED HEALING, SUBSEQUENT ENCOUNTER: Primary | ICD-10-CM

## 2019-07-17 RX ORDER — MEPERIDINE HYDROCHLORIDE 50 MG/1
50 TABLET ORAL EVERY 4 HOURS PRN
Qty: 22 TABLET | Refills: 0 | Status: SHIPPED | OUTPATIENT
Start: 2019-07-17 | End: 2022-10-20

## 2019-07-17 RX ORDER — MEPERIDINE HYDROCHLORIDE 50 MG/1
50 TABLET ORAL EVERY 4 HOURS PRN
Qty: 22 TABLET | Refills: 0 | Status: SHIPPED | OUTPATIENT
Start: 2019-07-17 | End: 2019-07-17 | Stop reason: SDUPTHER

## 2019-07-17 NOTE — TELEPHONE ENCOUNTER
Per Dr. Sandy, last refill. If patient should continue to have pain they should discuss medications with PCP or consider referral to pain management. I have spoke with spouse, he is agreeable.

## 2019-07-19 NOTE — PROGRESS NOTES
Chief complaint:   Chief Complaint   Patient presents with   • Follow-up     Patient is her today for a follow up from kyphoplasty on 7/11/19.      Subjective     HPI:   Interval History: Rupa Hernandez is a 84 y.o.  female who presents today the her  for post operative follow-up from a KYPHOPLASTY, Lumbar 1 on 7/11/2019 per Dr. Sandy.  According to Mr. Hernandez, Rupa continues to complain of rather constant lower back discomfort.  She denies radicular pain or numbness or paresthesias to the lower extremities.  No falls since discharge.  Noncompliant with bracing.  Scheduled to begin physical therapy with home health.  Objectively, Ms. Hernandez does not appear to be in any distress or pain, however she rates the severity of her symptoms 8/10.    PFSH:  Past Medical History:   Diagnosis Date   • Arthritis    • Dementia    • Dementia    • Depression    • Diabetes mellitus (CMS/HCC)    • Hyperlipidemia    • Hypertension    • Obesity (BMI 30.0-34.9)    • PONV (postoperative nausea and vomiting)    • Rectal polyp    • S/P kyphoplasty    • Stroke (CMS/HCC)    • UTI (urinary tract infection)      Past Surgical History:   Procedure Laterality Date   • APPENDECTOMY     • CATARACT EXTRACTION     • CATARACT EXTRACTION W/ INTRAOCULAR LENS  IMPLANT, BILATERAL     • COLONOSCOPY  07/14/2014    diverticulosis   • DILATION AND CURETTAGE, DIAGNOSTIC / THERAPEUTIC     • HYSTERECTOMY     • JOINT REPLACEMENT      KNEES   • KNEE SURGERY Bilateral     X2   • KYPHOPLASTY N/A 7/11/2019    Procedure: KYPHOPLASTY, Lumbar 1;  Surgeon: Marco Sandy MD;  Location: [facility];  Service: Neurosurgery   • OOPHORECTOMY     • ROTATOR CUFF REPAIR Right      Objective      Current Outpatient Medications   Medication Sig Dispense Refill   • acetaminophen (TYLENOL) 650 MG 8 hr tablet Take 650 mg by mouth Every 8 (Eight) Hours As Needed for Mild Pain .     • amLODIPine (NORVASC) 5 MG tablet Take 5 mg by mouth Daily.     • Calcium  Carbonate-Vit D-Min (CALCIUM 600+D3 PLUS MINERALS) 600-800 MG-UNIT tablet Take 1 tablet by mouth Daily.     • cetirizine (ZyrTEC) 10 MG tablet Take 10 mg by mouth Daily.     • CONTOUR NEXT TEST test strip TEST DAILY AS DIRECTED  5   • diclofenac (VOLTAREN) 1 % gel gel Apply 4 g topically to the appropriate area as directed Daily As Needed (Knee pain).     • docusate sodium (COLACE) 250 MG capsule Take 250 mg by mouth Daily.     • ipratropium (ATROVENT) 0.03 % nasal spray 2 sprays into the nostril(s) as directed by provider Every 12 (Twelve) Hours As Needed for Rhinitis.     • lisinopril-hydrochlorothiazide (PRINZIDE,ZESTORETIC) 20-12.5 MG per tablet Take 1 tablet by mouth Daily.     • memantine (NAMENDA XR) 28 MG capsule sustained-release 24 hr extended release capsule Take 28 mg by mouth Daily.     • meperidine (DEMEROL) 50 MG tablet Take 1 tablet by mouth Every 4 (Four) Hours As Needed for Moderate Pain . 22 tablet 0   • Multiple Vitamin (MULTI-VITAMIN PO) Take 1 tablet by mouth Daily.     • Omega-3 Fatty Acids (FISH OIL) 1000 MG capsule capsule Take 1,000 mg by mouth Daily With Breakfast.     • pantoprazole (PROTONIX) 40 MG EC tablet TK 1 T PO BID QAM  3   • PARoxetine (PAXIL) 20 MG tablet Take 20 mg by mouth Daily.     • pravastatin (PRAVACHOL) 40 MG tablet Take 40 mg by mouth Every Night.     • Pumpkin Seed-Soy Germ (AZO BLADDER CONTROL/GO-LESS PO) Take 2 capsules by mouth Daily.     • raNITIdine (ZANTAC) 150 MG tablet Take 150 mg by mouth 2 (Two) Times a Day.  3   • rivaroxaban (XARELTO) 20 MG tablet Take 1 tablet by mouth Daily. 90 tablet 3   • traMADol (ULTRAM) 50 MG tablet Take 50 mg by mouth Every 4 (Four) Hours As Needed for Moderate Pain .     • traMADol (ULTRAM) 50 MG tablet Take 1 tablet by mouth Every 6 (Six) Hours As Needed for Moderate Pain . 22 tablet 0   • TRUEPLUS LANCETS 33G misc Daily. for testing as directed  5   • cephalexin (KEFLEX) 500 MG capsule Take 500 mg by mouth 2 (Two) Times a Day.    "  • phenazopyridine (AZO DINE) 95 MG tablet Take 95 mg by mouth 3 (Three) Times a Day As Needed for bladder spasms.       No current facility-administered medications for this visit.      Vital Signs  Ht 153 cm (60.24\")   Wt 81.2 kg (179 lb)   BMI 34.68 kg/m²   Physical Exam   Constitutional: She appears well-developed and well-nourished.  Non-toxic appearance. She does not have a sickly appearance. She does not appear ill. No distress.   BMI 34.7   HENT:   Head: Normocephalic and atraumatic.   Right Ear: Hearing normal.   Left Ear: Hearing normal.   Mouth/Throat: Mucous membranes are normal.   Eyes: Conjunctivae and EOM are normal. Pupils are equal, round, and reactive to light.   Neck: Trachea normal and full passive range of motion without pain. Neck supple.   Cardiovascular: Normal rate and regular rhythm.   Pulmonary/Chest: Effort normal. No accessory muscle usage. No apnea, no tachypnea and no bradypnea. No respiratory distress.   Abdominal: Soft. Normal appearance.   Musculoskeletal:   Paraspinal tenderness below the area of kyphoplasty.   Neurological: She is alert. Gait normal.   Reflex Scores:       Tricep reflexes are 1+ on the right side and 1+ on the left side.       Bicep reflexes are 1+ on the right side and 1+ on the left side.       Brachioradialis reflexes are 1+ on the right side and 1+ on the left side.       Patellar reflexes are 1+ on the right side and 1+ on the left side.       Achilles reflexes are 1+ on the right side and 1+ on the left side.  Skin: Skin is warm, dry and intact.   Psychiatric: She has a normal mood and affect. Her speech is normal and behavior is normal.   Nursing note and vitals reviewed.    Neurologic Exam     Mental Status   Oriented to person.   Oriented to place.   Disoriented to day.   Attention: normal. Concentration: normal.   Speech: speech is normal   Level of consciousness: alert  Unable to name object.     Oriented x2 to person and place only.  Follows simple " commands.  Only able to name 1 of 3 objects.     Cranial Nerves     CN II   Visual fields full to confrontation.     CN III, IV, VI   Pupils are equal, round, and reactive to light.  Extraocular motions are normal.     CN V   Facial sensation intact.     CN VII   Facial expression full, symmetric.     CN VIII   CN VIII normal.     CN IX, X   CN IX normal.     CN XI   CN XI normal.     Motor Exam   Muscle bulk: normal  Overall muscle tone: normal  Right arm tone: normal  Left arm tone: normal  Right arm pronator drift: absent  Left arm pronator drift: absent  Right leg tone: normal  Left leg tone: normal    Strength   Right deltoid: 5/5  Left deltoid: 5/5  Right biceps: 5/5  Left biceps: 5/5  Right triceps: 5/5  Left triceps: 5/5  Right wrist extension: 5/5  Left wrist extension: 5/5  Right iliopsoas: 5/5  Left iliopsoas: 5/5  Right quadriceps: 5/5  Left quadriceps: 5/5  Right anterior tibial: 5/5  Left anterior tibial: 5/5  Right posterior tibial: 5/5  Left posterior tibial: 5/5    Sensory Exam   Light touch normal.     Gait, Coordination, and Reflexes     Gait  Gait: normal    Tremor   Resting tremor: absent  Intention tremor: absent  Action tremor: absent    Reflexes   Right brachioradialis: 1+  Left brachioradialis: 1+  Right biceps: 1+  Left biceps: 1+  Right triceps: 1+  Left triceps: 1+  Right patellar: 1+  Left patellar: 1+  Right achilles: 1+  Left achilles: 1+  Right : 3+  Left : 3+  Right plantar: normal  Left plantar: normal  Right Calderon: absent  Left Calderon: absent  Right ankle clonus: absent  Left ankle clonus: absent  Right pendular knee jerk: absent  Left pendular knee jerk: absent    Incision: Clean, dry, intact.    Results Review: no new imaging      Assessment/Plan:   1. S/P kyphoplasty    2. Lumbar degenerative disc disease    3. Obesity (BMI 30.0-34.9)    4. Non-tobacco user      Ms. Hernandez presents today for post operative wound check.  Objectively, Ms. Hernandez does not appear to be in  any pain or distress, however Mr. Hernandez states she complains of rather constant back discomfort.   She does have paraspinal tenderness below the area of kyphoplasty.  We discussed chronic degenerative changes as a potential possibility for continued back discomfort.  Her post operative incision is clean, dry, and intact.  No wound drainage or discharge noted.  No signs of soft tissue infection.  May continue current pain medications with tapering dosages as previously instructed.  B/R/AE discussed.  I recommended that Ms. Hernandez continue to wear back brace as previously instructed and participate with physical therapy per their instructions and directions.  Keep scheduled appointment with Dr. Sandy for reassessment.  Call to return sooner for any additional concerns.      Rupa was seen today for follow-up.    Diagnoses and all orders for this visit:    S/P kyphoplasty    Lumbar degenerative disc disease    Obesity (BMI 30.0-34.9)    Non-tobacco user      Return for Keep [facility] apt with Dr. Sandy.    I discussed the patients findings and my recommendations with patient    SHIV Lama

## 2019-07-25 ENCOUNTER — OFFICE VISIT (OUTPATIENT)
Dept: NEUROSURGERY | Facility: CLINIC | Age: 84
End: 2019-07-25

## 2019-07-25 VITALS
HEIGHT: 60 IN | BODY MASS INDEX: 35.14 KG/M2 | SYSTOLIC BLOOD PRESSURE: 120 MMHG | DIASTOLIC BLOOD PRESSURE: 60 MMHG | WEIGHT: 179 LBS

## 2019-07-25 DIAGNOSIS — Z98.890 S/P KYPHOPLASTY: Primary | ICD-10-CM

## 2019-07-25 DIAGNOSIS — E66.9 OBESITY (BMI 30.0-34.9): ICD-10-CM

## 2019-07-25 DIAGNOSIS — Z78.9 NON-TOBACCO USER: ICD-10-CM

## 2019-07-25 DIAGNOSIS — M51.36 LUMBAR DEGENERATIVE DISC DISEASE: ICD-10-CM

## 2019-07-25 PROBLEM — E66.811 OBESITY (BMI 30.0-34.9): Status: ACTIVE | Noted: 2019-07-25

## 2019-07-25 PROCEDURE — 99213 OFFICE O/P EST LOW 20 MIN: CPT | Performed by: NURSE PRACTITIONER

## 2019-07-25 RX ORDER — PANTOPRAZOLE SODIUM 40 MG/1
40 TABLET, DELAYED RELEASE ORAL DAILY
Refills: 3 | COMMUNITY
Start: 2019-07-10

## 2019-07-25 RX ORDER — RANITIDINE 150 MG/1
150 TABLET ORAL 2 TIMES DAILY
Refills: 3 | COMMUNITY
Start: 2019-07-10 | End: 2022-10-20

## 2019-07-25 RX ORDER — GLUCOSAM/CHON-MSM1/C/MANG/BOSW 500-416.6
TABLET ORAL DAILY
Refills: 5 | Status: ON HOLD | COMMUNITY
Start: 2019-07-19 | End: 2022-10-28

## 2019-07-25 RX ORDER — PERPHENAZINE 16 MG/1
TABLET, FILM COATED ORAL
Refills: 5 | Status: ON HOLD | COMMUNITY
Start: 2019-07-19 | End: 2022-10-28

## 2019-07-25 NOTE — PATIENT INSTRUCTIONS
"DASH Eating Plan  DASH stands for \"Dietary Approaches to Stop Hypertension.\" The DASH eating plan is a healthy eating plan that has been shown to reduce high blood pressure (hypertension). It may also reduce your risk for type 2 diabetes, heart disease, and stroke. The DASH eating plan may also help with weight loss.  What are tips for following this plan?  General guidelines  · Avoid eating more than 2,300 mg (milligrams) of salt (sodium) a day. If you have hypertension, you may need to reduce your sodium intake to 1,500 mg a day.  · Limit alcohol intake to no more than 1 drink a day for nonpregnant women and 2 drinks a day for men. One drink equals 12 oz of beer, 5 oz of wine, or 1½ oz of hard liquor.  · Work with your health care provider to maintain a healthy body weight or to lose weight. Ask what an ideal weight is for you.  · Get at least 30 minutes of exercise that causes your heart to beat faster (aerobic exercise) most days of the week. Activities may include walking, swimming, or biking.  · Work with your health care provider or diet and nutrition specialist (dietitian) to adjust your eating plan to your individual calorie needs.  Reading food labels  · Check food labels for the amount of sodium per serving. Choose foods with less than 5 percent of the Daily Value of sodium. Generally, foods with less than 300 mg of sodium per serving fit into this eating plan.  · To find whole grains, look for the word \"whole\" as the first word in the ingredient list.  Shopping  · Buy products labeled as \"low-sodium\" or \"no salt added.\"  · Buy fresh foods. Avoid canned foods and premade or frozen meals.  Cooking  · Avoid adding salt when cooking. Use salt-free seasonings or herbs instead of table salt or sea salt. Check with your health care provider or pharmacist before using salt substitutes.  · Do not markham foods. Cook foods using healthy methods such as baking, boiling, grilling, and broiling instead.  · Cook with " heart-healthy oils, such as olive, canola, soybean, or sunflower oil.  Meal planning    · Eat a balanced diet that includes:  ? 5 or more servings of fruits and vegetables each day. At each meal, try to fill half of your plate with fruits and vegetables.  ? Up to 6-8 servings of whole grains each day.  ? Less than 6 oz of lean meat, poultry, or fish each day. A 3-oz serving of meat is about the same size as a deck of cards. One egg equals 1 oz.  ? 2 servings of low-fat dairy each day.  ? A serving of nuts, seeds, or beans 5 times each week.  ? Heart-healthy fats. Healthy fats called Omega-3 fatty acids are found in foods such as flaxseeds and coldwater fish, like sardines, salmon, and mackerel.  · Limit how much you eat of the following:  ? Canned or prepackaged foods.  ? Food that is high in trans fat, such as fried foods.  ? Food that is high in saturated fat, such as fatty meat.  ? Sweets, desserts, sugary drinks, and other foods with added sugar.  ? Full-fat dairy products.  · Do not salt foods before eating.  · Try to eat at least 2 vegetarian meals each week.  · Eat more home-cooked food and less restaurant, buffet, and fast food.  · When eating at a restaurant, ask that your food be prepared with less salt or no salt, if possible.  What foods are recommended?  The items listed may not be a complete list. Talk with your dietitian about what dietary choices are best for you.  Grains  Whole-grain or whole-wheat bread. Whole-grain or whole-wheat pasta. Brown rice. Oatmeal. Quinoa. Bulgur. Whole-grain and low-sodium cereals. Ruby bread. Low-fat, low-sodium crackers. Whole-wheat flour tortillas.  Vegetables  Fresh or frozen vegetables (raw, steamed, roasted, or grilled). Low-sodium or reduced-sodium tomato and vegetable juice. Low-sodium or reduced-sodium tomato sauce and tomato paste. Low-sodium or reduced-sodium canned vegetables.  Fruits  All fresh, dried, or frozen fruit. Canned fruit in natural juice (without  added sugar).  Meat and other protein foods  Skinless chicken or turkey. Ground chicken or turkey. Pork with fat trimmed off. Fish and seafood. Egg whites. Dried beans, peas, or lentils. Unsalted nuts, nut butters, and seeds. Unsalted canned beans. Lean cuts of beef with fat trimmed off. Low-sodium, lean deli meat.  Dairy  Low-fat (1%) or fat-free (skim) milk. Fat-free, low-fat, or reduced-fat cheeses. Nonfat, low-sodium ricotta or cottage cheese. Low-fat or nonfat yogurt. Low-fat, low-sodium cheese.  Fats and oils  Soft margarine without trans fats. Vegetable oil. Low-fat, reduced-fat, or light mayonnaise and salad dressings (reduced-sodium). Canola, safflower, olive, soybean, and sunflower oils. Avocado.  Seasoning and other foods  Herbs. Spices. Seasoning mixes without salt. Unsalted popcorn and pretzels. Fat-free sweets.  What foods are not recommended?  The items listed may not be a complete list. Talk with your dietitian about what dietary choices are best for you.  Grains  Baked goods made with fat, such as croissants, muffins, or some breads. Dry pasta or rice meal packs.  Vegetables  Creamed or fried vegetables. Vegetables in a cheese sauce. Regular canned vegetables (not low-sodium or reduced-sodium). Regular canned tomato sauce and paste (not low-sodium or reduced-sodium). Regular tomato and vegetable juice (not low-sodium or reduced-sodium). Pickles. Olives.  Fruits  Canned fruit in a light or heavy syrup. Fried fruit. Fruit in cream or butter sauce.  Meat and other protein foods  Fatty cuts of meat. Ribs. Fried meat. Esquivel. Sausage. Bologna and other processed lunch meats. Salami. Fatback. Hotdogs. Bratwurst. Salted nuts and seeds. Canned beans with added salt. Canned or smoked fish. Whole eggs or egg yolks. Chicken or turkey with skin.  Dairy  Whole or 2% milk, cream, and half-and-half. Whole or full-fat cream cheese. Whole-fat or sweetened yogurt. Full-fat cheese. Nondairy creamers. Whipped toppings.  Processed cheese and cheese spreads.  Fats and oils  Butter. Stick margarine. Lard. Shortening. Ghee. Esquivel fat. Tropical oils, such as coconut, palm kernel, or palm oil.  Seasoning and other foods  Salted popcorn and pretzels. Onion salt, garlic salt, seasoned salt, table salt, and sea salt. Worcestershire sauce. Tartar sauce. Barbecue sauce. Teriyaki sauce. Soy sauce, including reduced-sodium. Steak sauce. Canned and packaged gravies. Fish sauce. Oyster sauce. Cocktail sauce. Horseradish that you find on the shelf. Ketchup. Mustard. Meat flavorings and tenderizers. Bouillon cubes. Hot sauce and Tabasco sauce. Premade or packaged marinades. Premade or packaged taco seasonings. Relishes. Regular salad dressings.  Where to find more information:  · National Heart, Lung, and Blood [place]: www.nhlbi.nih.gov  · American Heart Association: www.heart.org  Summary  · The DASH eating plan is a healthy eating plan that has been shown to reduce high blood pressure (hypertension). It may also reduce your risk for type 2 diabetes, heart disease, and stroke.  · With the DASH eating plan, you should limit salt (sodium) intake to 2,300 mg a day. If you have hypertension, you may need to reduce your sodium intake to 1,500 mg a day.  · When on the DASH eating plan, aim to eat more fresh fruits and vegetables, whole grains, lean proteins, low-fat dairy, and heart-healthy fats.  · Work with your health care provider or diet and nutrition specialist (dietitian) to adjust your eating plan to your individual calorie needs.  This information is not intended to replace advice given to you by your health care provider. Make sure you discuss any questions you have with your health care provider.  Document Released: 12/06/2012 Document Revised: 12/11/2017 Document Reviewed: 12/11/2017  [employer] Interactive Patient Education © 2019 [employer]

## 2019-09-18 ENCOUNTER — OFFICE VISIT (OUTPATIENT)
Dept: NEUROSURGERY | Facility: CLINIC | Age: 84
End: 2019-09-18

## 2019-09-18 DIAGNOSIS — Z78.9 NON-TOBACCO USER: ICD-10-CM

## 2019-09-18 DIAGNOSIS — S32.010D CLOSED COMPRESSION FRACTURE OF L1 LUMBAR VERTEBRA WITH ROUTINE HEALING, SUBSEQUENT ENCOUNTER: Primary | ICD-10-CM

## 2019-09-18 DIAGNOSIS — E66.09 CLASS 1 OBESITY DUE TO EXCESS CALORIES WITH SERIOUS COMORBIDITY AND BODY MASS INDEX (BMI) OF 34.0 TO 34.9 IN ADULT: ICD-10-CM

## 2019-09-18 DIAGNOSIS — Z98.890 S/P KYPHOPLASTY: ICD-10-CM

## 2019-09-18 PROCEDURE — 99214 OFFICE O/P EST MOD 30 MIN: CPT | Performed by: NEUROLOGICAL SURGERY
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
## 2020-08-06 ENCOUNTER — HOSPITAL ENCOUNTER (OUTPATIENT)
Dept: WOMENS IMAGING | Age: 85
Discharge: HOME | End: 2020-08-06
Payer: MEDICARE

## 2020-08-06 ENCOUNTER — OFFICE VISIT (OUTPATIENT)
Dept: SURGERY | Age: 85
End: 2020-08-06
Payer: MEDICARE

## 2020-08-06 VITALS
HEART RATE: 62 BPM | SYSTOLIC BLOOD PRESSURE: 136 MMHG | HEIGHT: 63 IN | OXYGEN SATURATION: 92 % | WEIGHT: 181 LBS | BODY MASS INDEX: 32.07 KG/M2 | DIASTOLIC BLOOD PRESSURE: 68 MMHG

## 2020-08-06 PROCEDURE — 1123F ACP DISCUSS/DSCN MKR DOCD: CPT | Performed by: PHYSICIAN ASSISTANT

## 2020-08-06 PROCEDURE — 99213 OFFICE O/P EST LOW 20 MIN: CPT | Performed by: PHYSICIAN ASSISTANT

## 2020-08-06 PROCEDURE — 1090F PRES/ABSN URINE INCON ASSESS: CPT | Performed by: PHYSICIAN ASSISTANT

## 2020-08-06 PROCEDURE — 77063 BREAST TOMOSYNTHESIS BI: CPT

## 2020-08-06 PROCEDURE — 1036F TOBACCO NON-USER: CPT | Performed by: PHYSICIAN ASSISTANT

## 2020-08-06 PROCEDURE — G8417 CALC BMI ABV UP PARAM F/U: HCPCS | Performed by: PHYSICIAN ASSISTANT

## 2020-08-06 PROCEDURE — G8400 PT W/DXA NO RESULTS DOC: HCPCS | Performed by: PHYSICIAN ASSISTANT

## 2020-08-06 PROCEDURE — G8427 DOCREV CUR MEDS BY ELIG CLIN: HCPCS | Performed by: PHYSICIAN ASSISTANT

## 2020-08-06 PROCEDURE — 4040F PNEUMOC VAC/ADMIN/RCVD: CPT | Performed by: PHYSICIAN ASSISTANT

## 2020-08-06 NOTE — PROGRESS NOTES
HPI:  Ute Rodríguez is in for yearly follow-up breast check. She has not noticed any changes in her breasts. EXAMINATION: [facility] DIGITAL SCREEN W OR WO CAD BILATERAL 8/6/2020 3:01 PM    HISTORY: SCREENING BILATERAL DIGITAL MAMMOGRAM WITH TOMOSYNTHESIS    8/6/2020 12:56 PM    CLINICAL HISTORY: Screening.      COMPARISON STUDIES: March 18, 2019 March 6, 2018 February 13, 2017 February 12, 2016. FINDINGS:    Digital CC and MLO views of bilateral breasts were obtained. Tomosynthesis in the MLO and CC projections was also performed. There are scattered fibroglandular densities (approximately 25-50%    glandular tissue) consistent with a type B parenchymal pattern. No    suspicious masses or calcifications are identified. There is no architectural distortion. A biopsy clip is present in the    right breast. Cardiac loop monitor projects over the left breast     This study was interpreted with CAD. IMPRESSION AND RECOMMENDATION:    No mammographic evidence of malignancy. Recommendation is for the    patient to return for routine mammography in one year or sooner, if    clinically indicated.     Assessment: BI-RADS Category 2 benign       BREAST EXAM:  On examination, she has fibrocystic changes throughout both breasts, no dominant masses, no skin or nipple changes and no axillary adenopathy. I see nothing suspicious for breast cancer. ASSESSMENT:  Benign fibrocystic changes              PLAN:  I will plan to see her back in 1 year for physical exam and mammograms. She will contact me if anything significant changes. 15 minutes spent, which includes face to face with patient, record review, evaluation, planning, and education. I spent over 50% of this visit counseling patient.

## 2021-09-20 ENCOUNTER — HOSPITAL ENCOUNTER (OUTPATIENT)
Dept: WOMENS IMAGING | Age: 86
Discharge: HOME | End: 2021-09-20
Payer: MEDICARE

## 2021-09-20 ENCOUNTER — OFFICE VISIT (OUTPATIENT)
Dept: SURGERY | Age: 86
End: 2021-09-20
Payer: MEDICARE

## 2021-09-20 VITALS
BODY MASS INDEX: 34.16 KG/M2 | SYSTOLIC BLOOD PRESSURE: 124 MMHG | DIASTOLIC BLOOD PRESSURE: 70 MMHG | HEIGHT: 60 IN | WEIGHT: 174 LBS | TEMPERATURE: 97.4 F

## 2021-09-20 DIAGNOSIS — Z12.31 VISIT FOR SCREENING MAMMOGRAM: ICD-10-CM

## 2021-09-20 DIAGNOSIS — N60.19 FIBROCYSTIC BREAST DISEASE (FCBD), UNSPECIFIED LATERALITY: ICD-10-CM

## 2021-09-20 DIAGNOSIS — Z12.31 VISIT FOR SCREENING MAMMOGRAM: Primary | ICD-10-CM

## 2021-09-20 PROCEDURE — G8400 PT W/DXA NO RESULTS DOC: HCPCS | Performed by: PHYSICIAN ASSISTANT

## 2021-09-20 PROCEDURE — G8419 CALC BMI OUT NRM PARAM NOF/U: HCPCS | Performed by: PHYSICIAN ASSISTANT

## 2021-09-20 PROCEDURE — 77067 SCR MAMMO BI INCL CAD: CPT

## 2021-09-20 PROCEDURE — 1036F TOBACCO NON-USER: CPT | Performed by: PHYSICIAN ASSISTANT

## 2021-09-20 PROCEDURE — 1090F PRES/ABSN URINE INCON ASSESS: CPT | Performed by: PHYSICIAN ASSISTANT

## 2021-09-20 PROCEDURE — 1123F ACP DISCUSS/DSCN MKR DOCD: CPT | Performed by: PHYSICIAN ASSISTANT

## 2021-09-20 PROCEDURE — 4040F PNEUMOC VAC/ADMIN/RCVD: CPT | Performed by: PHYSICIAN ASSISTANT

## 2021-09-20 PROCEDURE — G8427 DOCREV CUR MEDS BY ELIG CLIN: HCPCS | Performed by: PHYSICIAN ASSISTANT

## 2021-09-20 PROCEDURE — 99212 OFFICE O/P EST SF 10 MIN: CPT | Performed by: PHYSICIAN ASSISTANT

## 2021-09-21 NOTE — PROGRESS NOTES
Yuniel Clancy today for her follow-up breast exam.  She has had no new breast complaints. She reports no new palpable masses. There is no skin or nipple changes. There is no nipple discharge. She has no appreciable evidence of supraclavicular or axillary adenopathy. 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    S/P breast biopsy 02/21/2013    Diffuse cystic mastopathy 02/21/2013       Current Outpatient Medications   Medication Sig Dispense Refill    VITAMIN D PO Take by mouth      ZINC PO Take by mouth      diclofenac sodium 1 % GEL Apply 2 g topically 2 times daily      memantine ER (NAMENDA XR) 28 MG CP24 extended release capsule Take 28 mg by mouth daily      lisinopril-hydrochlorothiazide (PRINZIDE;ZESTORETIC) 20-12.5 MG per tablet Take 1 tablet by mouth daily      Omega-3 Fatty Acids (FISH OIL) 1000 MG CAPS Take 3,000 mg by mouth daily      Multiple Vitamins-Minerals (THERAPEUTIC MULTIVITAMIN-MINERALS) tablet Take 1 tablet by mouth daily      rivaroxaban (XARELTO) 20 MG TABS tablet Take 20 mg by mouth daily      cetirizine (ZYRTEC) 10 MG tablet Take 10 mg by mouth daily      AZO CRANBERRY GUMMIES PO Take 25,000 mg by mouth daily      docusate sodium (COLACE) 250 MG capsule Take 250 mg by mouth daily      ipratropium (ATROVENT) 0.03 % nasal spray 2 sprays by Nasal route every 12 hours      Calcium Carbonate Antacid (ANTACID CALCIUM PO) Take by mouth as needed      PARoxetine (PAXIL) 10 MG tablet 20 mg every morning       ALPRAZolam (XANAX) 0.25 MG tablet       donepezil (ARICEPT) 5 MG tablet       pravastatin (PRAVACHOL) 40 MG tablet       amlodipine (NORVASC) 5 MG tablet Take 5 mg by mouth daily.  aspirin 81 MG EC tablet Take 81 mg by mouth daily.  calcium carbonate 600 MG TABS tablet Take 1 tablet by mouth daily. No current facility-administered medications for this visit.        Allergies: Morphine sulfate, Adhesive tape, Gabapentin, Hydrocodone-acetaminophen, Lipitor [atorvastatin], Lyrica [pregabalin], Oxycodone-acetaminophen, Codeine, and Percocet [oxycodone-acetaminophen]    Past Medical History:   Diagnosis Date    Arthritis     Atrial fibrillation ([facility] [address]     Depression     Hyperlipidemia     Hypertension     Osteoarthritis     Osteoporosis     Stroke ([facility] [address] 01/2016    Wears glasses        Past Surgical History:   Procedure Laterality Date    APPENDECTOMY      BREAST BIOPSY      Right-fibrocyst removed    CATARACT REMOVAL      DILATION AND CURETTAGE OF UTERUS      x 2    EYE SURGERY      cataracts    HYSTERECTOMY, VAGINAL      INSERTABLE CARDIAC MONITOR      JOINT REPLACEMENT      left and right    KNEE SURGERY      L replacement    ROTATOR CUFF REPAIR Right 2015    TOTAL KNEE ARTHROPLASTY      right       Family History   Problem Relation Age of Onset    Colon Cancer Paternal Grandmother     High Blood Pressure Mother        Social History     Tobacco Use    Smoking status: Never Smoker    Smokeless tobacco: Never Used   Substance Use Topics    Alcohol use: No      Mammogram  No mammographic evidence of malignancy.  Recommend routine annual   screening mammography. A result letter will be sent to the patient.     BI-RADS CATEGORY 1: NEGATIVE. Management Recommendation: Routine annual screening mammography. ROS  review of system reviewed and positive for the above all other systems noted to be negative      Exam  Blood pressure 124/70, temperature 97.4 °F (36.3 °C), temperature source Temporal, height 5' (1.524 m), weight 174 lb (78.9 kg). Constitutional:  This is a 82 y. o.female that appears to be in no acute distress. She is pleasant and answers questions appropriately. Breast:  On examination to her breast, patient has no dominant palpable masses. She has fibrocystic changes throughout both breast.  There is no appreciable skin dimpling.   There is no axillary adenopathy or supraclavicular adenopathy appreciable.     Impression  Fibrocystic change both breasts    Plan  We will see her back next year for yearly exam and mammography        15 minutes was spent during this exam with face-to-face counseling, review of data and physical exam

## 2022-10-18 ENCOUNTER — OFFICE VISIT (OUTPATIENT)
Dept: SURGERY | Facility: CLINIC | Age: 87
End: 2022-10-18

## 2022-10-18 VITALS
WEIGHT: 165 LBS | HEART RATE: 72 BPM | HEIGHT: 61 IN | SYSTOLIC BLOOD PRESSURE: 159 MMHG | DIASTOLIC BLOOD PRESSURE: 79 MMHG | BODY MASS INDEX: 31.15 KG/M2

## 2022-10-18 DIAGNOSIS — E66.01 CLASS 2 SEVERE OBESITY DUE TO EXCESS CALORIES WITH SERIOUS COMORBIDITY AND BODY MASS INDEX (BMI) OF 35.0 TO 35.9 IN ADULT: ICD-10-CM

## 2022-10-18 DIAGNOSIS — K81.9 CHOLECYSTITIS: Primary | ICD-10-CM

## 2022-10-18 DIAGNOSIS — F03.90 DEMENTIA, UNSPECIFIED DEMENTIA SEVERITY, UNSPECIFIED DEMENTIA TYPE, UNSPECIFIED WHETHER BEHAVIORAL, PSYCHOTIC, OR MOOD DISTURBANCE OR ANXIETY: ICD-10-CM

## 2022-10-18 PROCEDURE — 99204 OFFICE O/P NEW MOD 45 MIN: CPT | Performed by: SPECIALIST

## 2022-10-18 RX ORDER — FAMOTIDINE 20 MG/1
20 TABLET, FILM COATED ORAL 2 TIMES DAILY
COMMUNITY
Start: 2022-07-27

## 2022-10-18 NOTE — PROGRESS NOTES
Patient: Rupa Hernandez    YOB: 1935    Date: 10/18/2022    Primary Care Provider: Rocco Hauser MD    Chief Complaint   Patient presents with   • Cholelithiasis       Subjective .     History of present illness:   Patient has dementia and is difficult for her to give history.  Her  is with her.  States that she has been having some abdominal pain for over a month now.  Seems intermittent comes and goes moderate in severity most commonly in the epigastric and right upper quadrants but sometimes can be more diffuse.  She had no fevers no vomiting or nausea.  Soft.  Tender in the    The following portions of the patient's history were reviewed and updated as appropriate: allergies, current medications, past family history, past medical history, past social history, past surgical history and problem list.      History:  Past Medical History:   Diagnosis Date   • Arthritis    • Dementia (HCC)    • Dementia (HCC)    • Depression    • Diabetes mellitus (HCC)    • Hyperlipidemia    • Hypertension    • Rectal polyp    • Stroke (HCC)           Past Surgical History:   Procedure Laterality Date   • APPENDECTOMY     • CATARACT EXTRACTION W/ INTRAOCULAR LENS  IMPLANT, BILATERAL     • COLONOSCOPY  07/14/2014    diverticulosis   • DILATION AND CURETTAGE, DIAGNOSTIC / THERAPEUTIC     • HYSTERECTOMY     • JOINT REPLACEMENT      KNEES   • KNEE SURGERY Bilateral     X2   • KYPHOPLASTY N/A 7/11/2019    Procedure: KYPHOPLASTY, Lumbar 1;  Surgeon: Marco Sandy MD;  Location: [facility];  Service: Neurosurgery   • OOPHORECTOMY     • ROTATOR CUFF REPAIR Right        Family History   Problem Relation Age of Onset   • No Known Problems Mother    • No Known Problems Father    • Colon cancer Paternal Grandmother        Social History     Tobacco Use   • Smoking status: Never   • Smokeless tobacco: Never   Vaping Use   • Vaping Use: Never used   Substance Use Topics   • Alcohol use: No   • Drug use: No        Allergies:  Allergies   Allergen Reactions   • Adhesive Tape Rash   • Atorvastatin Myalgia   • Gabapentin Mental Status Change   • Pregabalin Mental Status Change   • Codeine Rash   • Lortab [Hydrocodone-Acetaminophen] Rash   • Morphine And Related Rash   • Morphine Sulfate Rash   • Oxycodone-Acetaminophen Rash   • Percocet [Oxycodone-Acetaminophen] Rash       Medications:     Current Outpatient Medications:   •  acetaminophen (TYLENOL) 650 MG 8 hr tablet, Take 650 mg by mouth Every 8 (Eight) Hours As Needed for Mild Pain ., Disp: , Rfl:   •  amLODIPine (NORVASC) 5 MG tablet, Take 5 mg by mouth Daily., Disp: , Rfl:   •  Calcium Carbonate-Vit D-Min (CALCIUM 600+D3 PLUS MINERALS) 600-800 MG-UNIT tablet, Take 1 tablet by mouth Daily., Disp: , Rfl:   •  cetirizine (ZyrTEC) 10 MG tablet, Take 10 mg by mouth Daily., Disp: , Rfl:   •  CONTOUR NEXT TEST test strip, TEST DAILY AS DIRECTED, Disp: , Rfl: 5  •  docusate sodium (COLACE) 250 MG capsule, Take 250 mg by mouth Daily., Disp: , Rfl:   •  ipratropium (ATROVENT) 0.03 % nasal spray, 2 sprays into the nostril(s) as directed by provider Every 12 (Twelve) Hours As Needed for Rhinitis., Disp: , Rfl:   •  lisinopril-hydrochlorothiazide (PRINZIDE,ZESTORETIC) 20-12.5 MG per tablet, Take 1 tablet by mouth Daily., Disp: , Rfl:   •  memantine (NAMENDA XR) 28 MG capsule sustained-release 24 hr extended release capsule, Take 28 mg by mouth Daily., Disp: , Rfl:   •  meperidine (DEMEROL) 50 MG tablet, Take 1 tablet by mouth Every 4 (Four) Hours As Needed for Moderate Pain ., Disp: 22 tablet, Rfl: 0  •  Multiple Vitamin (MULTI-VITAMIN PO), Take 1 tablet by mouth Daily., Disp: , Rfl:   •  Omega-3 Fatty Acids (FISH OIL) 1000 MG capsule capsule, Take 1,000 mg by mouth Daily With Breakfast., Disp: , Rfl:   •  pantoprazole (PROTONIX) 40 MG EC tablet, TK 1 T PO BID QAM, Disp: , Rfl: 3  •  PARoxetine (PAXIL) 20 MG tablet, Take 20 mg by mouth Daily., Disp: , Rfl:   •  phenazopyridine  "(PYRIDIUM) 95 MG tablet, Take 95 mg by mouth 3 (Three) Times a Day As Needed for bladder spasms., Disp: , Rfl:   •  pravastatin (PRAVACHOL) 40 MG tablet, Take 40 mg by mouth Every Night., Disp: , Rfl:   •  Pumpkin Seed-Soy Germ (AZO BLADDER CONTROL/GO-LESS PO), Take 2 capsules by mouth Daily., Disp: , Rfl:   •  raNITIdine (ZANTAC) 150 MG tablet, Take 150 mg by mouth 2 (Two) Times a Day., Disp: , Rfl: 3  •  rivaroxaban (XARELTO) 20 MG tablet, Take 1 tablet by mouth Daily., Disp: 90 tablet, Rfl: 3  •  traMADol (ULTRAM) 50 MG tablet, Take 1 tablet by mouth Every 6 (Six) Hours As Needed for Moderate Pain ., Disp: 22 tablet, Rfl: 0  •  TRUEPLUS LANCETS 33G misc, Daily. for testing as directed, Disp: , Rfl: 5  •  diclofenac (VOLTAREN) 1 % gel gel, Apply 4 g topically to the appropriate area as directed Daily As Needed (Knee pain)., Disp: , Rfl:   •  famotidine (PEPCID) 20 MG tablet, Take 1 tablet by mouth 2 (Two) Times a Day., Disp: , Rfl:     Objective     Vital Signs:   Vitals:    10/18/22 1222   BP: 159/79   BP Location: Left arm   Patient Position: Sitting   Cuff Size: Adult   Pulse: 72   Weight: 74.8 kg (165 lb)   Height: 154.9 cm (61\")       Physical Exam:     General Appearance:    Alert, cooperative, in no acute distress   Head:    Normocephalic, without obvious abnormality, atraumatic   Eyes:            Lids and lashes normal, conjunctivae and sclerae normal, no  icterus, no pallor, corneas clear,   Ears:    Ears appear intact with no abnormalities noted   Breast:     deferred   Lungs:     Clear to auscultation,respirations regular, even and              Unlabored    Heart:    Regular rhythm and normal rate, no murmur, no gallop.   Chest Wall:    No abnormalities observed   Abdomen    Rectal:    Right upper quadrant and epigastrium.  I do not appreciate any organomegaly or masses.  Good bowel sounds    Deferred   Extremities:   Moves all extremities well, no edema, no cyanosis, no          redness   Pulses:   " Pulses palpable and equal bilaterally   Skin:   No bleeding, bruising or rash   Lymph nodes:   No palpable adenopathy   Neurologic:   Cranial nerves 2 - 12 grossly intact.         Results Review:   I reviewed the patient's new clinical results.        Assessment / Plan:    Diagnoses and all orders for this visit:    1. Cholecystitis (Primary)  Overview:  Added automatically from request for surgery [id number]    Orders:  -     Case Request; Standing  -     Comprehensive Metabolic Panel; Future  -     Lipase; Future  -     ceFAZolin (ANCEF) 2 g in sodium chloride 0.9 % 100 mL IVPB  -     Case Request    2. Class 2 severe obesity due to excess calories with serious comorbidity and body mass index (BMI) of 35.0 to 35.9 in adult ([facility])    3. Dementia, unspecified dementia severity, unspecified dementia type, unspecified whether behavioral, psychotic, or mood disturbance or anxiety ([facility])    Other orders  -     Follow Anesthesia Guidelines / Protocol; Future  -     Obtain Informed Consent; Future  -     Provide NPO Instructions to Patient; Future  -     Chlorhexidine Skin Prep; Future  -     Follow Anesthesia Guidelines / Protocol; Standing  -     Verify / Perform Chlorhexidine Skin Prep; Standing  -     Verify / Perform Chlorhexidine Skin Prep if Indicated (If Not Already Completed); Standing        BMI is >= 30 and <35. (Class 1 Obesity). The following options were offered after discussion;: exercise counseling/recommendations and nutrition counseling/recommendations    Cholecystitis- difficult situation given her dementia but I suspect her symptoms are worse and more longstanding than they understood.  I recommend laparoscopic cholecystectomy.  The risks of bleeding infection injury to structures worsening of her dementia all were discussed.  She most likely need to stay overnight for observation.  We will hold her Xarelto for a 24 hours before.  She is done this in the past has not had bridging  Lovenox.      Electronically signed by Mehnaz Maloney MD  10/18/22  13:17 CDT

## 2022-10-18 NOTE — PATIENT INSTRUCTIONS
Surgery is scheduled for October 27, 2022 at 11:00 a.m.  Prework is scheduled for October 20, 2022 at 9:45 a.m.  Nothing to eat or drink after midnight before surgery.  No Aspirin, Vitamins or Blood Thinners 5 days prior to surgery.  Please report to the hospital main registation for check in on both days.

## 2022-10-19 ENCOUNTER — PATIENT ROUNDING (BHMG ONLY) (OUTPATIENT)
Dept: SURGERY | Facility: CLINIC | Age: 87
End: 2022-10-19

## 2022-10-19 NOTE — PROGRESS NOTES
October 19, 2022    Hello, may I speak with Rupa David?    My name is Miriam JHAVERI    I am  with [facility] GEN SURGERY [facility]  [facility] GENERAL SURGERY  [address]  [facility] [address] [phone].    Before we get started may I verify your date of birth? 1935    I am calling to officially welcome you to our practice and ask about your recent visit. Is this a good time to talk? yes    Tell me about your visit with us. What things went well?  (Per patient spouse) Everyone very nice and helpful, we like Dr. Maloney.       We're always looking for ways to make our patients' experiences even better. Do you have recommendations on ways we may improve?  no    Overall were you satisfied with your first visit to our practice? yes       I appreciate you taking the time to speak with me today. Is there anything else I can do for you? no      Thank you, and have a great day.

## 2022-10-20 ENCOUNTER — PRE-ADMISSION TESTING (OUTPATIENT)
Dept: PREADMISSION TESTING | Facility: HOSPITAL | Age: 87
End: 2022-10-20

## 2022-10-20 VITALS
HEIGHT: 61 IN | OXYGEN SATURATION: 98 % | HEART RATE: 77 BPM | DIASTOLIC BLOOD PRESSURE: 76 MMHG | BODY MASS INDEX: 32.17 KG/M2 | SYSTOLIC BLOOD PRESSURE: 166 MMHG | RESPIRATION RATE: 20 BRPM | WEIGHT: 170.42 LBS

## 2022-10-20 DIAGNOSIS — K81.9 CHOLECYSTITIS: ICD-10-CM

## 2022-10-20 LAB
ALBUMIN SERPL-MCNC: 3.9 G/DL (ref 3.5–5.2)
ALBUMIN/GLOB SERPL: 1.4 G/DL
ALP SERPL-CCNC: 67 U/L (ref 39–117)
ALT SERPL W P-5'-P-CCNC: 15 U/L (ref 1–33)
ANION GAP SERPL CALCULATED.3IONS-SCNC: 8 MMOL/L (ref 5–15)
AST SERPL-CCNC: 21 U/L (ref 1–32)
BILIRUB SERPL-MCNC: 0.2 MG/DL (ref 0–1.2)
BUN SERPL-MCNC: 21 MG/DL (ref 8–23)
BUN/CREAT SERPL: 27.6 (ref 7–25)
CALCIUM SPEC-SCNC: 9.1 MG/DL (ref 8.6–10.5)
CHLORIDE SERPL-SCNC: 99 MMOL/L (ref 98–107)
CO2 SERPL-SCNC: 31 MMOL/L (ref 22–29)
CREAT SERPL-MCNC: 0.76 MG/DL (ref 0.57–1)
DEPRECATED RDW RBC AUTO: 45.3 FL (ref 37–54)
EGFRCR SERPLBLD CKD-EPI 2021: 76.9 ML/MIN/1.73
ERYTHROCYTE [DISTWIDTH] IN BLOOD BY AUTOMATED COUNT: 13 % (ref 12.3–15.4)
GLOBULIN UR ELPH-MCNC: 2.7 GM/DL
GLUCOSE SERPL-MCNC: 115 MG/DL (ref 65–99)
HCT VFR BLD AUTO: 37.6 % (ref 34–46.6)
HGB BLD-MCNC: 12.5 G/DL (ref 12–15.9)
LIPASE SERPL-CCNC: 25 U/L (ref 13–60)
MCH RBC QN AUTO: 31.7 PG (ref 26.6–33)
MCHC RBC AUTO-ENTMCNC: 33.2 G/DL (ref 31.5–35.7)
MCV RBC AUTO: 95.4 FL (ref 79–97)
PLATELET # BLD AUTO: 219 10*3/MM3 (ref 140–450)
PMV BLD AUTO: 9.8 FL (ref 6–12)
POTASSIUM SERPL-SCNC: 3.4 MMOL/L (ref 3.5–5.2)
PROT SERPL-MCNC: 6.6 G/DL (ref 6–8.5)
RBC # BLD AUTO: 3.94 10*6/MM3 (ref 3.77–5.28)
SODIUM SERPL-SCNC: 138 MMOL/L (ref 136–145)
WBC NRBC COR # BLD: 7.75 10*3/MM3 (ref 3.4–10.8)

## 2022-10-20 PROCEDURE — 80053 COMPREHEN METABOLIC PANEL: CPT

## 2022-10-20 PROCEDURE — 93010 ELECTROCARDIOGRAM REPORT: CPT | Performed by: INTERNAL MEDICINE

## 2022-10-20 PROCEDURE — 83690 ASSAY OF LIPASE: CPT

## 2022-10-20 PROCEDURE — 36415 COLL VENOUS BLD VENIPUNCTURE: CPT

## 2022-10-20 PROCEDURE — 85027 COMPLETE CBC AUTOMATED: CPT

## 2022-10-20 PROCEDURE — 93005 ELECTROCARDIOGRAM TRACING: CPT

## 2022-10-20 NOTE — DISCHARGE INSTRUCTIONS
Before you come to the hospital        Arrival time: AS DIRECTED BY OFFICE     YOU MAY TAKE THE FOLLOWING MEDICATION(S) THE MORNING OF SURGERY WITH A SIP OF WATER: PLEASE HOLD LISINOPRIL 24 HOURS PREOP           ALL OTHER HOME MEDICATION CHECK WITH YOUR PHYSICIAN (especially if you are taking diabetes medicines or blood thinners)    Do not take any Erectile Dysfunction medications (EX: CIALIS, VIAGRA) 24 hours prior to surgery.      If you were given and instructed to use a germ- killing soap, use as directed the night before surgery and again the morning of surgery or as directed by your surgeon.    (See attached information for How to Use Chlorhexidine for Bathing if applicable.)            Eating and drinking restrictions prior to scheduled arrival time    2 Hours before arrival time STOP   Drinking Clear liquids (water, apple juice-no pulp)     6 Hours before arrival time STOP   Milk or drinks that contain milk, full liquids    6 Hours before arrival time STOP   Light meals or foods, such as toast or cereal    8 Hours before arrival time STOP   Heavy foods, such as meat, fried foods, or fatty foods    (It is extremely important that you follow these guidelines to prevent delay or cancelation of your procedure)     Clear Liquids  Water and flavored water                                                                      Clear Fruit juices, such as cranberry juice and apple juice.  Black coffee (NO cream of any kind, including powdered).  Plain tea  Clear bouillon or broth.  Flavored gelatin.  Soda.  Gatorade or Powerade.  Full liquid examples  Juices that have pulp.  Frozen ice pops that contain fruit pieces.  Coffee with creamer  Milk.  Yogurt.                MANAGING PAIN AFTER SURGERY    We know you are probably wondering what your pain will be like after surgery.  Following surgery it is unrealistic to expect you will not have pain.   Pain is how our bodies let us know that something is wrong or cautions us  to be careful.  That said, our goal is to make your pain tolerable.    Methods we may use to treat your pain include (oral or IV medications, PCAs, epidurals, nerve blocks, etc.)   While some procedures require IV pain medications for a short time after surgery, transitioning to pain medications by mouth allows for better management of pain.   Your nurse will encourage you to take oral pain medications whenever possible.  IV medications work almost immediately, but only last a short while.  Taking medications by mouth allows for a more constant level of medication in your blood stream for a longer period of time.      Once your pain is out of control it is harder to get back under control.  It is important you are aware when your next dose of pain medication is due.  If you are admitted, your nurse may write the time of your next dose on the white board in your room to help you remember.      We are interested in your pain and encourage you to inform us about aggravating factors during your visit.   Many times a simple repositioning every few hours can make a big difference.    If your physician says it is okay, do not let your pain prevent you from getting out of bed. Be sure to call your nurse for assistance prior to getting up so you do not fall.      Before surgery, please decide your tolerable pain goal.  These faces help describe the pain ratings we use on a 0-10 scale.   Be prepared to tell us your goal and whether or not you take pain or anxiety medications at home.          Preparing for Surgery  Preparing for surgery is an important part of your care. It can make things go more smoothly and help you avoid complications. The steps leading up to surgery may vary among hospitals. Follow all instructions given to you by your health care providers. Ask questions if you do not understand something. Talk about any concerns that you have.  Here are some questions to consider asking before your surgery:  If my  surgery is not an emergency (is elective), when would be the best time to have the surgery?  What arrangements do I need to make for work, home, or school?  What will my recovery be like? How long will it be before I can return to normal activities?  Will I need to prepare my home? Will I need to arrange care for me or my children?  Should I expect to have pain after surgery? What are my pain management options? Are there nonmedical options that I can try for pain?  Tell a health care provider about:  Any allergies you have.  All medicines you are taking, including vitamins, herbs, eye drops, creams, and over-the-counter medicines.  Any problems you or family members have had with anesthetic medicines.  Any blood disorders you have.  Any surgeries you have had.  Any medical conditions you have.  Whether you are pregnant or may be pregnant.  What are the risks?  The risks and complications of surgery depend on the specific procedure that you have. Discuss all the risks with your health care providers before your surgery. Ask about common surgical complications, which may include:  Infection.  Bleeding or a need for blood replacement (transfusion).  Allergic reactions to medicines.  Damage to surrounding nerves, tissues, or structures.  A blood clot.  Scarring.  Failure of the surgery to correct the problem.  Follow these instructions before the procedure:  Several days or weeks before your procedure  You may have a physical exam by your primary health care provider to make sure it is safe for you to have surgery.  You may have testing. This may include a chest X-ray, blood and urine tests, electrocardiogram (ECG), or other testing.  Ask your health care provider about:  Changing or stopping your regular medicines. This is especially important if you are taking diabetes medicines or blood thinners.  Taking medicines such as aspirin and ibuprofen. These medicines can thin your blood. Do not take these medicines unless  your health care provider tells you to take them.  Taking over-the-counter medicines, vitamins, herbs, and supplements.  Do not use any products that contain nicotine or tobacco, such as cigarettes and e-cigarettes. If you need help quitting, ask your health care provider.  Avoid alcohol.  Ask your health care provider if there are exercises you can do to prepare for surgery.  Eat a healthy diet.   Plan to have someone take you home from the hospital or clinic.  Plan to have a responsible adult care for you for at least 24 hours after you leave the hospital or clinic. This is important.  The day before your procedure  You may be given antibiotic medicine to take by mouth to help prevent infection. Take it as told by your health care provider.  You may be asked to shower with a germ-killing soap.  Follow instructions from your health care provider about eating and drinking restrictions. This includes gum, mints and hard candy.  Pack comfortable clothes according to your procedure.   The day of your procedure  You may need to take another shower with a germ-killing soap before you leave home in the morning.  With a small sip of water, take only the medicines that you are told to take.  Remove all jewelry including rings.   Leave anything you consider valuable at home except hearing aids if needed.  Do not wear any makeup, nail polish, powder, deodorant, lotion, hair accessories, or anything on your skin or body except your clothes.  If you will be staying in the hospital, bring a case to hold your glasses, contacts, or dentures. You may also want to bring your robe and non-skid footwear.  If you wear oxygen at home, bring it with you the day of surgery.  If instructed by your health care provider, bring your sleep apnea device with you on the day of your surgery (if this applies to you).  You may want to leave your suitcase and sleep apnea device in the car until after surgery.   Arrive at the hospital as  scheduled.  Bring a friend or family member with you who can help to answer questions and be present while you meet with your health care provider.  At the hospital  When you arrive at the hospital:  Go to registration located at the main entrance of the hospital. You will be registered and given a beeper and a sticker sheet. Take the stickers to the Outpatient nurses desk and place in the black tray. This is to notify staff that you have arrived. Then return to the lobby to wait.   When your beeper lights up and vibrates proceed through the double doors, under the stairs, and a member of the Outpatient Surgery staff will escort you to your preoperative room.  You may have to wear compression sleeves. These help to prevent blood clots and reduce swelling in your legs.  An IV may be inserted into one of your veins.              In the operating room, you may be given one or more of the following:        A medicine to help you relax (sedative).        A medicine to numb the area (local anesthetic).        A medicine to make you fall asleep (general anesthetic).        A medicine that is injected into an area of your body to numb everything below the                      injection site (regional anesthetic).  You may be given an antibiotic through your IV to help prevent infection.  Your surgical site will be marked or identified.    Contact a health care provider if you:  Develop a fever of more than 100.4°F (38°C) or other feelings of illness during the 48 hours before your surgery.  Have symptoms that get worse.  Have questions or concerns about your surgery.  Summary  Preparing for surgery can make the procedure go more smoothly and lower your risk of complications.  Before surgery, make a list of questions and concerns to discuss with your surgeon. Ask about the risks and possible complications.  In the days or weeks before your surgery, follow all instructions from your health care provider. You may need to stop  smoking, avoid alcohol, follow eating restrictions, and change or stop your regular medicines.  Contact your surgeon if you develop a fever or other signs of illness during the few days before your surgery.  This information is not intended to replace advice given to you by your health care provider. Make sure you discuss any questions you have with your health care provider.  Document Revised: 12/21/2018 Document Reviewed: 10/23/2018  Elsevier Patient Education © 2021 [employer]

## 2022-10-24 LAB
QT INTERVAL: 392 MS
QTC INTERVAL: 407 MS

## 2022-10-27 ENCOUNTER — ANESTHESIA EVENT (OUTPATIENT)
Dept: PERIOP | Facility: HOSPITAL | Age: 87
End: 2022-10-27

## 2022-10-27 ENCOUNTER — HOSPITAL ENCOUNTER (OUTPATIENT)
Facility: HOSPITAL | Age: 87
Discharge: HOME | End: 2022-10-28
Attending: SPECIALIST | Admitting: SPECIALIST

## 2022-10-27 ENCOUNTER — ANESTHESIA (OUTPATIENT)
Dept: PERIOP | Facility: HOSPITAL | Age: 87
End: 2022-10-27

## 2022-10-27 DIAGNOSIS — K81.9 CHOLECYSTITIS: ICD-10-CM

## 2022-10-27 LAB
GLUCOSE BLDC GLUCOMTR-MCNC: 118 MG/DL (ref 70–130)
GLUCOSE BLDC GLUCOMTR-MCNC: 129 MG/DL (ref 70–130)

## 2022-10-27 PROCEDURE — 25010000002 FENTANYL CITRATE (PF) 100 MCG/2ML SOLUTION: Performed by: NURSE ANESTHETIST, CERTIFIED REGISTERED

## 2022-10-27 PROCEDURE — 47562 LAPAROSCOPIC CHOLECYSTECTOMY: CPT | Performed by: SPECIALIST

## 2022-10-27 PROCEDURE — 25010000002 ONDANSETRON PER 1 MG: Performed by: NURSE ANESTHETIST, CERTIFIED REGISTERED

## 2022-10-27 PROCEDURE — 63710000001 DOCUSATE SODIUM 100 MG CAPSULE: Performed by: SPECIALIST

## 2022-10-27 PROCEDURE — 88304 TISSUE EXAM BY PATHOLOGIST: CPT | Performed by: SPECIALIST

## 2022-10-27 PROCEDURE — 25010000002 PROPOFOL 10 MG/ML EMULSION: Performed by: NURSE ANESTHETIST, CERTIFIED REGISTERED

## 2022-10-27 PROCEDURE — A9270 NON-COVERED ITEM OR SERVICE: HCPCS | Performed by: SPECIALIST

## 2022-10-27 PROCEDURE — G0378 HOSPITAL OBSERVATION PER HR: HCPCS

## 2022-10-27 PROCEDURE — 82962 GLUCOSE BLOOD TEST: CPT

## 2022-10-27 PROCEDURE — 25010000002 DROPERIDOL PER 5 MG: Performed by: NURSE ANESTHETIST, CERTIFIED REGISTERED

## 2022-10-27 PROCEDURE — 63710000001 MEMANTINE 5 MG TABLET: Performed by: SPECIALIST

## 2022-10-27 PROCEDURE — 82962 GLUCOSE BLOOD TEST: CPT | Performed by: FAMILY MEDICINE

## 2022-10-27 PROCEDURE — 25010000002 CEFAZOLIN PER 500 MG: Performed by: SPECIALIST

## 2022-10-27 RX ORDER — PANTOPRAZOLE SODIUM 40 MG/1
40 TABLET, DELAYED RELEASE ORAL
Status: DISCONTINUED | OUTPATIENT
Start: 2022-10-28 | End: 2022-10-28 | Stop reason: HOSPADM

## 2022-10-27 RX ORDER — FENTANYL CITRATE 50 UG/ML
25 INJECTION, SOLUTION INTRAMUSCULAR; INTRAVENOUS
Status: DISCONTINUED | OUTPATIENT
Start: 2022-10-27 | End: 2022-10-27 | Stop reason: HOSPADM

## 2022-10-27 RX ORDER — FLUMAZENIL 0.1 MG/ML
0.2 INJECTION INTRAVENOUS AS NEEDED
Status: DISCONTINUED | OUTPATIENT
Start: 2022-10-27 | End: 2022-10-27 | Stop reason: HOSPADM

## 2022-10-27 RX ORDER — DROPERIDOL 2.5 MG/ML
0.62 INJECTION, SOLUTION INTRAMUSCULAR; INTRAVENOUS ONCE AS NEEDED
Status: DISCONTINUED | OUTPATIENT
Start: 2022-10-27 | End: 2022-10-27 | Stop reason: HOSPADM

## 2022-10-27 RX ORDER — SODIUM CHLORIDE 9 MG/ML
INJECTION, SOLUTION INTRAVENOUS AS NEEDED
Status: DISCONTINUED | OUTPATIENT
Start: 2022-10-27 | End: 2022-10-27 | Stop reason: HOSPADM

## 2022-10-27 RX ORDER — KETOROLAC TROMETHAMINE 30 MG/ML
15 INJECTION, SOLUTION INTRAMUSCULAR; INTRAVENOUS EVERY 6 HOURS PRN
Status: DISCONTINUED | OUTPATIENT
Start: 2022-10-27 | End: 2022-10-28 | Stop reason: HOSPADM

## 2022-10-27 RX ORDER — AMLODIPINE BESYLATE 5 MG/1
5 TABLET ORAL DAILY
Status: DISCONTINUED | OUTPATIENT
Start: 2022-10-28 | End: 2022-10-28 | Stop reason: HOSPADM

## 2022-10-27 RX ORDER — SODIUM CHLORIDE 0.9 % (FLUSH) 0.9 %
3-10 SYRINGE (ML) INJECTION AS NEEDED
Status: DISCONTINUED | OUTPATIENT
Start: 2022-10-27 | End: 2022-10-27 | Stop reason: HOSPADM

## 2022-10-27 RX ORDER — ONDANSETRON 2 MG/ML
4 INJECTION INTRAMUSCULAR; INTRAVENOUS EVERY 6 HOURS PRN
Status: DISCONTINUED | OUTPATIENT
Start: 2022-10-27 | End: 2022-10-28 | Stop reason: HOSPADM

## 2022-10-27 RX ORDER — FENTANYL CITRATE 50 UG/ML
INJECTION, SOLUTION INTRAMUSCULAR; INTRAVENOUS AS NEEDED
Status: DISCONTINUED | OUTPATIENT
Start: 2022-10-27 | End: 2022-10-27 | Stop reason: SURG

## 2022-10-27 RX ORDER — LABETALOL HYDROCHLORIDE 5 MG/ML
5 INJECTION, SOLUTION INTRAVENOUS
Status: DISCONTINUED | OUTPATIENT
Start: 2022-10-27 | End: 2022-10-27 | Stop reason: HOSPADM

## 2022-10-27 RX ORDER — SODIUM CHLORIDE 0.9 % (FLUSH) 0.9 %
3 SYRINGE (ML) INJECTION EVERY 12 HOURS SCHEDULED
Status: DISCONTINUED | OUTPATIENT
Start: 2022-10-27 | End: 2022-10-27 | Stop reason: HOSPADM

## 2022-10-27 RX ORDER — LIDOCAINE HYDROCHLORIDE 10 MG/ML
0.5 INJECTION, SOLUTION EPIDURAL; INFILTRATION; INTRACAUDAL; PERINEURAL ONCE AS NEEDED
Status: DISCONTINUED | OUTPATIENT
Start: 2022-10-27 | End: 2022-10-27 | Stop reason: HOSPADM

## 2022-10-27 RX ORDER — DROPERIDOL 2.5 MG/ML
INJECTION, SOLUTION INTRAMUSCULAR; INTRAVENOUS AS NEEDED
Status: DISCONTINUED | OUTPATIENT
Start: 2022-10-27 | End: 2022-10-27 | Stop reason: SURG

## 2022-10-27 RX ORDER — BUPIVACAINE HCL/0.9 % NACL/PF 0.1 %
2 PLASTIC BAG, INJECTION (ML) EPIDURAL ONCE
Status: COMPLETED | OUTPATIENT
Start: 2022-10-27 | End: 2022-10-27

## 2022-10-27 RX ORDER — MAGNESIUM HYDROXIDE 1200 MG/15ML
LIQUID ORAL AS NEEDED
Status: DISCONTINUED | OUTPATIENT
Start: 2022-10-27 | End: 2022-10-27 | Stop reason: HOSPADM

## 2022-10-27 RX ORDER — ONDANSETRON 2 MG/ML
INJECTION INTRAMUSCULAR; INTRAVENOUS AS NEEDED
Status: DISCONTINUED | OUTPATIENT
Start: 2022-10-27 | End: 2022-10-27 | Stop reason: SURG

## 2022-10-27 RX ORDER — NEOSTIGMINE METHYLSULFATE 5 MG/5 ML
SYRINGE (ML) INTRAVENOUS AS NEEDED
Status: DISCONTINUED | OUTPATIENT
Start: 2022-10-27 | End: 2022-10-27 | Stop reason: SURG

## 2022-10-27 RX ORDER — TRAMADOL HYDROCHLORIDE 50 MG/1
50 TABLET ORAL EVERY 6 HOURS PRN
Status: DISCONTINUED | OUTPATIENT
Start: 2022-10-27 | End: 2022-10-28 | Stop reason: HOSPADM

## 2022-10-27 RX ORDER — DOCUSATE SODIUM 100 MG/1
200 CAPSULE, LIQUID FILLED ORAL 2 TIMES DAILY
Status: DISCONTINUED | OUTPATIENT
Start: 2022-10-27 | End: 2022-10-28 | Stop reason: HOSPADM

## 2022-10-27 RX ORDER — SODIUM CHLORIDE, SODIUM LACTATE, POTASSIUM CHLORIDE, CALCIUM CHLORIDE 600; 310; 30; 20 MG/100ML; MG/100ML; MG/100ML; MG/100ML
50 INJECTION, SOLUTION INTRAVENOUS CONTINUOUS
Status: DISCONTINUED | OUTPATIENT
Start: 2022-10-27 | End: 2022-10-28 | Stop reason: HOSPADM

## 2022-10-27 RX ORDER — ONDANSETRON 2 MG/ML
4 INJECTION INTRAMUSCULAR; INTRAVENOUS
Status: DISCONTINUED | OUTPATIENT
Start: 2022-10-27 | End: 2022-10-27 | Stop reason: HOSPADM

## 2022-10-27 RX ORDER — PROPOFOL 10 MG/ML
VIAL (ML) INTRAVENOUS AS NEEDED
Status: DISCONTINUED | OUTPATIENT
Start: 2022-10-27 | End: 2022-10-27 | Stop reason: SURG

## 2022-10-27 RX ORDER — VECURONIUM BROMIDE 1 MG/ML
INJECTION, POWDER, LYOPHILIZED, FOR SOLUTION INTRAVENOUS AS NEEDED
Status: DISCONTINUED | OUTPATIENT
Start: 2022-10-27 | End: 2022-10-27 | Stop reason: SURG

## 2022-10-27 RX ORDER — PAROXETINE HYDROCHLORIDE 20 MG/1
20 TABLET, FILM COATED ORAL DAILY
Status: DISCONTINUED | OUTPATIENT
Start: 2022-10-28 | End: 2022-10-28 | Stop reason: HOSPADM

## 2022-10-27 RX ORDER — BUPIVACAINE HYDROCHLORIDE AND EPINEPHRINE 5; 5 MG/ML; UG/ML
INJECTION, SOLUTION PERINEURAL AS NEEDED
Status: DISCONTINUED | OUTPATIENT
Start: 2022-10-27 | End: 2022-10-27 | Stop reason: HOSPADM

## 2022-10-27 RX ORDER — NALOXONE HCL 0.4 MG/ML
0.04 VIAL (ML) INJECTION AS NEEDED
Status: DISCONTINUED | OUTPATIENT
Start: 2022-10-27 | End: 2022-10-27 | Stop reason: HOSPADM

## 2022-10-27 RX ORDER — BUPIVACAINE HCL/0.9 % NACL/PF 0.1 %
2 PLASTIC BAG, INJECTION (ML) EPIDURAL EVERY 8 HOURS
Status: COMPLETED | OUTPATIENT
Start: 2022-10-27 | End: 2022-10-28

## 2022-10-27 RX ORDER — MEMANTINE HYDROCHLORIDE 5 MG/1
10 TABLET ORAL EVERY 12 HOURS SCHEDULED
Status: DISCONTINUED | OUTPATIENT
Start: 2022-10-27 | End: 2022-10-28 | Stop reason: HOSPADM

## 2022-10-27 RX ORDER — METOPROLOL TARTRATE 5 MG/5ML
INJECTION INTRAVENOUS AS NEEDED
Status: DISCONTINUED | OUTPATIENT
Start: 2022-10-27 | End: 2022-10-27 | Stop reason: SURG

## 2022-10-27 RX ADMIN — SODIUM CHLORIDE, POTASSIUM CHLORIDE, SODIUM LACTATE AND CALCIUM CHLORIDE 50 ML/HR: 600; 310; 30; 20 INJECTION, SOLUTION INTRAVENOUS at 12:49

## 2022-10-27 RX ADMIN — CEFAZOLIN 2 G: 2 INJECTION, POWDER, FOR SOLUTION INTRAMUSCULAR; INTRAVENOUS at 21:26

## 2022-10-27 RX ADMIN — LIDOCAINE HYDROCHLORIDE 100 MG: 20 INJECTION INTRAVENOUS at 12:56

## 2022-10-27 RX ADMIN — VECURONIUM BROMIDE 5 MG: 1 INJECTION, POWDER, LYOPHILIZED, FOR SOLUTION INTRAVENOUS at 12:56

## 2022-10-27 RX ADMIN — MEMANTINE HYDROCHLORIDE 10 MG: 5 TABLET, FILM COATED ORAL at 21:26

## 2022-10-27 RX ADMIN — GLYCOPYRROLATE 0.4 MG: 0.2 INJECTION INTRAMUSCULAR; INTRAVENOUS at 13:52

## 2022-10-27 RX ADMIN — FENTANYL CITRATE 200 MCG: 50 INJECTION INTRAMUSCULAR; INTRAVENOUS at 12:56

## 2022-10-27 RX ADMIN — METOPROLOL TARTRATE 2.5 MG: 1 INJECTION, SOLUTION INTRAVENOUS at 13:43

## 2022-10-27 RX ADMIN — Medication 3.5 MG: at 13:52

## 2022-10-27 RX ADMIN — DOCUSATE SODIUM 200 MG: 100 CAPSULE, LIQUID FILLED ORAL at 21:26

## 2022-10-27 RX ADMIN — SODIUM CHLORIDE, POTASSIUM CHLORIDE, SODIUM LACTATE AND CALCIUM CHLORIDE 50 ML/HR: 600; 310; 30; 20 INJECTION, SOLUTION INTRAVENOUS at 15:06

## 2022-10-27 RX ADMIN — DROPERIDOL 1.25 MG: 2.5 INJECTION, SOLUTION INTRAMUSCULAR; INTRAVENOUS at 12:56

## 2022-10-27 RX ADMIN — ONDANSETRON 8 MG: 2 INJECTION INTRAMUSCULAR; INTRAVENOUS at 13:15

## 2022-10-27 RX ADMIN — Medication 2 G: at 12:53

## 2022-10-27 RX ADMIN — PROPOFOL 100 MG: 10 INJECTION, EMULSION INTRAVENOUS at 12:56

## 2022-10-27 RX ADMIN — METOPROLOL TARTRATE 2.5 MG: 1 INJECTION, SOLUTION INTRAVENOUS at 13:28

## 2022-10-27 NOTE — ANESTHESIA PROCEDURE NOTES
Airway  Urgency: elective    Airway not difficult    General Information and Staff    Patient location during procedure: OR  CRNA/CAA: Baljinder Charles CRNA    Indications and Patient Condition  Indications for airway management: airway protection    Preoxygenated: yes  MILS maintained throughout  Mask difficulty assessment: 1 - vent by mask    Final Airway Details  Final airway type: endotracheal airway      Successful airway: ETT  Cuffed: yes   Successful intubation technique: direct laryngoscopy  Endotracheal tube insertion site: oral  Blade: Amanda  Blade size: 2  ETT size (mm): 7.0  Cormack-Lehane Classification: grade I - full view of glottis  Placement verified by: chest auscultation and capnometry   Cuff volume (mL): 5  Measured from: lips  ETT/EBT  to lips (cm): 20  Number of attempts at approach: 1  Assessment: lips, teeth, and gum same as pre-op and atraumatic intubation

## 2022-10-27 NOTE — ANESTHESIA PREPROCEDURE EVALUATION
Anesthesia Evaluation     Patient summary reviewed   no history of anesthetic complications:  NPO Solid Status: > 8 hours  NPO Liquid Status: > 8 hours           Airway   Mallampati: III  TM distance: >3 FB  Comment: Patient noncompliant with complete exam, only opened small amount. Do not believe this to be reflective of difficulty  Dental - normal exam     Pulmonary - negative pulmonary ROS   Cardiovascular   Exercise tolerance: poor (<4 METS)    ECG reviewed  PT is on anticoagulation therapy    (+) hypertension, valvular problems/murmurs AS, dysrhythmias Atrial Fib, hyperlipidemia,       Neuro/Psych  (+) CVA (2015 ), psychiatric history Depression, dementia,    GI/Hepatic/Renal/Endo    (+) obesity,   diabetes mellitus,     Musculoskeletal     (+) back pain,   Abdominal    Substance History      OB/GYN          Other   arthritis,                        Anesthesia Plan    ASA 3     general     (xarelto held 2 days; high risk POCD discussed; frail. Unable to answer questions-- at bedside )  intravenous induction     Anesthetic plan, risks, benefits, and alternatives have been provided, discussed and informed consent has been obtained with: patient and spouse/significant other.

## 2022-10-27 NOTE — ANESTHESIA POSTPROCEDURE EVALUATION
Patient: Rupa Hernandez    Procedure Summary     Date: 10/27/22 Room / Location:  PAD OR  /  [facility] OR    Anesthesia Start: 1253 Anesthesia Stop: 1403    Procedure: CHOLECYSTECTOMY LAPAROSCOPIC (Abdomen) Diagnosis:       Cholecystitis      (Cholecystitis [K81.9])    Surgeons: Mehnaz Maloney MD Provider: Baljinder Charles CRNA    Anesthesia Type: general ASA Status: 3          Anesthesia Type: general    Vitals  Vitals Value Taken Time   /86 10/27/22 1407   Temp 97.8 °F (36.6 °C) 10/27/22 1402   Pulse 72 10/27/22 1408   Resp 17 10/27/22 1407   SpO2 100 % 10/27/22 1408   Vitals shown include unvalidated device data.        Post Anesthesia Care and Evaluation    Patient location during evaluation: PACU  Patient participation: complete - patient participated  Level of consciousness: awake and alert  Pain management: adequate    Airway patency: patent  Anesthetic complications: No anesthetic complications    Cardiovascular status: acceptable  Respiratory status: acceptable  Hydration status: acceptable    Comments: Blood pressure (!) 192/86, pulse 72, temperature 97.8 °F (36.6 °C), temperature source Temporal, resp. rate 17, SpO2 99 %, not currently breastfeeding.    Pt discharged from PACU based on otilio score >8

## 2022-10-28 VITALS
OXYGEN SATURATION: 94 % | BODY MASS INDEX: 32.17 KG/M2 | WEIGHT: 170.42 LBS | TEMPERATURE: 98.6 F | RESPIRATION RATE: 16 BRPM | SYSTOLIC BLOOD PRESSURE: 134 MMHG | DIASTOLIC BLOOD PRESSURE: 55 MMHG | HEIGHT: 61 IN | HEART RATE: 67 BPM

## 2022-10-28 PROBLEM — K81.9 CHOLECYSTITIS: Status: RESOLVED | Noted: 2022-10-18 | Resolved: 2022-10-28

## 2022-10-28 LAB
CYTO UR: NORMAL
LAB AP CASE REPORT: NORMAL
Lab: NORMAL
PATH REPORT.FINAL DX SPEC: NORMAL
PATH REPORT.GROSS SPEC: NORMAL

## 2022-10-28 PROCEDURE — 63710000001 LISINOPRIL 10 MG TABLET 1,000 EACH BOTTLE: Performed by: SPECIALIST

## 2022-10-28 PROCEDURE — 63710000001 PANTOPRAZOLE 40 MG TABLET DELAYED-RELEASE: Performed by: SPECIALIST

## 2022-10-28 PROCEDURE — 63710000001 DOCUSATE SODIUM 100 MG CAPSULE: Performed by: SPECIALIST

## 2022-10-28 PROCEDURE — A9270 NON-COVERED ITEM OR SERVICE: HCPCS | Performed by: SPECIALIST

## 2022-10-28 PROCEDURE — 63710000001 PAROXETINE 20 MG TABLET: Performed by: SPECIALIST

## 2022-10-28 PROCEDURE — 63710000001 MEMANTINE 5 MG TABLET: Performed by: SPECIALIST

## 2022-10-28 PROCEDURE — G0378 HOSPITAL OBSERVATION PER HR: HCPCS

## 2022-10-28 PROCEDURE — 25010000002 CEFAZOLIN PER 500 MG: Performed by: SPECIALIST

## 2022-10-28 PROCEDURE — 63710000001 HYDROCHLOROTHIAZIDE 25 MG TABLET 100 EACH BOTTLE: Performed by: SPECIALIST

## 2022-10-28 PROCEDURE — 63710000001 AMLODIPINE 5 MG TABLET: Performed by: SPECIALIST

## 2022-10-28 RX ORDER — TRAMADOL HYDROCHLORIDE 50 MG/1
50 TABLET ORAL EVERY 6 HOURS PRN
Qty: 20 TABLET | Refills: 0 | Status: SHIPPED | OUTPATIENT
Start: 2022-10-28

## 2022-10-28 RX ADMIN — MEMANTINE HYDROCHLORIDE 10 MG: 5 TABLET, FILM COATED ORAL at 08:21

## 2022-10-28 RX ADMIN — CEFAZOLIN 2 G: 2 INJECTION, POWDER, FOR SOLUTION INTRAMUSCULAR; INTRAVENOUS at 05:58

## 2022-10-28 RX ADMIN — AMLODIPINE BESYLATE 5 MG: 5 TABLET ORAL at 08:22

## 2022-10-28 RX ADMIN — DOCUSATE SODIUM 200 MG: 100 CAPSULE, LIQUID FILLED ORAL at 08:22

## 2022-10-28 RX ADMIN — PAROXETINE 20 MG: 20 TABLET, FILM COATED ORAL at 08:21

## 2022-10-28 RX ADMIN — PANTOPRAZOLE SODIUM 40 MG: 40 TABLET, DELAYED RELEASE ORAL at 05:59

## 2022-10-28 RX ADMIN — LISINOPRIL: 10 TABLET ORAL at 08:21

## 2024-09-13 ENCOUNTER — TELEPHONE (OUTPATIENT)
Dept: UROLOGY | Age: 89
End: 2024-09-13

## 2024-09-16 ENCOUNTER — TELEPHONE (OUTPATIENT)
Dept: UROLOGY | Age: 89
End: 2024-09-16

## 2024-10-01 ENCOUNTER — TELEPHONE (OUTPATIENT)
Age: 89
End: 2024-10-01

## 2024-10-01 NOTE — TELEPHONE ENCOUNTER
Left voicemail for patient to call back if wanting to move appt from 10/7 to 10/3 with Susana, provided there are openings available upon call in.   10/1 [facility]

## 2024-10-03 DIAGNOSIS — M25.512 LEFT SHOULDER PAIN, UNSPECIFIED CHRONICITY: Primary | ICD-10-CM

## 2024-10-04 NOTE — PROGRESS NOTES
Orthopaedic Clinic Note - Established Patient    NAME:  Pallavi Gates   : 1935  MRN: [id number]    10/7/2024    CHIEF COMPLAINT:  follow up for left shoulder     HISTORY OF PRESENT ILLNESS:   The patient is a 89 y.o. female  with multiple medical comorbidities including advanced Alzheimer's disease who returns today with her children for follow up of  left shoulder fracture.  She is currently residing at [facility]. Since last visit, pain has improved. She has been nonweightbearing of left upper extremity in a sling.    Past Medical History:        Diagnosis Date    Arthritis     Atrial fibrillation (HCC)     Depression     Hyperlipidemia     Hypertension     Osteoarthritis     Osteoporosis     Stroke (HCC) 2016    Wears glasses        Past Surgical History:        Procedure Laterality Date    APPENDECTOMY      BREAST BIOPSY      Right-fibrocyst removed    CATARACT REMOVAL      DILATION AND CURETTAGE OF UTERUS      x 2    EYE SURGERY      cataracts    HYSTERECTOMY, VAGINAL      INSERTABLE CARDIAC MONITOR      JOINT REPLACEMENT      left and right    KNEE SURGERY      L replacement    ROTATOR CUFF REPAIR Right     TOTAL KNEE ARTHROPLASTY      right       Current Medications:   Prior to Admission medications    Medication Sig Start Date End Date Taking? Authorizing Provider   acetaminophen (TYLENOL) 500 MG tablet Take 1 tablet by mouth 3 times daily 24  Yes Yordan Lincoln MD   famotidine (PEPCID) 20 MG tablet Take 1 tablet by mouth 2 times daily 24  Yes Yordan Lincoln MD   pantoprazole (PROTONIX) 40 MG tablet Take 1 tablet by mouth daily 24  Yes Yordan Lincoln MD   Psyllium 0.36 g CAPS Take 1 packet by mouth daily   Yes Yordan Lincoln MD   tuberculin 5 UNIT/0.1ML injection Inject 0.1 mLs into the skin once   Yes Yordan Lincoln MD   bisacodyl (DULCOLAX) 10 MG suppository Place 1 suppository rectally daily   Yes Yordan Lincoln MD   magnesium

## 2024-10-07 ENCOUNTER — OFFICE VISIT (OUTPATIENT)
Age: 89
End: 2024-10-07
Payer: MEDICARE

## 2024-10-07 DIAGNOSIS — S42.222D CLOSED 2-PART DISPLACED FRACTURE OF SURGICAL NECK OF LEFT HUMERUS WITH ROUTINE HEALING: Primary | ICD-10-CM

## 2024-10-07 PROCEDURE — G8484 FLU IMMUNIZE NO ADMIN: HCPCS

## 2024-10-07 PROCEDURE — 99213 OFFICE O/P EST LOW 20 MIN: CPT

## 2024-10-07 PROCEDURE — 1123F ACP DISCUSS/DSCN MKR DOCD: CPT

## 2024-10-07 PROCEDURE — 1036F TOBACCO NON-USER: CPT

## 2024-10-07 PROCEDURE — G8427 DOCREV CUR MEDS BY ELIG CLIN: HCPCS

## 2024-10-07 PROCEDURE — G8421 BMI NOT CALCULATED: HCPCS

## 2024-10-07 PROCEDURE — 1090F PRES/ABSN URINE INCON ASSESS: CPT

## 2024-10-07 RX ORDER — ACETAMINOPHEN 500 MG
500 TABLET ORAL 3 TIMES DAILY
COMMUNITY
Start: 2024-09-09

## 2024-10-07 RX ORDER — BISACODYL 10 MG
10 SUPPOSITORY, RECTAL RECTAL DAILY
COMMUNITY

## 2024-10-07 RX ORDER — PANTOPRAZOLE SODIUM 40 MG/1
40 TABLET, DELAYED RELEASE ORAL DAILY
COMMUNITY
Start: 2024-09-23

## 2024-10-07 RX ORDER — FAMOTIDINE 20 MG/1
1 TABLET, FILM COATED ORAL 2 TIMES DAILY
COMMUNITY
Start: 2024-07-08

## 2024-10-18 ENCOUNTER — TELEPHONE (OUTPATIENT)
Age: 89
End: 2024-10-18

## 2024-11-01 NOTE — PROGRESS NOTES
magnesium hydroxide (MILK OF MAGNESIA) 400 MG/5ML suspension Take 30 mLs by mouth daily as needed for Constipation   Yes Yordan Lincoln MD   ZINC PO Take by mouth   Yes Yordan Lincoln MD   memantine ER (NAMENDA XR) 28 MG CP24 extended release capsule Take 1 capsule by mouth daily   Yes Yordan Lincoln MD   lisinopril-hydrochlorothiazide (PRINZIDE;ZESTORETIC) 20-12.5 MG per tablet Take 1 tablet by mouth daily   Yes Yordan Lincoln MD   rivaroxaban (XARELTO) 20 MG TABS tablet Take 1 tablet by mouth daily   Yes Yordan Lincoln MD   cetirizine (ZYRTEC) 10 MG tablet Take 1 tablet by mouth daily   Yes Yordan Lincoln MD   PARoxetine (PAXIL) 10 MG tablet 2 tablets every morning 12/14/15  Yes Yordan Lincoln MD   pravastatin (PRAVACHOL) 40 MG tablet  1/5/16  Yes ProviderYordan MD       Allergies:  Morphine sulfate, Adhesive tape, Gabapentin, Hydrocodone-acetaminophen, Lipitor [atorvastatin], Lyrica [pregabalin], Oxycodone-acetaminophen, Codeine, and Percocet [oxycodone-acetaminophen]    System Neg/Pos Details   Constitutional Negative Fatigue and Fever.   Respiratory Negative Cough and Dyspnea.   Cardio Negative Chest pain.   GI Negative Abdominal pain and Vomiting.    Negative Urinary incontinence.   Neuro Negative Headache.   Psych Negative Psychiatric symptoms.       PHYSICAL EXAM:    General:  Appears stated age, no distress.  Orientation: Advanced Alzheimer's disease, nonverbal  Mood and Affect:  Cooperative and pleasant.  Musculoskeletal: Left shoulder exam: No direct tenderness to palpation.  She allows for passive range of motion of the shoulder joint to 90 degrees forward flexion and abduction without signs of discomfort.  Joint appears stable.  Neurovascular intact.  Skin intact without signs of infection.        Radiology:   XR SHOULDER LEFT (MIN 2 VIEWS)    Result Date: 11/4/2024  AP Internal and AP external rotation views of the left shoulder were obtained

## 2024-11-04 ENCOUNTER — OFFICE VISIT (OUTPATIENT)
Age: 89
End: 2024-11-04

## 2024-11-04 VITALS — BODY MASS INDEX: 29.87 KG/M2 | HEIGHT: 64 IN

## 2024-11-04 DIAGNOSIS — S42.222D CLOSED 2-PART DISPLACED FRACTURE OF SURGICAL NECK OF LEFT HUMERUS WITH ROUTINE HEALING: Primary | ICD-10-CM

## 2024-11-27 ENCOUNTER — TELEPHONE (OUTPATIENT)
Age: 89
End: 2024-11-27

## 2024-11-27 NOTE — TELEPHONE ENCOUNTER
Called patient to let them know to bring the x-ray disk from [facility] for their Monday appointment with Deb Bloom. I spoke with her  and he stated that he doesn't think the nursing home has a disk, just a paper report. I told him that we would see them on Monday.

## 2024-11-27 NOTE — TELEPHONE ENCOUNTER
Please make an appointment with any PA that is available when Dr Bryan is in the office. Please contact patient with appointment details.

## 2024-11-27 NOTE — TELEPHONE ENCOUNTER
Pallavi Gates [birth year]  [id number]  [facility] 11/26/24  Acute fx lt wrist  Nondisplaced  11/24/24  Nspl/ rhonda  Y xray-[facility] Medicare part a & b/ [facility]  [phone]  [facility] [phone]

## 2024-12-02 ENCOUNTER — OFFICE VISIT (OUTPATIENT)
Age: 89
End: 2024-12-02
Payer: MEDICARE

## 2024-12-02 VITALS — BODY MASS INDEX: 29.87 KG/M2 | HEIGHT: 64 IN

## 2024-12-02 DIAGNOSIS — S52.125A CLOSED NONDISPLACED FRACTURE OF HEAD OF LEFT RADIUS, INITIAL ENCOUNTER: ICD-10-CM

## 2024-12-02 DIAGNOSIS — M25.532 LEFT WRIST PAIN: Primary | ICD-10-CM

## 2024-12-02 PROCEDURE — 1159F MED LIST DOCD IN RCRD: CPT | Performed by: NURSE PRACTITIONER

## 2024-12-02 PROCEDURE — 99203 OFFICE O/P NEW LOW 30 MIN: CPT | Performed by: NURSE PRACTITIONER

## 2024-12-02 PROCEDURE — 1123F ACP DISCUSS/DSCN MKR DOCD: CPT | Performed by: NURSE PRACTITIONER

## 2024-12-02 NOTE — PROGRESS NOTES
[facility]  [facility] ORTHOPEDICS  [address]  [facility] [id number] [phone]     Patient: Pallavi Gates   YOB: 1935   Date: 12/2/2024     Chief Complaint   Patient presents with    Left Wrist        History of Present Illness  Pallavi is a right hand dominant 89 y.o. female who presents today with reports of traumatic injury to the left wrist obtained from a fall out of wheelchair 11/24/2024.  As a reminder, patient has a history of advanced Alzheimer's disease.  She had previously been seen on 11/4/2024 for left shoulder fracture that was obtained in early September.  She continues to reside at [facility].  Since last visit pain in the left shoulder has been well-controlled.   states patient has pain to left wrist after most recent fall.       Past Medical History:   Diagnosis Date    Arthritis     Atrial fibrillation (HCC)     Depression     Hyperlipidemia     Hypertension     Osteoarthritis     Osteoporosis     Stroke (HCC) 01/2016    Wears glasses       Past Surgical History:   Procedure Laterality Date    APPENDECTOMY      BREAST BIOPSY      Right-fibrocyst removed    CATARACT REMOVAL      DILATION AND CURETTAGE OF UTERUS      x 2    EYE SURGERY      cataracts    HYSTERECTOMY, VAGINAL      INSERTABLE CARDIAC MONITOR      JOINT REPLACEMENT      left and right    KNEE SURGERY      L replacement    ROTATOR CUFF REPAIR Right 2015    TOTAL KNEE ARTHROPLASTY      right      Social History     Socioeconomic History    Marital status:    Tobacco Use    Smoking status: Never    Smokeless tobacco: Never   Substance and Sexual Activity    Alcohol use: No    Drug use: No     Social Determinants of Health     Food Insecurity: Patient Unable To Answer (9/5/2024)    Received from [facility] Vital Sign     Worried About Running Out of Food in the Last Year: Patient unable to answer     Ran Out of

## 2025-01-27 ENCOUNTER — OFFICE VISIT (OUTPATIENT)
Age: 89
End: 2025-01-27
Payer: MEDICARE

## 2025-01-27 VITALS — BODY MASS INDEX: 29.87 KG/M2 | HEIGHT: 64 IN

## 2025-01-27 DIAGNOSIS — S52.125A CLOSED NONDISPLACED FRACTURE OF HEAD OF LEFT RADIUS, INITIAL ENCOUNTER: ICD-10-CM

## 2025-01-27 DIAGNOSIS — M25.532 LEFT WRIST PAIN: Primary | ICD-10-CM

## 2025-01-27 PROCEDURE — 1090F PRES/ABSN URINE INCON ASSESS: CPT | Performed by: NURSE PRACTITIONER

## 2025-01-27 PROCEDURE — G8419 CALC BMI OUT NRM PARAM NOF/U: HCPCS | Performed by: NURSE PRACTITIONER

## 2025-01-27 PROCEDURE — 99214 OFFICE O/P EST MOD 30 MIN: CPT | Performed by: NURSE PRACTITIONER

## 2025-01-27 PROCEDURE — 1123F ACP DISCUSS/DSCN MKR DOCD: CPT | Performed by: NURSE PRACTITIONER

## 2025-01-27 PROCEDURE — 1036F TOBACCO NON-USER: CPT | Performed by: NURSE PRACTITIONER

## 2025-01-27 PROCEDURE — G8428 CUR MEDS NOT DOCUMENT: HCPCS | Performed by: NURSE PRACTITIONER

## 2025-01-27 NOTE — PROGRESS NOTES
[facility]  [facility]  [address]  [facility] [id number] [phone]     Patient: Pallavi Gates   YOB: 1935   Date: 1/27/2025     Chief Complaint   Patient presents with    Left wrist        History of Present Illness  Pallavi is a right hand dominant 89 y.o. female who presents today for follow-up of traumatic injury to the left wrist obtained from a fall out of wheelchair 11/24/2024.  As a reminder, patient has a history of advanced Alzheimer's disease.  She had previously been seen on 11/4/2024 for left shoulder fracture that was obtained in early September.  She continues to reside at [facility].  Since last visit pain in the left shoulder as well as the left wrist has been well-controlled.   reports no interval fall or injury.    Past Medical History:   Diagnosis Date    Arthritis     Atrial fibrillation (HCC)     Depression     Hyperlipidemia     Hypertension     Osteoarthritis     Osteoporosis     Stroke (HCC) 01/2016    Wears glasses       Past Surgical History:   Procedure Laterality Date    APPENDECTOMY      BREAST BIOPSY      Right-fibrocyst removed    CATARACT REMOVAL      DILATION AND CURETTAGE OF UTERUS      x 2    EYE SURGERY      cataracts    HYSTERECTOMY, VAGINAL      INSERTABLE CARDIAC MONITOR      JOINT REPLACEMENT      left and right    KNEE SURGERY      L replacement    ROTATOR CUFF REPAIR Right 2015    TOTAL KNEE ARTHROPLASTY      right      Social History     Socioeconomic History    Marital status:      Spouse name: None    Number of children: None    Years of education: None    Highest education level: None   Tobacco Use    Smoking status: Never    Smokeless tobacco: Never   Substance and Sexual Activity    Alcohol use: No    Drug use: No     Social Determinants of Health     Food Insecurity: Patient Unable To Answer (9/5/2024)    Received from [facility]

## 2025-02-15 ENCOUNTER — HOSPITAL ENCOUNTER (OUTPATIENT)
Facility: HOSPITAL | Age: 89
Discharge: NURSING, REHAB OR LONG-TERM CARE | End: 2025-02-18
Attending: EMERGENCY MEDICINE | Admitting: STUDENT IN AN ORGANIZED HEALTH CARE EDUCATION/TRAINING PROGRAM
Payer: MEDICARE

## 2025-02-15 DIAGNOSIS — K92.2 GASTROINTESTINAL HEMORRHAGE, UNSPECIFIED GASTROINTESTINAL HEMORRHAGE TYPE: Primary | ICD-10-CM

## 2025-02-15 DIAGNOSIS — Z74.09 IMPAIRED FUNCTIONAL MOBILITY AND ACTIVITY TOLERANCE: ICD-10-CM

## 2025-02-15 DIAGNOSIS — Z79.01 CHRONIC ANTICOAGULATION: ICD-10-CM

## 2025-02-15 DIAGNOSIS — R13.10 DYSPHAGIA, UNSPECIFIED TYPE: ICD-10-CM

## 2025-02-15 LAB
ABO GROUP BLD: NORMAL
ALBUMIN SERPL-MCNC: 3.6 G/DL (ref 3.5–5.2)
ALBUMIN/GLOB SERPL: 1.3 G/DL
ALP SERPL-CCNC: 60 U/L (ref 39–117)
ALT SERPL W P-5'-P-CCNC: 20 U/L (ref 1–33)
ANION GAP SERPL CALCULATED.3IONS-SCNC: 15 MMOL/L (ref 5–15)
AST SERPL-CCNC: 26 U/L (ref 1–32)
BACTERIA UR QL AUTO: ABNORMAL /HPF
BASOPHILS # BLD AUTO: 0.01 10*3/MM3 (ref 0–0.2)
BASOPHILS NFR BLD AUTO: 0.1 % (ref 0–1.5)
BILIRUB SERPL-MCNC: 0.3 MG/DL (ref 0–1.2)
BILIRUB UR QL STRIP: ABNORMAL
BLD GP AB SCN SERPL QL: NEGATIVE
BUN SERPL-MCNC: 52 MG/DL (ref 8–23)
BUN/CREAT SERPL: 92.9 (ref 7–25)
CALCIUM SPEC-SCNC: 8.7 MG/DL (ref 8.6–10.5)
CHLORIDE SERPL-SCNC: 106 MMOL/L (ref 98–107)
CLARITY UR: CLEAR
CO2 SERPL-SCNC: 23 MMOL/L (ref 22–29)
COLOR UR: ABNORMAL
CREAT SERPL-MCNC: 0.56 MG/DL (ref 0.57–1)
DEPRECATED RDW RBC AUTO: 50.3 FL (ref 37–54)
EGFRCR SERPLBLD CKD-EPI 2021: 88.5 ML/MIN/1.73
EOSINOPHIL # BLD AUTO: 0 10*3/MM3 (ref 0–0.4)
EOSINOPHIL NFR BLD AUTO: 0 % (ref 0.3–6.2)
ERYTHROCYTE [DISTWIDTH] IN BLOOD BY AUTOMATED COUNT: 14.6 % (ref 12.3–15.4)
GLOBULIN UR ELPH-MCNC: 2.7 GM/DL
GLUCOSE SERPL-MCNC: 233 MG/DL (ref 65–99)
GLUCOSE UR STRIP-MCNC: ABNORMAL MG/DL
HCT VFR BLD AUTO: 37 % (ref 34–46.6)
HGB BLD-MCNC: 11.5 G/DL (ref 12–15.9)
HGB UR QL STRIP.AUTO: NEGATIVE
HYALINE CASTS UR QL AUTO: ABNORMAL /LPF
IMM GRANULOCYTES # BLD AUTO: 0.04 10*3/MM3 (ref 0–0.05)
IMM GRANULOCYTES NFR BLD AUTO: 0.4 % (ref 0–0.5)
INR PPP: 2.26 (ref 0.91–1.09)
KETONES UR QL STRIP: ABNORMAL
LEUKOCYTE ESTERASE UR QL STRIP.AUTO: ABNORMAL
LYMPHOCYTES # BLD AUTO: 1.26 10*3/MM3 (ref 0.7–3.1)
LYMPHOCYTES NFR BLD AUTO: 12.3 % (ref 19.6–45.3)
MAGNESIUM SERPL-MCNC: 1.8 MG/DL (ref 1.6–2.4)
MCH RBC QN AUTO: 29 PG (ref 26.6–33)
MCHC RBC AUTO-ENTMCNC: 31.1 G/DL (ref 31.5–35.7)
MCV RBC AUTO: 93.2 FL (ref 79–97)
MONOCYTES # BLD AUTO: 0.48 10*3/MM3 (ref 0.1–0.9)
MONOCYTES NFR BLD AUTO: 4.7 % (ref 5–12)
MUCOUS THREADS URNS QL MICRO: ABNORMAL /HPF
NEUTROPHILS NFR BLD AUTO: 8.42 10*3/MM3 (ref 1.7–7)
NEUTROPHILS NFR BLD AUTO: 82.5 % (ref 42.7–76)
NITRITE UR QL STRIP: NEGATIVE
NRBC BLD AUTO-RTO: 0 /100 WBC (ref 0–0.2)
PH UR STRIP.AUTO: 5.5 [PH] (ref 5–8)
PLATELET # BLD AUTO: 237 10*3/MM3 (ref 140–450)
PMV BLD AUTO: 11.4 FL (ref 6–12)
POTASSIUM SERPL-SCNC: 3 MMOL/L (ref 3.5–5.2)
PROT SERPL-MCNC: 6.3 G/DL (ref 6–8.5)
PROT UR QL STRIP: ABNORMAL
PROTHROMBIN TIME: 26.3 SECONDS (ref 11.8–14.8)
RBC # BLD AUTO: 3.97 10*6/MM3 (ref 3.77–5.28)
RBC # UR STRIP: ABNORMAL /HPF
REF LAB TEST METHOD: ABNORMAL
RH BLD: POSITIVE
SODIUM SERPL-SCNC: 144 MMOL/L (ref 136–145)
SP GR UR STRIP: >=1.03 (ref 1–1.03)
SQUAMOUS #/AREA URNS HPF: ABNORMAL /HPF
T&S EXPIRATION DATE: NORMAL
UROBILINOGEN UR QL STRIP: ABNORMAL
WBC # UR STRIP: ABNORMAL /HPF
WBC NRBC COR # BLD AUTO: 10.21 10*3/MM3 (ref 3.4–10.8)

## 2025-02-15 PROCEDURE — 25010000002 POTASSIUM CHLORIDE 10 MEQ/100ML SOLUTION: Performed by: EMERGENCY MEDICINE

## 2025-02-15 PROCEDURE — 86850 RBC ANTIBODY SCREEN: CPT | Performed by: EMERGENCY MEDICINE

## 2025-02-15 PROCEDURE — 86922 COMPATIBILITY TEST ANTIGLOB: CPT

## 2025-02-15 PROCEDURE — 85025 COMPLETE CBC W/AUTO DIFF WBC: CPT | Performed by: EMERGENCY MEDICINE

## 2025-02-15 PROCEDURE — G0378 HOSPITAL OBSERVATION PER HR: HCPCS

## 2025-02-15 PROCEDURE — 86902 BLOOD TYPE ANTIGEN DONOR EA: CPT

## 2025-02-15 PROCEDURE — 86901 BLOOD TYPING SEROLOGIC RH(D): CPT | Performed by: EMERGENCY MEDICINE

## 2025-02-15 PROCEDURE — P9612 CATHETERIZE FOR URINE SPEC: HCPCS

## 2025-02-15 PROCEDURE — 86900 BLOOD TYPING SEROLOGIC ABO: CPT | Performed by: EMERGENCY MEDICINE

## 2025-02-15 PROCEDURE — 80053 COMPREHEN METABOLIC PANEL: CPT | Performed by: EMERGENCY MEDICINE

## 2025-02-15 PROCEDURE — 25810000003 LACTATED RINGERS PER 1000 ML: Performed by: INTERNAL MEDICINE

## 2025-02-15 PROCEDURE — 85610 PROTHROMBIN TIME: CPT | Performed by: EMERGENCY MEDICINE

## 2025-02-15 PROCEDURE — 36415 COLL VENOUS BLD VENIPUNCTURE: CPT

## 2025-02-15 PROCEDURE — 96365 THER/PROPH/DIAG IV INF INIT: CPT

## 2025-02-15 PROCEDURE — 81001 URINALYSIS AUTO W/SCOPE: CPT | Performed by: EMERGENCY MEDICINE

## 2025-02-15 PROCEDURE — 96375 TX/PRO/DX INJ NEW DRUG ADDON: CPT

## 2025-02-15 PROCEDURE — 96376 TX/PRO/DX INJ SAME DRUG ADON: CPT

## 2025-02-15 PROCEDURE — 83735 ASSAY OF MAGNESIUM: CPT | Performed by: EMERGENCY MEDICINE

## 2025-02-15 PROCEDURE — 25010000002 ONDANSETRON PER 1 MG: Performed by: EMERGENCY MEDICINE

## 2025-02-15 PROCEDURE — 86920 COMPATIBILITY TEST SPIN: CPT

## 2025-02-15 PROCEDURE — 99285 EMERGENCY DEPT VISIT HI MDM: CPT

## 2025-02-15 RX ORDER — SODIUM CHLORIDE 0.9 % (FLUSH) 0.9 %
10 SYRINGE (ML) INJECTION EVERY 12 HOURS SCHEDULED
Status: DISCONTINUED | OUTPATIENT
Start: 2025-02-15 | End: 2025-02-18 | Stop reason: HOSPADM

## 2025-02-15 RX ORDER — PRAVASTATIN SODIUM 20 MG
40 TABLET ORAL NIGHTLY
Status: DISCONTINUED | OUTPATIENT
Start: 2025-02-15 | End: 2025-02-18 | Stop reason: HOSPADM

## 2025-02-15 RX ORDER — SODIUM CHLORIDE, SODIUM LACTATE, POTASSIUM CHLORIDE, CALCIUM CHLORIDE 600; 310; 30; 20 MG/100ML; MG/100ML; MG/100ML; MG/100ML
75 INJECTION, SOLUTION INTRAVENOUS CONTINUOUS
Status: DISPENSED | OUTPATIENT
Start: 2025-02-15 | End: 2025-02-16

## 2025-02-15 RX ORDER — POLYETHYLENE GLYCOL 3350 17 G/17G
17 POWDER, FOR SOLUTION ORAL NIGHTLY
Status: ON HOLD | COMMUNITY
End: 2025-02-16

## 2025-02-15 RX ORDER — CETIRIZINE HYDROCHLORIDE 10 MG/1
10 TABLET ORAL DAILY
Status: DISCONTINUED | OUTPATIENT
Start: 2025-02-16 | End: 2025-02-18 | Stop reason: HOSPADM

## 2025-02-15 RX ORDER — PANTOPRAZOLE SODIUM 40 MG/10ML
40 INJECTION, POWDER, LYOPHILIZED, FOR SOLUTION INTRAVENOUS ONCE
Status: COMPLETED | OUTPATIENT
Start: 2025-02-15 | End: 2025-02-15

## 2025-02-15 RX ORDER — GUAIFENESIN 600 MG/1
1200 TABLET, EXTENDED RELEASE ORAL 2 TIMES DAILY PRN
Status: ON HOLD | COMMUNITY
End: 2025-02-16

## 2025-02-15 RX ORDER — TRAMADOL HYDROCHLORIDE 50 MG/1
50 TABLET ORAL EVERY 6 HOURS PRN
Status: DISCONTINUED | OUTPATIENT
Start: 2025-02-15 | End: 2025-02-18 | Stop reason: HOSPADM

## 2025-02-15 RX ORDER — SODIUM CHLORIDE 0.9 % (FLUSH) 0.9 %
10 SYRINGE (ML) INJECTION AS NEEDED
Status: DISCONTINUED | OUTPATIENT
Start: 2025-02-15 | End: 2025-02-18 | Stop reason: HOSPADM

## 2025-02-15 RX ORDER — MEMANTINE HYDROCHLORIDE 5 MG/1
10 TABLET ORAL EVERY 12 HOURS SCHEDULED
Status: DISCONTINUED | OUTPATIENT
Start: 2025-02-15 | End: 2025-02-18 | Stop reason: HOSPADM

## 2025-02-15 RX ORDER — AMLODIPINE BESYLATE 5 MG/1
5 TABLET ORAL DAILY
Status: DISCONTINUED | OUTPATIENT
Start: 2025-02-15 | End: 2025-02-16

## 2025-02-15 RX ORDER — CALCIUM CARBONATE/VITAMIN D3 600 MG-10
1 TABLET ORAL DAILY
Status: DISCONTINUED | OUTPATIENT
Start: 2025-02-15 | End: 2025-02-15

## 2025-02-15 RX ORDER — HYDROCHLOROTHIAZIDE 25 MG/1
12.5 TABLET ORAL
Status: DISCONTINUED | OUTPATIENT
Start: 2025-02-16 | End: 2025-02-16

## 2025-02-15 RX ORDER — ONDANSETRON 2 MG/ML
4 INJECTION INTRAMUSCULAR; INTRAVENOUS EVERY 6 HOURS PRN
Status: DISCONTINUED | OUTPATIENT
Start: 2025-02-15 | End: 2025-02-18 | Stop reason: HOSPADM

## 2025-02-15 RX ORDER — PAROXETINE 20 MG/1
20 TABLET, FILM COATED ORAL DAILY
Status: DISCONTINUED | OUTPATIENT
Start: 2025-02-16 | End: 2025-02-18 | Stop reason: HOSPADM

## 2025-02-15 RX ORDER — LISINOPRIL 20 MG/1
20 TABLET ORAL
Status: DISCONTINUED | OUTPATIENT
Start: 2025-02-16 | End: 2025-02-16

## 2025-02-15 RX ORDER — ONDANSETRON 4 MG/1
4 TABLET, FILM COATED ORAL EVERY 6 HOURS PRN
Status: ON HOLD | COMMUNITY
End: 2025-02-16

## 2025-02-15 RX ORDER — PANTOPRAZOLE SODIUM 40 MG/10ML
40 INJECTION, POWDER, LYOPHILIZED, FOR SOLUTION INTRAVENOUS EVERY 12 HOURS SCHEDULED
Status: DISCONTINUED | OUTPATIENT
Start: 2025-02-15 | End: 2025-02-18 | Stop reason: HOSPADM

## 2025-02-15 RX ORDER — ONDANSETRON 2 MG/ML
4 INJECTION INTRAMUSCULAR; INTRAVENOUS ONCE
Status: COMPLETED | OUTPATIENT
Start: 2025-02-15 | End: 2025-02-15

## 2025-02-15 RX ORDER — CALCIUM CARBONATE/VITAMIN D3 600 MG-10
1 TABLET ORAL DAILY
Status: DISCONTINUED | OUTPATIENT
Start: 2025-02-16 | End: 2025-02-16

## 2025-02-15 RX ORDER — SODIUM CHLORIDE 9 MG/ML
40 INJECTION, SOLUTION INTRAVENOUS AS NEEDED
Status: DISCONTINUED | OUTPATIENT
Start: 2025-02-15 | End: 2025-02-18 | Stop reason: HOSPADM

## 2025-02-15 RX ORDER — POTASSIUM CHLORIDE 7.45 MG/ML
10 INJECTION INTRAVENOUS ONCE
Status: COMPLETED | OUTPATIENT
Start: 2025-02-15 | End: 2025-02-15

## 2025-02-15 RX ADMIN — Medication 10 ML: at 20:17

## 2025-02-15 RX ADMIN — SODIUM CHLORIDE, SODIUM LACTATE, POTASSIUM CHLORIDE, CALCIUM CHLORIDE 75 ML/HR: 20; 30; 600; 310 INJECTION, SOLUTION INTRAVENOUS at 20:34

## 2025-02-15 RX ADMIN — PANTOPRAZOLE SODIUM 40 MG: 40 INJECTION, POWDER, FOR SOLUTION INTRAVENOUS at 20:17

## 2025-02-15 RX ADMIN — PANTOPRAZOLE SODIUM 40 MG: 40 INJECTION, POWDER, FOR SOLUTION INTRAVENOUS at 18:17

## 2025-02-15 RX ADMIN — ONDANSETRON 4 MG: 2 INJECTION INTRAMUSCULAR; INTRAVENOUS at 15:37

## 2025-02-15 RX ADMIN — POTASSIUM CHLORIDE 10 MEQ: 7.46 INJECTION, SOLUTION INTRAVENOUS at 18:18

## 2025-02-15 NOTE — Clinical Note
Level of Care: Telemetry [5]   Diagnosis: GI bleeding [[id number]]   Admitting Physician: RORY DOAN [[id number]]   Attending Physician: RORY DOAN [[id number]]

## 2025-02-15 NOTE — H&P
[facility] Medicine Services  HISTORY AND PHYSICAL    Date of Admission: 2/15/2025  Primary Care Physician: Rocco Hauser MD    Subjective   Primary Historian: Review of record and son Louie.    Chief Complaint: Concern for upper GI bleed    History of Present Illness  I am asked to admit this 89-year-old woman from nursing facility who carries history of dementia hypertension hyperlipidemia prior stroke, diabetes mellitus.  Patient reportedly had large dark vomiting on her clothing yesterday.  There is another episode of coffee-ground emesis reportedly.  Patient is on Xarelto as per Dr. Toth.    Hemoglobin is 11.5, platelet count 237  Platelet of 3  BUN and creatinine are 52 and 0.56 respectively  Patient has trace leukocyte esterase and bacteriuria with 3-6 epithelial cells on straight cath sample    Patient from my understanding is from nursing home facility.  While at lunch earlier, she had coffee ground emesis.  He said he was doing her laundry (it must have been yesterday) when he noticed some dark spots on her clothing concerning for blood.  Review of Systems   Unable to give any details because of dementia    Past Medical History:   Past Medical History:   Diagnosis Date    Arthritis     Dementia     Dementia     Depression     Diabetes mellitus     DIET CONTROL    Hyperlipidemia     Hypertension     Rectal polyp     Stroke      Past Surgical History:  Past Surgical History:   Procedure Laterality Date    APPENDECTOMY      CATARACT EXTRACTION W/ INTRAOCULAR LENS  IMPLANT, BILATERAL      CHOLECYSTECTOMY WITH INTRAOPERATIVE CHOLANGIOGRAM N/A 10/27/2022    Procedure: CHOLECYSTECTOMY LAPAROSCOPIC;  Surgeon: Mehnaz Maloney MD;  Location: [facility];  Service: General;  Laterality: N/A;    COLONOSCOPY  07/14/2014    diverticulosis    DILATION AND CURETTAGE, DIAGNOSTIC / THERAPEUTIC      HYSTERECTOMY      JOINT REPLACEMENT      KNEES    KNEE SURGERY Bilateral     X2     KYPHOPLASTY N/A 7/11/2019    Procedure: KYPHOPLASTY, Lumbar 1;  Surgeon: Marco Sandy MD;  Location: [facility] OR;  Service: Neurosurgery    OOPHORECTOMY      ROTATOR CUFF REPAIR Right      Social History:  reports that she has never smoked. She has never used smokeless tobacco. She reports that she does not drink alcohol and does not use drugs.    Family History: family history includes Colon cancer in her paternal grandmother; No Known Problems in her father and mother.       Allergies:  Allergies   Allergen Reactions    Adhesive Tape Rash    Atorvastatin Myalgia    Gabapentin Mental Status Change    Pregabalin Mental Status Change    Codeine Rash    Lortab [Hydrocodone-Acetaminophen] Rash    Morphine And Codeine Rash    Morphine Sulfate Rash    Oxycodone-Acetaminophen Rash    Percocet [Oxycodone-Acetaminophen] Rash       Medications:  Prior to Admission medications    Medication Sig Start Date End Date Taking? Authorizing Provider   acetaminophen (TYLENOL) 650 MG 8 hr tablet Take 650 mg by mouth Every 8 (Eight) Hours As Needed for Mild Pain .    Odalis Mirza MD   amLODIPine (NORVASC) 5 MG tablet Take 5 mg by mouth Daily.    Odalis Mirza MD   Calcium Carbonate-Vit D-Min (CALCIUM 600+D3 PLUS MINERALS) 600-800 MG-UNIT tablet Take 1 tablet by mouth Daily.    Odalis Mirza MD   cetirizine (ZyrTEC) 10 MG tablet Take 10 mg by mouth Daily.    Odalis Mirza MD   diclofenac (VOLTAREN) 1 % gel gel Apply 4 g topically to the appropriate area as directed Daily As Needed (Knee pain).    Odalis Mirza MD   docusate sodium (COLACE) 250 MG capsule Take 250 mg by mouth Daily.    Odalis Mirza MD   famotidine (PEPCID) 20 MG tablet Take 1 tablet by mouth 2 (Two) Times a Day. 7/27/22   Odalis Mirza MD   lisinopril-hydrochlorothiazide (PRINZIDE,ZESTORETIC) 20-12.5 MG per tablet Take 1 tablet by mouth Daily.    Odalis Mirza MD   memantine (NAMENDA XR) 28 MG  "capsule sustained-release 24 hr extended release capsule Take 28 mg by mouth Daily.    Odalis Miraz MD   Multiple Vitamin (MULTI-VITAMIN PO) Take 1 tablet by mouth Daily.    Odalis Mirza MD   Multiple Vitamins-Minerals (ZINC PO) Take 50 mg by mouth Daily.    Odalis Mirza MD   Omega-3 Fatty Acids (FISH OIL) 1000 MG capsule capsule Take 1,000 mg by mouth Daily With Breakfast.    Odalis Mirza MD   pantoprazole (PROTONIX) 40 MG EC tablet Take 1 tablet by mouth Daily. 7/10/19   Odalis Mirza MD   PARoxetine (PAXIL) 20 MG tablet Take 20 mg by mouth Daily.    Odalis Mirza MD   pravastatin (PRAVACHOL) 40 MG tablet Take 40 mg by mouth Every Night.    Odalis Mirza MD   Pumpkin Seed-Soy Germ (AZO BLADDER CONTROL/GO-LESS PO) Take 2 capsules by mouth Daily.    Odalis Mirza MD   rivaroxaban (XARELTO) 20 MG tablet Take 1 tablet by mouth Daily. 10/12/16   Trevor Sanchez MD   traMADol (ULTRAM) 50 MG tablet Take 1 tablet by mouth Every 6 (Six) Hours As Needed for Moderate Pain. 10/28/22   Mehnaz Maloney MD     I have utilized all available immediate resources to obtain, update, or review the patient's current medications (including all prescriptions, over-the-counter products, herbals, cannabis/cannabidiol products, and vitamin/mineral/dietary (nutritional) supplements).    Objective     Vital Signs: /74 (BP Location: Left arm, Patient Position: Lying)   Pulse 97   Temp 98.2 °F (36.8 °C) (Oral)   Resp 18   Ht 154.9 cm (61\")   Wt 66 kg (145 lb 9.6 oz)   SpO2 98%   BMI 27.51 kg/m²   Physical Exam   I was able to get the small smile in her face which according to son is rare.  Patient was nonverbal at all during visit  GEN: Awake, alert, interactive, in NAD  HEENT: Atraumatic, PERRLA, EOMI, Anicteric, Trachea midline  Lungs: CTAB, no wheezing/rales/rhonchi  Heart: For the most part, the heart rate seems to be regular with occasional earlier beats.  ABD: " soft, nt/nd, +BS, no guarding/rebound  Extremities: atraumatic, no cyanosis, no edema  Skin: no rashes or lesions  Neuro: Did not follow commands, mumbled some words.  Spontaneous eye opening    Psych: Flat affect      Results Reviewed:  Lab Results (last 24 hours)       Procedure Component Value Units Date/Time    Urinalysis With Culture If Indicated - Straight Cath [[id number]]  (Abnormal) Collected: 02/15/25 1609    Specimen: Urine from Straight Cath Updated: 02/15/25 1644     Color, UA Dark Yellow     Appearance, UA Clear     pH, UA 5.5     Specific Gravity, UA >=1.030     Glucose,  mg/dL (Trace)     Ketones, UA Trace     Bilirubin, UA Small (1+)     Blood, UA Negative     Protein,  mg/dL (2+)     Leuk Esterase, UA Trace     Nitrite, UA Negative     Urobilinogen, UA 2.0 E.U./dL    Narrative:      In absence of clinical symptoms, the presence of pyuria, bacteria, and/or nitrites on the urinalysis result does not correlate with infection.    Urinalysis, Microscopic Only - Straight Cath [[id number]]  (Abnormal) Collected: 02/15/25 1609    Specimen: Urine from Straight Cath Updated: 02/15/25 1644     RBC, UA 0-2 /HPF      WBC, UA 3-5 /HPF      Comment: Urine culture not indicated.        Bacteria, UA Trace /HPF      Squamous Epithelial Cells, UA 3-6 /HPF      Hyaline Casts, UA None Seen /LPF      Mucus, UA Small/1+ /HPF      Methodology Manual Light Microscopy    Magnesium [[id number]]  (Normal) Collected: 02/15/25 1522    Specimen: Blood from Arm, Right Updated: 02/15/25 1641     Magnesium 1.8 mg/dL     Comprehensive Metabolic Panel [[id number]]  (Abnormal) Collected: 02/15/25 1522    Specimen: Blood from Arm, Right Updated: 02/15/25 1613     Glucose 233 mg/dL      BUN 52 mg/dL      Creatinine 0.56 mg/dL      Sodium 144 mmol/L      Potassium 3.0 mmol/L      Chloride 106 mmol/L      CO2 23.0 mmol/L      Calcium 8.7 mg/dL      Total Protein 6.3 g/dL      Albumin 3.6 g/dL      ALT (SGPT) 20 U/L      AST (SGOT)  26 U/L      Alkaline Phosphatase 60 U/L      Total Bilirubin 0.3 mg/dL      Globulin 2.7 gm/dL      A/G Ratio 1.3 g/dL      BUN/Creatinine Ratio 92.9     Anion Gap 15.0 mmol/L      eGFR 88.5 mL/min/1.73     Narrative:      GFR Categories in Chronic Kidney Disease (CKD)      GFR Category          GFR (mL/min/1.73)    Interpretation  G1                     90 or greater         Normal or high (1)  G2                      60-89                Mild decrease (1)  G3a                   45-59                Mild to moderate decrease  G3b                   30-44                Moderate to severe decrease  G4                    15-29                Severe decrease  G5                    14 or less           Kidney failure          (1)In the absence of evidence of kidney disease, neither GFR category G1 or G2 fulfill the criteria for CKD.    eGFR calculation 2021 CKD-EPI creatinine equation, which does not include race as a factor    Protime-INR [[id number]]  (Abnormal) Collected: 02/15/25 1522    Specimen: Blood from Arm, Right Updated: 02/15/25 1558     Protime 26.3 Seconds      INR 2.26    CBC & Differential [[id number]]  (Abnormal) Collected: 02/15/25 1522    Specimen: Blood from Arm, Right Updated: 02/15/25 1549    Narrative:      The following orders were created for panel order CBC & Differential.  Procedure                               Abnormality         Status                     ---------                               -----------         ------                     CBC Auto Differential[[id number]]        Abnormal            Final result                 Please view results for these tests on the individual orders.    CBC Auto Differential [[id number]]  (Abnormal) Collected: 02/15/25 1522    Specimen: Blood from Arm, Right Updated: 02/15/25 1549     WBC 10.21 10*3/mm3      RBC 3.97 10*6/mm3      Hemoglobin 11.5 g/dL      Hematocrit 37.0 %      MCV 93.2 fL      MCH 29.0 pg      MCHC 31.1 g/dL      RDW 14.6 %      RDW-SD  50.3 fl      MPV 11.4 fL      Platelets 237 10*3/mm3      Neutrophil % 82.5 %      Lymphocyte % 12.3 %      Monocyte % 4.7 %      Eosinophil % 0.0 %      Basophil % 0.1 %      Immature Grans % 0.4 %      Neutrophils, Absolute 8.42 10*3/mm3      Lymphocytes, Absolute 1.26 10*3/mm3      Monocytes, Absolute 0.48 10*3/mm3      Eosinophils, Absolute 0.00 10*3/mm3      Basophils, Absolute 0.01 10*3/mm3      Immature Grans, Absolute 0.04 10*3/mm3      nRBC 0.0 /100 WBC           Imaging Results (Last 24 Hours)       ** No results found for the last 24 hours. **          I have personally reviewed and interpreted the radiology studies and ECG obtained at time of admission.     Assessment / Plan   Assessment:   Active Hospital Problems    Diagnosis     **GI bleeding            Medical Decision Making  Number and Complexity of problems:   Concern for GI bleeding-coffee-ground emesis, elevated BUN  Hypertension  Hyperlipidemia  History of A-fib with prior stroke; diet-controlled diabetes    Treatment Plan  The patient will be admitted to my service here at [facility].  Clear liquid now  N.p.o. after midnight  PPI IV twice daily  Monitor for any active bleeding  Transfuse as needed for hemoglobin less than 7 or hemoglobin less than 8 if with symptoms of anemia  Hold anticoagulant  SCD for DVT prophylaxis    Conditions and Status  Fair     MDM Data  External documents reviewed: Reviewed epic records; she had colonoscopy with back in 2014.  No EGD record available.  Cardiac tracing (EKG, telemetry) interpretation: No current EKG but EKG from October 20, 2022 was normal sinus rhythm  Radiology interpretation: None available.  None currently indicated    Labs reviewed: Yes  Any tests that were considered but not ordered: None     Decision rules/scores evaluated (example [id number], Wells, etc): CHADS2 vasc  score would be at least 2     Discussed with: Son     Care Planning  Shared decision making: Son  Code status  and discussions: Full code    Disposition  Social Determinants of Health that impact treatment or disposition: Nursing home resident  Estimated length of stay is TBD.     I confirmed that the patient's advanced care plan is present, code status is documented, and a surrogate decision maker is listed in the patient's medical record.     The patient's surrogate decision maker is spouse.     The patient was seen and examined by me on   Electronically signed by Daljit Vasquez MD, 02/15/25, 17:48 CST.

## 2025-02-15 NOTE — ED PROVIDER NOTES
Subjective   History of Present Illness  Hematemesis    History provided by:  EMS personnel and nursing home  History limited by:  Dementia  Vomiting  The primary symptoms include vomiting. Primary symptoms do not include weight loss, fatigue, nausea, diarrhea, melena or hematochezia. The illness began yesterday.   The illness does not include chills, anorexia, bloating or constipation. Associated medical issues do not include inflammatory bowel disease or GERD.       Review of Systems   Unable to perform ROS: Dementia   Constitutional:  Negative for chills, fatigue and weight loss.   Gastrointestinal:  Positive for vomiting. Negative for anorexia, bloating, constipation, diarrhea, hematochezia, melena and nausea.       Past Medical History:   Diagnosis Date    Arthritis     Dementia     Dementia     Depression     Diabetes mellitus     DIET CONTROL    Hyperlipidemia     Hypertension     Rectal polyp     Stroke        Allergies   Allergen Reactions    Adhesive Tape Rash    Atorvastatin Myalgia    Gabapentin Mental Status Change    Pregabalin Mental Status Change    Codeine Rash    Lortab [Hydrocodone-Acetaminophen] Rash    Morphine And Codeine Rash    Morphine Sulfate Rash    Oxycodone-Acetaminophen Rash    Percocet [Oxycodone-Acetaminophen] Rash       Past Surgical History:   Procedure Laterality Date    APPENDECTOMY      CATARACT EXTRACTION W/ INTRAOCULAR LENS  IMPLANT, BILATERAL      CHOLECYSTECTOMY WITH INTRAOPERATIVE CHOLANGIOGRAM N/A 10/27/2022    Procedure: CHOLECYSTECTOMY LAPAROSCOPIC;  Surgeon: Mehnaz Maloney MD;  Location: [facility] OR;  Service: General;  Laterality: N/A;    COLONOSCOPY  07/14/2014    diverticulosis    DILATION AND CURETTAGE, DIAGNOSTIC / THERAPEUTIC      HYSTERECTOMY      JOINT REPLACEMENT      KNEES    KNEE SURGERY Bilateral     X2    KYPHOPLASTY N/A 7/11/2019    Procedure: KYPHOPLASTY, Lumbar 1;  Surgeon: Marco Sandy MD;  Location:  [facility] OR;  Service: Neurosurgery     OOPHORECTOMY      ROTATOR CUFF REPAIR Right        Family History   Problem Relation Age of Onset    No Known Problems Mother     No Known Problems Father     Colon cancer Paternal Grandmother        Social History     Socioeconomic History    Marital status:    Tobacco Use    Smoking status: Never    Smokeless tobacco: Never   Vaping Use    Vaping status: Never Used   Substance and Sexual Activity    Alcohol use: No    Drug use: No    Sexual activity: Defer           Objective   Physical Exam  Vitals and nursing note reviewed. Exam conducted with a chaperone present.   Constitutional:       General: She is awake.      Appearance: Normal appearance. She is well-developed. She is not ill-appearing.   HENT:      Head: Normocephalic and atraumatic.   Eyes:      General: Lids are normal.      Conjunctiva/sclera: Conjunctivae normal.      Pupils: Pupils are equal, round, and reactive to light.   Cardiovascular:      Rate and Rhythm: Normal rate and regular rhythm.      Chest Wall: PMI is not displaced.      Pulses: Normal pulses.      Heart sounds: Normal heart sounds.      No gallop.   Pulmonary:      Effort: Pulmonary effort is normal.      Breath sounds: Normal breath sounds. No decreased breath sounds.   Abdominal:      General: Abdomen is flat. Bowel sounds are normal.      Palpations: Abdomen is soft.      Tenderness: There is no abdominal tenderness.   Genitourinary:     Rectum: Guaiac result negative.   Musculoskeletal:         General: Normal range of motion.      Cervical back: Full passive range of motion without pain, normal range of motion and neck supple. No rigidity.   Skin:     General: Skin is warm and dry.      Capillary Refill: Capillary refill takes less than 2 seconds.   Neurological:      General: No focal deficit present.      Mental Status: She is alert. Mental status is at baseline. She is disoriented and confused.      Cranial Nerves: Cranial nerves 2-12 are intact. No cranial nerve  deficit.      Motor: Motor function is intact.      Deep Tendon Reflexes: Reflexes are normal and symmetric.   Psychiatric:         Behavior: Behavior is cooperative.         Procedures           ED Course  ED Course as of 02/15/25 1733   Sat Feb 15, 2025   1728 This patient came into the ED with history of hematemesis which was noted by the nursing home staff.  Her son states that a couple of days ago he had noticed some darkish colored vomit on her close.  But he did not pay much attention to it.  Today is in the ER hemoglobin is more or less at his baseline but her BUN is elevated if she was not on anticoagulation we probably could have discharged home keeping consideration that she is on anticoagulation was admitted to the medicine service for further evaluation assessment.  To repeat H&H etc.  Does not have any evidence of UTI at this time. [TS]      ED Course User Index  [TS] Sonny Toth MD                                                       Medical Decision Making  Patient with episode of vomiting on anticoagulation.    Problems Addressed:  Chronic anticoagulation: complicated acute illness or injury  Gastrointestinal hemorrhage, unspecified gastrointestinal hemorrhage type: complicated acute illness or injury    Amount and/or Complexity of Data Reviewed  Labs: ordered.     Details: Labs reviewed  Discussion of management or test interpretation with external provider(s): Cussed with the hospital    Risk  Prescription drug management.  Decision regarding hospitalization.  Risk Details: May have had a GI bleed will watch with repeat hemoglobins that the patient is on Xarelto will hold off anticoagulation I have given her some Protonix and see how she does.        Final diagnoses:   Gastrointestinal hemorrhage, unspecified gastrointestinal hemorrhage type   Chronic anticoagulation       ED Disposition  ED Disposition       ED Disposition   Decision to Admit    Condition   --    Comment   Level of Care:  Telemetry [5]   Diagnosis: GI bleeding [083081]   Admitting Physician: RORY DOAN [[id number]]   Attending Physician: RORY DOAN [[id number]]                 No follow-up provider specified.       Medication List      No changes were made to your prescriptions during this visit.            Sonny Toth MD  02/15/25 2633

## 2025-02-16 LAB
ANION GAP SERPL CALCULATED.3IONS-SCNC: 8 MMOL/L (ref 5–15)
BUN SERPL-MCNC: 59 MG/DL (ref 8–23)
BUN/CREAT SERPL: 109.3 (ref 7–25)
CALCIUM SPEC-SCNC: 8.1 MG/DL (ref 8.6–10.5)
CHLORIDE SERPL-SCNC: 107 MMOL/L (ref 98–107)
CO2 SERPL-SCNC: 29 MMOL/L (ref 22–29)
CREAT SERPL-MCNC: 0.54 MG/DL (ref 0.57–1)
EGFRCR SERPLBLD CKD-EPI 2021: 89.2 ML/MIN/1.73
GLUCOSE SERPL-MCNC: 120 MG/DL (ref 65–99)
HCT VFR BLD AUTO: 28.7 % (ref 34–46.6)
HCT VFR BLD AUTO: 29.8 % (ref 34–46.6)
HCT VFR BLD AUTO: 31.7 % (ref 34–46.6)
HGB BLD-MCNC: 10.2 G/DL (ref 12–15.9)
HGB BLD-MCNC: 8.8 G/DL (ref 12–15.9)
HGB BLD-MCNC: 9.4 G/DL (ref 12–15.9)
POTASSIUM SERPL-SCNC: 3.2 MMOL/L (ref 3.5–5.2)
POTASSIUM SERPL-SCNC: 3.5 MMOL/L (ref 3.5–5.2)
SODIUM SERPL-SCNC: 144 MMOL/L (ref 136–145)

## 2025-02-16 PROCEDURE — 85014 HEMATOCRIT: CPT | Performed by: INTERNAL MEDICINE

## 2025-02-16 PROCEDURE — 25810000003 LACTATED RINGERS PER 1000 ML: Performed by: INTERNAL MEDICINE

## 2025-02-16 PROCEDURE — 84132 ASSAY OF SERUM POTASSIUM: CPT | Performed by: INTERNAL MEDICINE

## 2025-02-16 PROCEDURE — G0378 HOSPITAL OBSERVATION PER HR: HCPCS

## 2025-02-16 PROCEDURE — 25010000002 POTASSIUM CHLORIDE 10 MEQ/100ML SOLUTION: Performed by: INTERNAL MEDICINE

## 2025-02-16 PROCEDURE — 99204 OFFICE O/P NEW MOD 45 MIN: CPT | Performed by: INTERNAL MEDICINE

## 2025-02-16 PROCEDURE — 80048 BASIC METABOLIC PNL TOTAL CA: CPT | Performed by: INTERNAL MEDICINE

## 2025-02-16 PROCEDURE — 85018 HEMOGLOBIN: CPT | Performed by: INTERNAL MEDICINE

## 2025-02-16 PROCEDURE — 96366 THER/PROPH/DIAG IV INF ADDON: CPT

## 2025-02-16 PROCEDURE — 96376 TX/PRO/DX INJ SAME DRUG ADON: CPT

## 2025-02-16 RX ORDER — LISINOPRIL 10 MG/1
10 TABLET ORAL 2 TIMES DAILY
COMMUNITY

## 2025-02-16 RX ORDER — NITROFURANTOIN MACROCRYSTALS 100 MG/1
100 CAPSULE ORAL NIGHTLY
COMMUNITY

## 2025-02-16 RX ORDER — TOLNAFTATE 1 G/100G
1 POWDER TOPICAL 2 TIMES DAILY
Status: ON HOLD | COMMUNITY
End: 2025-02-16

## 2025-02-16 RX ORDER — MEMANTINE HYDROCHLORIDE 10 MG/1
10 TABLET ORAL 2 TIMES DAILY
COMMUNITY

## 2025-02-16 RX ORDER — POTASSIUM CHLORIDE 7.45 MG/ML
10 INJECTION INTRAVENOUS
Status: DISPENSED | OUTPATIENT
Start: 2025-02-17 | End: 2025-02-17

## 2025-02-16 RX ORDER — ZINC SULFATE 50(220)MG
220 CAPSULE ORAL DAILY
COMMUNITY

## 2025-02-16 RX ORDER — POTASSIUM CHLORIDE 7.45 MG/ML
10 INJECTION INTRAVENOUS
Status: COMPLETED | OUTPATIENT
Start: 2025-02-16 | End: 2025-02-17

## 2025-02-16 RX ORDER — ACETAMINOPHEN 325 MG/1
325 TABLET ORAL EVERY 8 HOURS PRN
COMMUNITY

## 2025-02-16 RX ORDER — SODIUM CHLORIDE, SODIUM LACTATE, POTASSIUM CHLORIDE, CALCIUM CHLORIDE 600; 310; 30; 20 MG/100ML; MG/100ML; MG/100ML; MG/100ML
75 INJECTION, SOLUTION INTRAVENOUS CONTINUOUS
Status: DISPENSED | OUTPATIENT
Start: 2025-02-16 | End: 2025-02-17

## 2025-02-16 RX ORDER — BISACODYL 10 MG
10 SUPPOSITORY, RECTAL RECTAL DAILY PRN
COMMUNITY

## 2025-02-16 RX ORDER — LISINOPRIL 10 MG/1
10 TABLET ORAL EVERY 12 HOURS SCHEDULED
Status: DISCONTINUED | OUTPATIENT
Start: 2025-02-16 | End: 2025-02-18 | Stop reason: HOSPADM

## 2025-02-16 RX ADMIN — PANTOPRAZOLE SODIUM 40 MG: 40 INJECTION, POWDER, FOR SOLUTION INTRAVENOUS at 21:20

## 2025-02-16 RX ADMIN — Medication 10 ML: at 21:20

## 2025-02-16 RX ADMIN — PANTOPRAZOLE SODIUM 40 MG: 40 INJECTION, POWDER, FOR SOLUTION INTRAVENOUS at 08:26

## 2025-02-16 RX ADMIN — Medication 10 ML: at 08:42

## 2025-02-16 RX ADMIN — SODIUM CHLORIDE, SODIUM LACTATE, POTASSIUM CHLORIDE, CALCIUM CHLORIDE 75 ML/HR: 20; 30; 600; 310 INJECTION, SOLUTION INTRAVENOUS at 10:36

## 2025-02-16 RX ADMIN — POTASSIUM CHLORIDE 10 MEQ: 7.46 INJECTION, SOLUTION INTRAVENOUS at 17:36

## 2025-02-16 RX ADMIN — POTASSIUM CHLORIDE 10 MEQ: 7.46 INJECTION, SOLUTION INTRAVENOUS at 16:28

## 2025-02-16 RX ADMIN — POTASSIUM CHLORIDE 10 MEQ: 7.46 INJECTION, SOLUTION INTRAVENOUS at 14:25

## 2025-02-16 RX ADMIN — POTASSIUM CHLORIDE 10 MEQ: 7.46 INJECTION, SOLUTION INTRAVENOUS at 15:31

## 2025-02-16 NOTE — ED NOTES
"Nursing report ED to floor  Rupa Hernandez  89 y.o.  female    HPI:   Chief Complaint   Patient presents with    Vomiting       Admitting doctor:   Daljit Vasquez MD    Consulting provider(s):  Consults       No orders found from 1/17/2025 to 2/16/2025.             Admitting diagnosis:   The primary encounter diagnosis was Gastrointestinal hemorrhage, unspecified gastrointestinal hemorrhage type. A diagnosis of Chronic anticoagulation was also pertinent to this visit.    Code status:   Current Code Status       Date Active Code Status Order ID Comments User Context       2/15/2025 1748 CPR (Attempt to Resuscitate) [id number]  Daljit Vasquez MD ED        Question Answer    Code Status (Patient has no pulse and is not breathing) CPR (Attempt to Resuscitate)    Medical Interventions (Patient has pulse or is breathing) Full Support    Level Of Support Discussed With Patient                    Allergies:   Adhesive tape, Atorvastatin, Gabapentin, Pregabalin, Codeine, Lortab [hydrocodone-acetaminophen], Morphine and codeine, Morphine sulfate, Oxycodone-acetaminophen, and Percocet [oxycodone-acetaminophen]    Intake and Output  No intake or output data in the 24 hours ending 02/15/25 1939    Weight:       02/15/25  1426   Weight: 66 kg (145 lb 9.6 oz)       Most recent vitals:   Vitals:    02/15/25 1426 02/15/25 1828 02/15/25 1901 02/15/25 1925   BP: 164/74 163/81 138/93    BP Location: Left arm Left arm     Patient Position: Lying Lying     Pulse: 97 98 84    Resp: 18 18     Temp: 98.2 °F (36.8 °C)   97.9 °F (36.6 °C)   TempSrc: Oral   Oral   SpO2: 98% 97% 98%    Weight: 66 kg (145 lb 9.6 oz)      Height: 154.9 cm (61\")        Oxygen Therapy: .    Active LDAs/IV Access:   Lines, Drains & Airways       Active LDAs       Name Placement date Placement time Site Days    Peripheral IV 02/15/25 1434 Anterior;Left Forearm 02/15/25  1434  Forearm  less than 1                    Labs (abnormal labs have a star): "   Labs Reviewed   COMPREHENSIVE METABOLIC PANEL - Abnormal; Notable for the following components:       Result Value    Glucose 233 (*)     BUN 52 (*)     Creatinine 0.56 (*)     Potassium 3.0 (*)     BUN/Creatinine Ratio 92.9 (*)     All other components within normal limits    Narrative:     GFR Categories in Chronic Kidney Disease (CKD)      GFR Category          GFR (mL/min/1.73)    Interpretation  G1                     90 or greater         Normal or high (1)  G2                      60-89                Mild decrease (1)  G3a                   45-59                Mild to moderate decrease  G3b                   30-44                Moderate to severe decrease  G4                    15-29                Severe decrease  G5                    14 or less           Kidney failure          (1)In the absence of evidence of kidney disease, neither GFR category G1 or G2 fulfill the criteria for CKD.    eGFR calculation 2021 CKD-EPI creatinine equation, which does not include race as a factor   PROTIME-INR - Abnormal; Notable for the following components:    Protime 26.3 (*)     INR 2.26 (*)     All other components within normal limits   URINALYSIS W/ CULTURE IF INDICATED - Abnormal; Notable for the following components:    Color, UA Dark Yellow (*)     Glucose,  mg/dL (Trace) (*)     Ketones, UA Trace (*)     Bilirubin, UA Small (1+) (*)     Protein,  mg/dL (2+) (*)     Leuk Esterase, UA Trace (*)     Urobilinogen, UA 2.0 E.U./dL (*)     All other components within normal limits    Narrative:     In absence of clinical symptoms, the presence of pyuria, bacteria, and/or nitrites on the urinalysis result does not correlate with infection.   CBC WITH AUTO DIFFERENTIAL - Abnormal; Notable for the following components:    Hemoglobin 11.5 (*)     MCHC 31.1 (*)     Neutrophil % 82.5 (*)     Lymphocyte % 12.3 (*)     Monocyte % 4.7 (*)     Eosinophil % 0.0 (*)     Neutrophils, Absolute 8.42 (*)     All other  components within normal limits   URINALYSIS, MICROSCOPIC ONLY - Abnormal; Notable for the following components:    WBC, UA 3-5 (*)     Bacteria, UA Trace (*)     Squamous Epithelial Cells, UA 3-6 (*)     Mucus, UA Small/1+ (*)     All other components within normal limits   MAGNESIUM - Normal   TYPE AND SCREEN   PREPARE RBC   CBC AND DIFFERENTIAL    Narrative:     The following orders were created for panel order CBC & Differential.  Procedure                               Abnormality         Status                     ---------                               -----------         ------                     CBC Auto Differential[[id number]]        Abnormal            Final result                 Please view results for these tests on the individual orders.       Meds given in ED:   Medications   ondansetron (ZOFRAN) injection 4 mg (4 mg Intravenous Given 2/15/25 1537)   pantoprazole (PROTONIX) injection 40 mg (40 mg Intravenous Given 2/15/25 1817)   potassium chloride 10 mEq in 100 mL IVPB (10 mEq Intravenous New Bag 2/15/25 1818)     No current facility-administered medications for this encounter.       NIH Stroke Scale:   N/a    Isolation/Infection(s):  No active isolations   No active infections     COVID Testing  N/a    Nursing report ED to floor:  Mental status: .alert, dementia -mumbles/nods  Ambulatory status: .wheelchair bound  Precautions: .fall    ED nurse phone extentsion- ..  [phone]  Report to Katlyn on 3C via phone.

## 2025-02-16 NOTE — PLAN OF CARE
Goal Outcome Evaluation:           Progress: no change  Outcome Evaluation: Patient aler to self . Turn q 2 . incontient at times. K+ as ordered.

## 2025-02-16 NOTE — PLAN OF CARE
Goal Outcome Evaluation:  Plan of Care Reviewed With: patient         No signs of pain or nausea. Patient oriented to self-only. IVF infusing per order. Turn Q2. Incontinent of urine, voiding. SCDs on.

## 2025-02-16 NOTE — CONSULTS
[facility]  Initial Inpatient Consult Note    Referring Provider: No Known Provider    Date of Admission: 2/15/2025  Date of Service:  02/16/25    Reason for Consultation: Hematemesis    Subjective     History of present illness: 89-year-old nursing home resident patient with dementia, hypertension, hypercholesterolemia, CVA, diabetes, presented with coffee-ground vomitus found in her clothing yesterday, she had 2 episodes, patient currently on Xarelto, her baseline hemoglobin is 11.5 on presentation, currently dropped to 9.4 with an INR of 2.26, presented with normal platelet by high BUN of 52 with normal creatinine  The patient is unable to give history due to dementia    Past Medical History:  Past Medical History:   Diagnosis Date    Arthritis     Dementia     Dementia     Depression     Diabetes mellitus     DIET CONTROL    Hyperlipidemia     Hypertension     Rectal polyp     Stroke        Past Surgical History:  Past Surgical History:   Procedure Laterality Date    APPENDECTOMY      CATARACT EXTRACTION W/ INTRAOCULAR LENS  IMPLANT, BILATERAL      CHOLECYSTECTOMY WITH INTRAOPERATIVE CHOLANGIOGRAM N/A 10/27/2022    Procedure: CHOLECYSTECTOMY LAPAROSCOPIC;  Surgeon: Mehnaz Maloney MD;  Location: [facility] OR;  Service: General;  Laterality: N/A;    COLONOSCOPY  07/14/2014    diverticulosis    DILATION AND CURETTAGE, DIAGNOSTIC / THERAPEUTIC      HYSTERECTOMY      JOINT REPLACEMENT      KNEES    KNEE SURGERY Bilateral     X2    KYPHOPLASTY N/A 7/11/2019    Procedure: KYPHOPLASTY, Lumbar 1;  Surgeon: Marco Sandy MD;  Location: [facility] OR;  Service: Neurosurgery    OOPHORECTOMY      ROTATOR CUFF REPAIR Right         Social History:   Social History     Tobacco Use    Smoking status: Never    Smokeless tobacco: Never   Substance Use Topics    Alcohol use: No        Family History:  Family History   Problem Relation Age of Onset    No Known Problems Mother     No Known Problems Father      Colon cancer Paternal Grandmother        Home Meds:  Medications Prior to Admission   Medication Sig Dispense Refill Last Dose/Taking    acetaminophen (TYLENOL) 650 MG 8 hr tablet Take 1 tablet by mouth Every 8 (Eight) Hours As Needed for Mild Pain.   Taking As Needed    bisacodyl (DULCOLAX) 10 MG suppository Insert 1 suppository into the rectum Daily As Needed for Constipation.   Taking As Needed    cetirizine (ZyrTEC) 10 MG tablet Take 1 tablet by mouth Daily.   Taking    famotidine (PEPCID) 20 MG tablet Take 1 tablet by mouth 2 (Two) Times a Day.   Taking    lisinopril (PRINIVIL,ZESTRIL) 10 MG tablet Take 1 tablet by mouth 2 (Two) Times a Day.   Taking    magnesium hydroxide (MILK OF MAGNESIA) 400 MG/5ML suspension Take 30 mL by mouth Daily As Needed for Constipation.   Taking As Needed    memantine (NAMENDA) 10 MG tablet Take 1 tablet by mouth 2 (Two) Times a Day.   Taking    Multiple Vitamin (MULTI-VITAMIN PO) Take 1 tablet by mouth Daily.   Taking    nitrofurantoin (MACRODANTIN) 100 MG capsule Take 1 capsule by mouth Every Night.   Taking    PARoxetine (PAXIL) 20 MG tablet Take 1 tablet by mouth Daily.   Taking    pravastatin (PRAVACHOL) 40 MG tablet Take 1 tablet by mouth Every Night.   Taking    psyllium (METAMUCIL) 58.6 % packet Take 1 packet by mouth Daily.   Taking    rivaroxaban (XARELTO) 20 MG tablet Take 1 tablet by mouth Daily. 90 tablet 3 Taking    tolnaftate (TINACTIN) 1 % powder Apply 1 Application topically to the appropriate area as directed 2 (Two) Times a Day.   Taking    zinc sulfate (ZINCATE) 220 (50 Zn) MG capsule Take 1 capsule by mouth Daily.   Taking    guaiFENesin (MUCINEX) 600 MG 12 hr tablet Take 2 tablets by mouth 2 (Two) Times a Day As Needed for Cough or Congestion.          Current Meds:     Current Facility-Administered Medications:     Calcium Replacement - Follow Nurse / BPA Driven Protocol, , Not Applicable, PRN, Daljit Vasquez MD    cetirizine (zyrTEC) tablet 10  mg, 10 mg, Oral, Daily, Daljit Vasquez MD    lactated ringers infusion, 75 mL/hr, Intravenous, Continuous, Daljit Vasquez MD, Last Rate: 75 mL/hr at 02/16/25 1036, 75 mL/hr at 02/16/25 1036    lisinopril (PRINIVIL,ZESTRIL) tablet 10 mg, 10 mg, Oral, Q12H **AND** [DISCONTINUED] hydroCHLOROthiazide tablet 12.5 mg, 12.5 mg, Oral, Q24H, Daljit Vasquez MD    Magnesium Standard Dose Replacement - Follow Nurse / BPA Driven Protocol, , Not Applicable, Christina MURPHY Glenn Riego, MD    memantine (NAMENDA) tablet 10 mg, 10 mg, Oral, Q12H, Daljit Vasquez MD    ondansetron (ZOFRAN) injection 4 mg, 4 mg, Intravenous, Q6H PRNChristina Glenn Riego, MD    pantoprazole (PROTONIX) injection 40 mg, 40 mg, Intravenous, Q12H, Daljit Vasquez MD, 40 mg at 02/16/25 0826    PARoxetine (PAXIL) tablet 20 mg, 20 mg, Oral, Daily, Daljit Vasquez MD    Phosphorus Replacement - Follow Nurse / BPA Driven Protocol, , Not Applicable, Christina MURPHY Glenn Riego, MD    potassium chloride 10 mEq in 100 mL IVPB, 10 mEq, Intravenous, Q1H, Daljit Vasquez MD, Last Rate: 100 mL/hr at 02/16/25 1425, 10 mEq at 02/16/25 1425    Potassium Replacement - Follow Nurse / BPA Driven Protocol, , Not Applicable, Christina MURPHY Glenn Riego, MD    pravastatin (PRAVACHOL) tablet 40 mg, 40 mg, Oral, Nightly, Daljit Vasquez MD    sodium chloride 0.9 % flush 10 mL, 10 mL, Intravenous, Q12H, Daljit Vasquez MD, 10 mL at 02/16/25 0842    sodium chloride 0.9 % flush 10 mL, 10 mL, Intravenous, PRN, Daljit Vasquez MD    sodium chloride 0.9 % infusion 40 mL, 40 mL, Intravenous, PRN, Daljti Vasquez MD    traMADol (ULTRAM) tablet 50 mg, 50 mg, Oral, Q6H PRN, Daljit Vasquez MD    Allergies:  Allergies   Allergen Reactions    Adhesive Tape Rash    Atorvastatin Myalgia    Gabapentin Mental Status Change    Pregabalin Mental Status Change     Codeine Rash    Lortab [Hydrocodone-Acetaminophen] Rash    Morphine And Codeine Rash    Morphine Sulfate Rash    Oxycodone-Acetaminophen Rash    Percocet [Oxycodone-Acetaminophen] Rash       Vital Signs  Temp:  [97.9 °F (36.6 °C)-98.7 °F (37.1 °C)] 98.4 °F (36.9 °C)  Heart Rate:  [84-98] 92  Resp:  [14-18] 16  BP: (136-163)/(58-93) 136/58  Body mass index is 27.14 kg/m².    Intake/Output Summary (Last 24 hours) at 2/16/2025 1432  Last data filed at 2/15/2025 1945  Gross per 24 hour   Intake 100 ml   Output --   Net 100 ml     No intake/output data recorded.    Review of Systems     Unable to obtain history      Objective     Physical Exam:  GEN: Awake, alert, interactive, in NAD  HEENT: Atraumatic, PERRLA, EOMI, Anicteric, Trachea midline  Lungs: CTAB, no wheezing/rales/rhonchi  Heart: For the most part, the heart rate seems to be regular with occasional earlier beats.  ABD: soft, nt/nd, +BS, no guarding/rebound  Extremities: atraumatic, no cyanosis, no edema  Skin: no rashes or lesions  Neuro: Did not follow commands, mumbled some words.  Spontaneous eye opening  Psych: Flat affect     Results Review:    I have reviewed all of the patients current test results  Results from last 7 days   Lab Units 02/16/25  1006 02/16/25  0020 02/15/25  1522   WBC 10*3/mm3  --   --  10.21   HEMOGLOBIN g/dL 9.4* 10.2* 11.5*   HEMATOCRIT % 29.8* 31.7* 37.0   PLATELETS 10*3/mm3  --   --  237       Results from last 7 days   Lab Units 02/16/25  1006 02/15/25  1522   SODIUM mmol/L 144 144   POTASSIUM mmol/L 3.2* 3.0*   CHLORIDE mmol/L 107 106   CO2 mmol/L 29.0 23.0   BUN mg/dL 59* 52*   CREATININE mg/dL 0.54* 0.56*   CALCIUM mg/dL 8.1* 8.7   BILIRUBIN mg/dL  --  0.3   ALK PHOS U/L  --  60   ALT (SGPT) U/L  --  20   AST (SGOT) U/L  --  26   GLUCOSE mg/dL 120* 233*       Results from last 7 days   Lab Units 02/15/25  1522   INR  2.26*       Lab Results   Lab Value Date/Time    LIPASE 25 10/20/2022 1456       Radiology:    FL C Arm During  Surgery  EXAMINATION: XR SPINE LUMBAR AP AND LATERAL- 7/11/2019 2:17 PM CDT     HISTORY: Kyphoplasty     FLUOROSCOPY TIME:  1 minute 28 seconds     AIR KERMA: 27.35mGy     NUMBER OF IMAGES: 2     FINDINGS:  Multiple fluoroscopic images are submitted from the OR. A radiologist  was not present for the acquisition or intraoperative interpretation of  these images. Images demonstrate kyphoplasty of the spine. This appears  to be at L1. Please see the operative report for details pertaining to  this exam.  This report was finalized on 07/11/2019 14:17 by Dr. Jorge Amanda MD.  XR Spine Lumbar AP & Lateral  EXAMINATION: XR SPINE LUMBAR AP AND LATERAL- 7/11/2019 2:17 PM CDT     HISTORY: Kyphoplasty     FLUOROSCOPY TIME:  1 minute 28 seconds     AIR KERMA: 27.35mGy     NUMBER OF IMAGES: 2     FINDINGS:  Multiple fluoroscopic images are submitted from the OR. A radiologist  was not present for the acquisition or intraoperative interpretation of  these images. Images demonstrate kyphoplasty of the spine. This appears  to be at L1. Please see the operative report for details pertaining to  this exam.  This report was finalized on 07/11/2019 14:17 by Dr. Jorge Amanda MD.            Assessment & Plan       GI bleeding    GI bleed      Impression/Plan    Hematemesis  Coffee-ground vomiting  Associated with high BUN with normal creatinine  Highly suggestive of upper GI bleeding source  Associated with 2 g drop of hemoglobin since admission  Continue PPI IV twice daily  Continue to monitor hemoglobin  Hold anticoagulant  N.p.o. after midnight for gastroscopy in a.m.  I called the family to obtain consent, left a voice message    Electronically signed by Deric Butterfield MD, 02/16/25, 2:32 PM CST.         Deric Butterfield MD  02/16/25  14:32 CST

## 2025-02-16 NOTE — PROGRESS NOTES
"1         [facility] Medicine Services  INPATIENT PROGRESS NOTE    Patient Name: Rupa Hernandez  Date of Admission: 2/15/2025  Today's Date: 02/16/25  Length of Stay: 0  Primary Care Physician: Rocco Hauser MD    Subjective   Chief Complaint: Follow-up  HPI   Admitted for evaluation of coffee-ground emesis.  Previously was on DOAC for history of A-fib with prior stroke.  Severely demented    No new event  No new complaints  No reported bleeding overnight and from family.        Review of Systems   Unable to participate      Objective    Temp:  [97.9 °F (36.6 °C)-98.7 °F (37.1 °C)] 98.2 °F (36.8 °C)  Heart Rate:  [84-98] 86  Resp:  [14-18] 14  BP: (138-164)/(62-93) 141/83  Physical Exam  Stable appearing  GEN: Awake, alert, very soft words, mouth of her mouth which is very hard to understand in NAD  HEENT: Atraumatic, PERRLA, EOMI, Anicteric, Trachea midline  Lungs: CTAB, no wheezing/rales/rhonchi  Heart: For the most part, S1-S2, irregularly irregular, telemetry showed A-fib rate controlled  ABD: soft, nt/nd, +BS, no guarding/rebound  Extremities: atraumatic, no cyanosis, no edema  Skin: no rashes or lesions  Neuro: Did not follow commands, mumbled some words.  Spontaneous eye opening   Psych: Flat affect         Results Review:  I have reviewed the labs, radiology results, and diagnostic studies.    Laboratory Data:   Results from last 7 days   Lab Units 02/16/25  0020 02/15/25  1522   WBC 10*3/mm3  --  10.21   HEMOGLOBIN g/dL 10.2* 11.5*   HEMATOCRIT % 31.7* 37.0   PLATELETS 10*3/mm3  --  237        Results from last 7 days   Lab Units 02/15/25  1522   SODIUM mmol/L 144   POTASSIUM mmol/L 3.0*   CHLORIDE mmol/L 106   CO2 mmol/L 23.0   BUN mg/dL 52*   CREATININE mg/dL 0.56*   CALCIUM mg/dL 8.7   BILIRUBIN mg/dL 0.3   ALK PHOS U/L 60   ALT (SGPT) U/L 20   AST (SGOT) U/L 26   GLUCOSE mg/dL 233*       Culture Data:   No results found for: \"BLOODCX\", \"URINECX\", \"WOUNDCX\", \"MRSACX\", " "\"RESPCX\", \"STOOLCX\"    Radiology Data:   Imaging Results (Last 24 Hours)       ** No results found for the last 24 hours. **            I have reviewed the patient's current medications.     Assessment/Plan   Assessment  Active Hospital Problems    Diagnosis     **GI bleeding     GI bleed        Medical Decision Making  Number and Complexity of problems:   Concern for GI bleeding-coffee-ground emesis, elevated BUN  Hypertension-continue lisinopril  Hyperlipidemia-continue statin    History of A-fib with prior stroke; diet-controlled diabetes  Hypokalemia replaced.  Will recheck.  Dementia-continue memantine    Treatment Plan   GI consulted  IV PPI twice daily  Transfuse as needed for hemoglobin less than 7 or hemoglobin less than 8 if with symptoms of anemia  Anticoagulation on hold  SCD for DVT prophylaxis  Hemoglobin 10.2 (11.5)  Apprised son of current condition and plan of care  Medications reviewed    cetirizine, 10 mg, Oral, Daily  lisinopril, 10 mg, Oral, Q12H  memantine, 10 mg, Oral, Q12H  pantoprazole, 40 mg, Intravenous, Q12H  PARoxetine, 20 mg, Oral, Daily  pravastatin, 40 mg, Oral, Nightly  sodium chloride, 10 mL, Intravenous, Q12H        Conditions and Status  Fair     MDM Data  External documents reviewed: Reviewed epic records; she had colonoscopy with back in 2014.  No EGD record available.  Cardiac tracing (EKG, telemetry) interpretation: No current EKG but EKG from October 20, 2022 was normal sinus rhythm  Radiology interpretation: None available.  None currently indicated     Labs reviewed: Yes  Any tests that were considered but not ordered: None     Decision rules/scores evaluated (example [id number], Wells, etc): CHADS2 vasc  score would be at least 2     Discussed with: Son     Care Planning  Shared decision making: Son  Code status and discussions: Full code     Disposition  Social Determinants of Health that impact treatment or disposition: Nursing home resident  Estimated length of stay " is TBD.      I confirmed that the patient's advanced care plan is present, code status is documented, and a surrogate decision maker is listed in the patient's medical record.      The patient's surrogate decision maker is spouse.         Electronically signed by Daljit Vasquez MD, 02/16/25, 08:58 CST.

## 2025-02-17 ENCOUNTER — ANESTHESIA EVENT (OUTPATIENT)
Dept: GASTROENTEROLOGY | Facility: HOSPITAL | Age: 89
End: 2025-02-17
Payer: MEDICARE

## 2025-02-17 ENCOUNTER — ANESTHESIA (OUTPATIENT)
Dept: GASTROENTEROLOGY | Facility: HOSPITAL | Age: 89
End: 2025-02-17
Payer: MEDICARE

## 2025-02-17 LAB
ANION GAP SERPL CALCULATED.3IONS-SCNC: 9 MMOL/L (ref 5–15)
BUN SERPL-MCNC: 38 MG/DL (ref 8–23)
BUN/CREAT SERPL: 86.4 (ref 7–25)
CALCIUM SPEC-SCNC: 8.1 MG/DL (ref 8.6–10.5)
CHLORIDE SERPL-SCNC: 109 MMOL/L (ref 98–107)
CO2 SERPL-SCNC: 21 MMOL/L (ref 22–29)
CREAT SERPL-MCNC: 0.44 MG/DL (ref 0.57–1)
DEPRECATED RDW RBC AUTO: 57.1 FL (ref 37–54)
EGFRCR SERPLBLD CKD-EPI 2021: 93.8 ML/MIN/1.73
ERYTHROCYTE [DISTWIDTH] IN BLOOD BY AUTOMATED COUNT: 14.8 % (ref 12.3–15.4)
GLUCOSE SERPL-MCNC: 110 MG/DL (ref 65–99)
HCT VFR BLD AUTO: 29.6 % (ref 34–46.6)
HCT VFR BLD AUTO: 30.7 % (ref 34–46.6)
HCT VFR BLD AUTO: 32.4 % (ref 34–46.6)
HGB BLD-MCNC: 9.2 G/DL (ref 12–15.9)
HGB BLD-MCNC: 9.2 G/DL (ref 12–15.9)
HGB BLD-MCNC: 9.7 G/DL (ref 12–15.9)
MCH RBC QN AUTO: 29.5 PG (ref 26.6–33)
MCHC RBC AUTO-ENTMCNC: 28.4 G/DL (ref 31.5–35.7)
MCV RBC AUTO: 103.8 FL (ref 79–97)
PLATELET # BLD AUTO: 153 10*3/MM3 (ref 140–450)
PMV BLD AUTO: 11.6 FL (ref 6–12)
POTASSIUM SERPL-SCNC: 3.9 MMOL/L (ref 3.5–5.2)
POTASSIUM SERPL-SCNC: 4.5 MMOL/L (ref 3.5–5.2)
RBC # BLD AUTO: 3.12 10*6/MM3 (ref 3.77–5.28)
SODIUM SERPL-SCNC: 139 MMOL/L (ref 136–145)
WBC NRBC COR # BLD AUTO: 9.7 10*3/MM3 (ref 3.4–10.8)

## 2025-02-17 PROCEDURE — 85018 HEMOGLOBIN: CPT | Performed by: STUDENT IN AN ORGANIZED HEALTH CARE EDUCATION/TRAINING PROGRAM

## 2025-02-17 PROCEDURE — 43235 EGD DIAGNOSTIC BRUSH WASH: CPT | Performed by: INTERNAL MEDICINE

## 2025-02-17 PROCEDURE — G0378 HOSPITAL OBSERVATION PER HR: HCPCS

## 2025-02-17 PROCEDURE — 80048 BASIC METABOLIC PNL TOTAL CA: CPT | Performed by: INTERNAL MEDICINE

## 2025-02-17 PROCEDURE — 25010000002 POTASSIUM CHLORIDE 10 MEQ/100ML SOLUTION: Performed by: INTERNAL MEDICINE

## 2025-02-17 PROCEDURE — 97166 OT EVAL MOD COMPLEX 45 MIN: CPT | Performed by: OCCUPATIONAL THERAPIST

## 2025-02-17 PROCEDURE — 63710000001 PRAVASTATIN 20 MG TABLET: Performed by: INTERNAL MEDICINE

## 2025-02-17 PROCEDURE — 92610 EVALUATE SWALLOWING FUNCTION: CPT

## 2025-02-17 PROCEDURE — 84132 ASSAY OF SERUM POTASSIUM: CPT | Performed by: INTERNAL MEDICINE

## 2025-02-17 PROCEDURE — 96376 TX/PRO/DX INJ SAME DRUG ADON: CPT

## 2025-02-17 PROCEDURE — 85014 HEMATOCRIT: CPT | Performed by: STUDENT IN AN ORGANIZED HEALTH CARE EDUCATION/TRAINING PROGRAM

## 2025-02-17 PROCEDURE — 85018 HEMOGLOBIN: CPT | Performed by: INTERNAL MEDICINE

## 2025-02-17 PROCEDURE — 96366 THER/PROPH/DIAG IV INF ADDON: CPT

## 2025-02-17 PROCEDURE — 25810000003 LACTATED RINGERS PER 1000 ML: Performed by: INTERNAL MEDICINE

## 2025-02-17 PROCEDURE — A9270 NON-COVERED ITEM OR SERVICE: HCPCS | Performed by: INTERNAL MEDICINE

## 2025-02-17 PROCEDURE — 85027 COMPLETE CBC AUTOMATED: CPT | Performed by: INTERNAL MEDICINE

## 2025-02-17 PROCEDURE — 63710000001 MEMANTINE 5 MG TABLET: Performed by: INTERNAL MEDICINE

## 2025-02-17 PROCEDURE — 85014 HEMATOCRIT: CPT | Performed by: INTERNAL MEDICINE

## 2025-02-17 PROCEDURE — 63710000001 LISINOPRIL 10 MG TABLET: Performed by: INTERNAL MEDICINE

## 2025-02-17 PROCEDURE — 25010000002 PROPOFOL 1000 MG/100ML EMULSION

## 2025-02-17 PROCEDURE — 97162 PT EVAL MOD COMPLEX 30 MIN: CPT

## 2025-02-17 PROCEDURE — 99214 OFFICE O/P EST MOD 30 MIN: CPT | Performed by: INTERNAL MEDICINE

## 2025-02-17 PROCEDURE — 25010000002 LIDOCAINE (CARDIAC)

## 2025-02-17 RX ORDER — SODIUM CHLORIDE 9 MG/ML
500 INJECTION, SOLUTION INTRAVENOUS ONCE
Status: DISCONTINUED | OUTPATIENT
Start: 2025-02-17 | End: 2025-02-17 | Stop reason: HOSPADM

## 2025-02-17 RX ORDER — LIDOCAINE HYDROCHLORIDE 10 MG/ML
0.5 INJECTION, SOLUTION EPIDURAL; INFILTRATION; INTRACAUDAL; PERINEURAL ONCE AS NEEDED
Status: DISCONTINUED | OUTPATIENT
Start: 2025-02-17 | End: 2025-02-17 | Stop reason: HOSPADM

## 2025-02-17 RX ORDER — SODIUM CHLORIDE 0.9 % (FLUSH) 0.9 %
10 SYRINGE (ML) INJECTION AS NEEDED
Status: DISCONTINUED | OUTPATIENT
Start: 2025-02-17 | End: 2025-02-17 | Stop reason: HOSPADM

## 2025-02-17 RX ORDER — PROPOFOL 10 MG/ML
INJECTION, EMULSION INTRAVENOUS AS NEEDED
Status: DISCONTINUED | OUTPATIENT
Start: 2025-02-17 | End: 2025-02-17 | Stop reason: SURG

## 2025-02-17 RX ORDER — POTASSIUM CHLORIDE 7.45 MG/ML
10 INJECTION INTRAVENOUS ONCE
Status: COMPLETED | OUTPATIENT
Start: 2025-02-17 | End: 2025-02-17

## 2025-02-17 RX ADMIN — MEMANTINE 10 MG: 5 TABLET ORAL at 20:36

## 2025-02-17 RX ADMIN — PANTOPRAZOLE SODIUM 40 MG: 40 INJECTION, POWDER, FOR SOLUTION INTRAVENOUS at 10:44

## 2025-02-17 RX ADMIN — PROPOFOL 10 MG: 10 INJECTION, EMULSION INTRAVENOUS at 12:55

## 2025-02-17 RX ADMIN — POTASSIUM CHLORIDE 10 MEQ: 7.46 INJECTION, SOLUTION INTRAVENOUS at 03:53

## 2025-02-17 RX ADMIN — POTASSIUM CHLORIDE 10 MEQ: 7.46 INJECTION, SOLUTION INTRAVENOUS at 05:44

## 2025-02-17 RX ADMIN — SODIUM CHLORIDE, SODIUM LACTATE, POTASSIUM CHLORIDE, CALCIUM CHLORIDE 75 ML/HR: 20; 30; 600; 310 INJECTION, SOLUTION INTRAVENOUS at 19:33

## 2025-02-17 RX ADMIN — POTASSIUM CHLORIDE 10 MEQ: 7.46 INJECTION, SOLUTION INTRAVENOUS at 02:14

## 2025-02-17 RX ADMIN — LIDOCAINE HYDROCHLORIDE 50 MG: 20 INJECTION, SOLUTION INTRAVENOUS at 12:54

## 2025-02-17 RX ADMIN — PRAVASTATIN SODIUM 40 MG: 20 TABLET ORAL at 20:36

## 2025-02-17 RX ADMIN — Medication 10 ML: at 20:36

## 2025-02-17 RX ADMIN — PANTOPRAZOLE SODIUM 40 MG: 40 INJECTION, POWDER, FOR SOLUTION INTRAVENOUS at 20:36

## 2025-02-17 RX ADMIN — PROPOFOL 30 MG: 10 INJECTION, EMULSION INTRAVENOUS at 12:54

## 2025-02-17 RX ADMIN — SODIUM CHLORIDE, SODIUM LACTATE, POTASSIUM CHLORIDE, CALCIUM CHLORIDE 75 ML/HR: 20; 30; 600; 310 INJECTION, SOLUTION INTRAVENOUS at 00:47

## 2025-02-17 RX ADMIN — POTASSIUM CHLORIDE 10 MEQ: 7.46 INJECTION, SOLUTION INTRAVENOUS at 00:50

## 2025-02-17 RX ADMIN — LISINOPRIL 10 MG: 10 TABLET ORAL at 20:36

## 2025-02-17 NOTE — PLAN OF CARE
Goal Outcome Evaluation:  Plan of Care Reviewed With: patient, spouse        Progress: improving  Outcome Evaluation: Pt disoriented x4. Largely nonverbal but following directions. EGD complete. SLP consult completed. Diet orders placed. Possible discharge to [facility] tomorrow. Safety maintained.

## 2025-02-17 NOTE — PLAN OF CARE
Goal Outcome Evaluation:  Plan of Care Reviewed With: patient, spouse, son        Progress: no change  Outcome Evaluation: OT evaluation completed. Pt is alert and agreeable to therapy. Pt's spouse and son are present intermittently and provide history and PLOF. Pt responds to her name. Pt with impaired command following. Pt with intermittent RUE tremors. BUE AROM WFL, except bilateral shoulders, right shoulder is 75% impaired and left shoulder is 75-90% impaired. Pt was max x1-2 for rolling right and left. Pt was found to bed soiled in stool, dependent for toilet hygiene and clothing management. Pt was increasingly resistive with movement, but remains pleasant throughout session. Skilled OT recommended to address adls, functional mobility and endurance. Recommended d/c SNF.    Anticipated Discharge Disposition (OT): skilled nursing facility

## 2025-02-17 NOTE — PLAN OF CARE
Goal Outcome Evaluation:  Plan of Care Reviewed With: patient      No signs of pain or nausea. No signs of bleeding this shift. Turn Q2. Preventative mepilex in place. SCDs on. IVF infusing per order. Potassium replacement per protocol. Patient NPO. Patient on room air.

## 2025-02-17 NOTE — PROGRESS NOTES
[facility]  Inpatient Progress Note      Date of Admission: 2/15/2025  Date of Service:  02/17/25    Reason for Follow Up: Hematemesis    Subjective     Subjective:   No new complaints  Patient is demented  Gastroscopy today showed medium size hiatal hernia, normal esophagus, normal stomach, no peptic ulcer disease or mass, normal duodenum      Current Facility-Administered Medications:     Calcium Replacement - Follow Nurse / BPA Driven Protocol, , Not Applicable, Christina MURPHY Glenn Riego, MD    cetirizine (zyrTEC) tablet 10 mg, 10 mg, Oral, Daily, Daljit Vasquez MD    lactated ringers infusion, 75 mL/hr, Intravenous, Continuous, Shraddha Echeverria MD, Last Rate: 75 mL/hr at 02/17/25 1252, Currently Infusing at 02/17/25 1252    lisinopril (PRINIVIL,ZESTRIL) tablet 10 mg, 10 mg, Oral, Q12H **AND** [DISCONTINUED] hydroCHLOROthiazide tablet 12.5 mg, 12.5 mg, Oral, Q24H, Daljit Vasquez MD    Magnesium Standard Dose Replacement - Follow Nurse / BPA Driven Protocol, , Not Applicable, Christina MURPHY Glenn Riego, MD    memantine (NAMENDA) tablet 10 mg, 10 mg, Oral, Q12H, Daljit Vasquez MD    ondansetron (ZOFRAN) injection 4 mg, 4 mg, Intravenous, Q6H Christina MURPHY Glenn Riego, MD    pantoprazole (PROTONIX) injection 40 mg, 40 mg, Intravenous, Q12H, Daljit Vasquez MD, 40 mg at 02/17/25 1044    PARoxetine (PAXIL) tablet 20 mg, 20 mg, Oral, Daily, Daljit Vasquez MD    Phosphorus Replacement - Follow Nurse / BPA Driven Protocol, , Not Applicable, Christina MURPHY Glenn Riego, MD    Potassium Replacement - Follow Nurse / BPA Driven Protocol, , Not Applicable, Christina MURPHY Glenn Riego, MD    pravastatin (PRAVACHOL) tablet 40 mg, 40 mg, Oral, Nightly, Daljit Vasquez MD    sodium chloride 0.9 % flush 10 mL, 10 mL, Intravenous, Q12H, Daljit Vasquez MD, 10 mL at 02/16/25 2120    sodium chloride 0.9 % flush 10 mL,  10 mL, Intravenous, PRN, Daljit Vasquez MD    sodium chloride 0.9 % infusion 40 mL, 40 mL, Intravenous, PRN, Daljit Vasquez MD    traMADol (ULTRAM) tablet 50 mg, 50 mg, Oral, Q6H PRN, Daljit Vasquez MD    Review of Systems     Constitution:  negative for chills, fatigue and fevers  Eyes:  negative for blurriness and change of vision  ENT:   negative for sore throat and voice change  Respiratory: negative for  cough and shortness of air  Cardiovascular:  Negative for chest pain or palpitations  Gastrointestinal:  negative for  See HPI  Genitourinary:  negative for  blood in urine and painful urination  Integument: negative for  rash and redness  Hematologic / Lymphatic: negative for  excessive bleeding and easy bruising  Musculoskeletal: negative for  joint pain and joint stiffness out of the ordinary  Neurological:  negative for  seizures and speech abnormality  Behavioral/Psych:  negative for  anxiety and depression out of the ordinary  Endocrine: negative for  diabetes and weight loss, unintended  Allergies / Immunologic:  negative for  anaphylaxis and rash        Objective     Vital Signs  Temp:  [97.1 °F (36.2 °C)-98.6 °F (37 °C)] 97.2 °F (36.2 °C)  Heart Rate:  [75-94] 85  Resp:  [14-20] 20  BP: (128-155)/(59-85) 152/63  FiO2 (%):  [100 %] 100 %  Body mass index is 27.14 kg/m².    Intake/Output Summary (Last 24 hours) at 2/17/2025 1317  Last data filed at 2/17/2025 0900  Gross per 24 hour   Intake 1579 ml   Output --   Net 1579 ml     No intake/output data recorded.       Physical Exam:   General: patient awake, alert and cooperative   Eyes: Normal lids and lashes, no scleral icterus   Neck: supple, normal ROM   Skin: warm and dry, not jaundiced   Cardiovascular: regular rhythm and rate, no murmurs auscultated   Pulm: clear to auscultation bilaterally, regular and unlabored   Abdomen: soft, nontender, nondistended; normal bowel sounds   Rectal: deferred   Extremities: no rash or  edema   Psychiatric: Normal mood and behavior; memory intact         Results Review:    I have reviewed all of the patients current test results  Results from last 7 days   Lab Units 02/17/25  0729 02/16/25 2318 02/16/25  1006 02/16/25  0020 02/15/25  1522   WBC 10*3/mm3 9.70  --   --   --  10.21   HEMOGLOBIN g/dL 9.2* 8.8* 9.4*   < > 11.5*   HEMATOCRIT % 32.4* 28.7* 29.8*   < > 37.0   PLATELETS 10*3/mm3 153  --   --   --  237    < > = values in this interval not displayed.       Results from last 7 days   Lab Units 02/17/25  0939 02/17/25 0729 02/16/25 2318 02/16/25  1006 02/15/25  1522   SODIUM mmol/L  --  139  --  144 144   POTASSIUM mmol/L 3.9 4.5 3.5 3.2* 3.0*   CHLORIDE mmol/L  --  109*  --  107 106   CO2 mmol/L  --  21.0*  --  29.0 23.0   BUN mg/dL  --  38*  --  59* 52*   CREATININE mg/dL  --  0.44*  --  0.54* 0.56*   CALCIUM mg/dL  --  8.1*  --  8.1* 8.7   BILIRUBIN mg/dL  --   --   --   --  0.3   ALK PHOS U/L  --   --   --   --  60   ALT (SGPT) U/L  --   --   --   --  20   AST (SGOT) U/L  --   --   --   --  26   GLUCOSE mg/dL  --  110*  --  120* 233*       Results from last 7 days   Lab Units 02/15/25  1522   INR  2.26*       Lab Results   Lab Value Date/Time    LIPASE 25 10/20/2022 1456       Radiology:    Imaging Results (Last 24 Hours)       ** No results found for the last 24 hours. **              Assessment & Plan       GI bleeding    GI bleed      Impression/Plan    Hematemesis  Coffee-ground vomiting  Associated with high BUN with normal creatinine  Continue to monitor hemoglobin  Gastroscopy today showed medium size hiatal hernia  Switch pantoprazole to oral  Resume diet  Okay to resume anticoagulant      Electronically signed by Deric Butterfield MD, 02/17/25, 1:17 PM CST.     DISCLAIMER:    This physician works through a [employer] as an inpatient consultant gastroenterologist only and has no outpatient clinic for patient follow up.  Any results not available at time of inpatient  discharge and/or GI clinic follow up should be managed by the hospitalist team, PCP, or outpatient gastroenterologist.    Deric Butterfield MD  02/17/25  13:17 CST

## 2025-02-17 NOTE — PLAN OF CARE
"Goal Outcome Evaluation:  Plan of Care Reviewed With: spouse        Progress: no change  Outcome Evaluation: PT eval completed.  Pt pleasant, however flat affect w/ minimal response.  Pt does turn head and look at therapist, verbally very soft spoken, most words sound mumbled, occassionally answers w/ a \"yep.\"  Per family it requires assist of 2 for pt to complete tfers.  Reports many times she will act resistant, but staff encourage and then she enjoys being up.  Pt required assist to move all 4 extremities w/ noted resistance at B hips for abduction.  Decrease ROM at B hip abduction, R ankle DF and B shlds.  Pt max dependent X 2 to roll L and R.  Dependent to scoot up in bed.  Pt unable to safely attempt sitting edge of bed at this time and progress out of bed activity due to decrease cognition, ability to follow commands, resistance to mvmt and weakness.  Pt will benefit from continued PT services to improve strength, mobility, balance, activity tolerance, and to progress out of bed activity working to assist w/ skin protection/pressure relief.  Will follow for progress and needs.  Anticipate pt to return to SNF at discharge.    Anticipated Discharge Disposition (PT): skilled nursing facility                        "

## 2025-02-17 NOTE — ANESTHESIA PREPROCEDURE EVALUATION
Anesthesia Evaluation     NPO Solid Status: > 8 hours  NPO Liquid Status: > 8 hours           Airway   Mallampati: II  No difficulty expected  Dental      Pulmonary    (-) sleep apnea, not a smoker  Cardiovascular   Exercise tolerance: poor (<4 METS)    (+) hypertension, dysrhythmias Atrial Fib, hyperlipidemia    ROS comment: Echo 2018  Mild as  Normal ef    Neuro/Psych  (+) CVA, psychiatric history Depression, dementia  GI/Hepatic/Renal/Endo    (+) GI bleeding , diabetes mellitus  (-) liver disease, no renal disease    ROS Comment: Admitted with hematemesis, having egd to eval    Musculoskeletal     Abdominal    Substance History      OB/GYN          Other   arthritis, blood dyscrasia anemia,                   Anesthesia Plan    ASA 3     MAC     (History obtained from  who is at bedside)  intravenous induction     Anesthetic plan, risks, benefits, and alternatives have been provided, discussed and informed consent has been obtained with: patient.    CODE STATUS:    Level Of Support Discussed With: Patient  Code Status (Patient has no pulse and is not breathing): CPR (Attempt to Resuscitate)  Medical Interventions (Patient has pulse or is breathing): Full Support

## 2025-02-17 NOTE — THERAPY EVALUATION
Patient Name: uRpa Hernandez  : 1935    MRN: [id number]                              Today's Date: 2025       Admit Date: 2/15/2025    Visit Dx:     ICD-10-CM ICD-9-CM   1. Gastrointestinal hemorrhage, unspecified gastrointestinal hemorrhage type  K92.2 578.9   2. Chronic anticoagulation  Z79.01 V58.61   3. Impaired functional mobility and activity tolerance  Z74.09 V49.89     Patient Active Problem List   Diagnosis    Essential hypertension    Mixed hyperlipidemia    Cerebrovascular accident (CVA)    PAF (paroxysmal atrial fibrillation)    Vascular dementia without behavioral disturbance    Depression    Dementia    Cerebrovascular small vessel disease    Nonrheumatic aortic valve stenosis Mild  Echo    Hypertensive left ventricular hypertrophy, without heart failure Mild to moderate  Echo    Lumbar compression fracture    Class 2 severe obesity due to excess calories with serious comorbidity and body mass index (BMI) of 35.0 to 35.9 in adult    Non-tobacco user    Closed compression fracture of L1 lumbar vertebra    S/P kyphoplasty    GI bleeding    GI bleed     Past Medical History:   Diagnosis Date    Arthritis     Dementia     Dementia     Depression     Diabetes mellitus     DIET CONTROL    Hyperlipidemia     Hypertension     Rectal polyp     Stroke      Past Surgical History:   Procedure Laterality Date    APPENDECTOMY      CATARACT EXTRACTION W/ INTRAOCULAR LENS  IMPLANT, BILATERAL      CHOLECYSTECTOMY WITH INTRAOPERATIVE CHOLANGIOGRAM N/A 10/27/2022    Procedure: CHOLECYSTECTOMY LAPAROSCOPIC;  Surgeon: Mehnaz Maloney MD;  Location:  [facility];  Service: General;  Laterality: N/A;    COLONOSCOPY  2014    diverticulosis    DILATION AND CURETTAGE, DIAGNOSTIC / THERAPEUTIC      HYSTERECTOMY      JOINT REPLACEMENT      KNEES    KNEE SURGERY Bilateral     X2    KYPHOPLASTY N/A 2019    Procedure: KYPHOPLASTY, Lumbar 1;  Surgeon: Marco Sandy MD;  Location:  [facility];   "Service: Neurosurgery    OOPHORECTOMY      ROTATOR CUFF REPAIR Right       General Information       Row Name 02/17/25 1102          Physical Therapy Time and Intention    Document Type evaluation;other (see comments)  see MAR  -JE     Mode of Treatment physical therapy  -       Row Name 02/17/25 1102          General Information    Patient Profile Reviewed yes  -JE     Prior Level of Function --  per pt's spouse - pt is at SNF and requires 2 people to assist her w/ tfer bed to/from w/c, however they do say the staff in therapy work w/ her on walking, mostly hand held assist they think  -JE     Existing Precautions/Restrictions fall  -JE     Barriers to Rehab medically complex;previous functional deficit;cognitive status;physical barrier  -       Row Name 02/17/25 1102          Living Environment    People in Home facility resident  -ZARINA     Name(s) of People in Home Resides at [facility]  -       Row Name 02/17/25 1102          Cognition    Orientation Status (Cognition) unable/difficult to assess;other (see comments)  pt has very soft voice, difficult to understand; many times mumbled sounding. occassional \"yep\" is answered  -       Row Name 02/17/25 1102          Safety Issues/Impairments Affecting Functional Mobility    Safety Issues Affecting Function (Mobility) ability to follow commands;at risk behavior observed;friction/shear risk;safety precaution awareness;insight into deficits/self-awareness  -     Impairments Affecting Function (Mobility) balance;cognition;endurance/activity tolerance;strength;range of motion (ROM);postural/trunk control  -     Cognitive Impairments, Mobility Safety/Performance awareness, need for assistance;insight into deficits/self-awareness;safety precaution awareness  -               User Key  (r) = Recorded By, (t) = Taken By, (c) = Cosigned By      Initials Name Provider Type    Hilda Lantigua, PT Physical Therapist                   Mobility       " [facility] Name 02/17/25 1102          Bed Mobility    Bed Mobility rolling left;rolling right;scooting/bridging  -     Rolling Left [place] (Bed Mobility) maximum assist (25% patient effort);dependent (less than 25% patient effort);2 person assist;verbal cues  -     Rolling Right [place] (Bed Mobility) maximum assist (25% patient effort);dependent (less than 25% patient effort);2 person assist;verbal cues  -ZARINA     Scooting/Bridging [place] (Bed Mobility) dependent (less than 25% patient effort);2 person assist;verbal cues  -     Assistive Device (Bed Mobility) bed rails;head of bed elevated  -[facility] Name 02/17/25 1102          Gait/Stairs (Locomotion)    Comment, (Gait/Stairs) unable to safely attempt further activity, to progress to sitting or out of bed due to weakness, difficulty following commands and resistance to mvmt  -               User Key  (r) = Recorded By, (t) = Taken By, (c) = Cosigned By      Initials Name Provider Type    Hilda Lantigua, PT Physical Therapist                   Obj/Interventions       [facility] Name 02/17/25 1102          Range of Motion Comprehensive    Comment, General Range of Motion AA to PROM B UEs WFLs except L shld limited to less than 15% and R shld limited to less than 50%; PROM B LEs w/ increase resistance to hip abduciton R worse than L; R ankle DF unable to achieve neutral PROM; PROM B hips flexion/knee flexion and L ankle DF WFLs  -[facility] Name 02/17/25 1102          Strength Comprehensive (MMT)    Comment, General Manual Muscle Testing (MMT) Assessment pt did not follow commands to perform AROM in order to complete ms testing  -[facility] Name 02/17/25 1102          Balance    Comment, Balance unable to sit edge of bed safely this date; Max dependent to maintain sidelying positioning for hygiene care  -[facility] Name 02/17/25 1102          Sensory Assessment (Somatosensory)    Sensory Assessment (Somatosensory) unable/difficult to assess   -               User Key  (r) = Recorded By, (t) = Taken By, (c) = Cosigned By      Initials Name Provider Type    Hilda Lantigua, PT Physical Therapist                   Goals/Plan       Row Name 02/17/25 1102          Bed Mobility Goal 1 (PT)    Activity/Assistive Device (Bed Mobility Goal 1, PT) rolling to left;rolling to right;sit to supine/supine to sit;sidelying to sit/sit to sidelying  -     [place] Level/Cues Needed (Bed Mobility Goal 1, PT) moderate assist (50-74% patient effort)  -     Time Frame (Bed Mobility Goal 1, PT) long term goal (LTG);10 days  -JE     Progress/Outcomes (Bed Mobility Goal 1, PT) goal ongoing  -[facility] Name 02/17/25 1102          ROM Goal 1 (PT)    ROM Goal 1 (PT) pt to perform AA to AROM B LEs X 8-10 reps throughout multiple planes  -JE     Time Frame (ROM Goal 1, PT) long-term goal (LTG);10 days  -JE     Progress/Outcome (ROM Goal 1, PT) goal ongoing  -[facility] Name 02/17/25 1102          Balance Goal 1 (PT)    Activity/Assistive Device (Balance Goal) sitting static balance;sitting dynamic balance  -     [place] Level/Cues Needed (Balance Goal 1, PT) supervision required  -     Time Frame (Balance Goal 1, PT) long-term goal (LTG);other (see comments)  10 days  -     Progress/Outcomes (Balance Goal 1, PT) goal ongoing  -[facility] Name 02/17/25 1102          Therapy Assessment/Plan (PT)    Planned Therapy Interventions (PT) balance training;bed mobility training;patient/family education;ROM (range of motion);strengthening;other (see comments)  safety/falls prevention, skin protection/pressure relief  -               User Key  (r) = Recorded By, (t) = Taken By, (c) = Cosigned By      Initials Name Provider Type    Hilda Lantigua, PT Physical Therapist                   Clinical Impression       Row Name 02/17/25 1102          Pain    Additional Documentation Pain Scale: FACES Pre/Post-Treatment (Group)  -[facility] Name 02/17/25 1102     "      Pain Scale: FACES Pre/Post-Treatment    Pain: FACES Scale, Pretreatment 0-->no hurt  -     Posttreatment Pain Rating 0-->no hurt  -JE     Pre/Posttreatment Pain Comment spouse stated pt may have pain at B shlds w/ ROM due to rotator cuff problems and recent fracture, however no grimacing noted  -       Row Name 02/17/25 1102          Plan of Care Review    Plan of Care Reviewed With spouse  -     Progress no change  -     Outcome Evaluation PT eval completed.  Pt pleasant, however flat affect w/ minimal response.  Pt does turn head and look at therapist, verbally very soft spoken, most words sound mumbled, occassionally answers w/ a \"yep.\"  Per family it requires assist of 2 for pt to complete tfers.  Reports many times she will act resistant, but staff encourage and then she enjoys being up.  Pt required assist to move all 4 extremities w/ noted resistance at B hips for abduction.  Decrease ROM at B hip abduction, R ankle DF and B shlds.  Pt max dependent X 2 to roll L and R.  Dependent to scoot up in bed.  Pt unable to safely attempt sitting edge of bed at this time and progress out of bed activity due to decrease cognition, ability to follow commands, resistance to mvmt and weakness.  Pt will benefit from continued PT services to improve strength, mobility, balance, activity tolerance, and to progress out of bed activity working to assist w/ skin protection/pressure relief.  Will follow for progress and needs.  Anticipate pt to return to SNF at discharge.  -       Row Name 02/17/25 110          Therapy Assessment/Plan (PT)    Patient/Family Therapy Goals Statement (PT) to prevent effects from bedrest  -     Rehab Potential (PT) fair  -     Criteria for Skilled Interventions Met (PT) yes;meets criteria;skilled treatment is necessary  -     Therapy Frequency (PT) 2 times/day  -     Predicted Duration of Therapy Intervention (PT) until discharge or goals achieved  -       Row Name 02/17/25 " 1102          Vital Signs    O2 Delivery Pre Treatment room air  -JE     O2 Delivery Intra Treatment room air  -JE     O2 Delivery Post Treatment room air  -JE     Pre Patient Position Supine  -JE     Intra Patient Position Side Lying  -JE     Post Patient Position Supine  fowlers  -       Row Name 02/17/25 1102          Positioning and Restraints    Pre-Treatment Position in bed  -JE     Post Treatment Position bed  -JE     In Bed notified nsg;side lying right;fowlers;call light within reach;encouraged to call for assist;exit alarm on;with family/caregiver;side rails up x2;SCD pump applied;pillow between legs;legs elevated  heels floating  -               User Key  (r) = Recorded By, (t) = Taken By, (c) = Cosigned By      Initials Name Provider Type    Hlida Lantigua, PT Physical Therapist                   Outcome Measures       Row Name 02/17/25 1102          How much help from another person do you currently need...    Turning from your back to your side while in flat bed without using bedrails? 1  -JE     Moving from lying on back to sitting on the side of a flat bed without bedrails? 1  -JE     Moving to and from a bed to a chair (including a wheelchair)? 1  -JE     Standing up from a chair using your arms (e.g., wheelchair, bedside chair)? 1  -JE     Climbing 3-5 steps with a railing? 1  -JE     To walk in hospital room? 1  -JE     AM-PAC 6 Clicks Score (PT) 6  -     Highest Level of Mobility Goal 2 --> Bed activities/dependent transfer  -       Row Name 02/17/25 1102 02/17/25 1056       Functional Assessment    Outcome Measure Options AM-PAC 6 Clicks Basic Mobility (PT)  - AM-PAC 6 Clicks Daily Activity (OT)  -MM              User Key  (r) = Recorded By, (t) = Taken By, (c) = Cosigned By      Initials Name Provider Type    MM Eliezer Cates, OTR/L Occupational Therapist    Hilda Lantigua, PT Physical Therapist                                 Physical Therapy Education       Title: PT  OT SLP Therapies (In Progress)       Topic: Physical Therapy (In Progress)       Point: Mobility training (In Progress)       Learning Progress Summary            Patient Acceptance, E,TB,D, NR by  at 2/17/2025 1330    Comment: Education re: purpose of PT/importance of activity, improved tech w/ bed mobility, positioning to assist w/ skin protection/pressure relief   Family Acceptance, E,TB,D, NR by  at 2/17/2025 1330    Comment: Education re: purpose of PT/importance of activity, improved tech w/ bed mobility, positioning to assist w/ skin protection/pressure relief   Significant Other Acceptance, E,TB,D, NR by ZARINA at 2/17/2025 1330    Comment: Education re: purpose of PT/importance of activity, improved tech w/ bed mobility, positioning to assist w/ skin protection/pressure relief                      Point: Home exercise program (Not Started)       Learner Progress:  Not documented in this visit.              Point: Precautions (In Progress)       Learning Progress Summary            Patient Acceptance, E,TB,D, NR by  at 2/17/2025 1330    Comment: Education re: purpose of PT/importance of activity, improved tech w/ bed mobility, positioning to assist w/ skin protection/pressure relief   Family Acceptance, E,TB,D, NR by  at 2/17/2025 1330    Comment: Education re: purpose of PT/importance of activity, improved tech w/ bed mobility, positioning to assist w/ skin protection/pressure relief   Significant Other Acceptance, E,TB,D, NR by  at 2/17/2025 1330    Comment: Education re: purpose of PT/importance of activity, improved tech w/ bed mobility, positioning to assist w/ skin protection/pressure relief                                      User Key       Initials Effective Dates Name Provider Type Discipline     08/02/18 -  Hilda Davison, PT Physical Therapist PT                  PT Recommendation and Plan  Planned Therapy Interventions (PT): balance training, bed mobility training, patient/family  "education, ROM (range of motion), strengthening, other (see comments) (safety/falls prevention, skin protection/pressure relief)  Progress: no change  Outcome Evaluation: PT eval completed.  Pt pleasant, however flat affect w/ minimal response.  Pt does turn head and look at therapist, verbally very soft spoken, most words sound mumbled, occassionally answers w/ a \"yep.\"  Per family it requires assist of 2 for pt to complete tfers.  Reports many times she will act resistant, but staff encourage and then she enjoys being up.  Pt required assist to move all 4 extremities w/ noted resistance at B hips for abduction.  Decrease ROM at B hip abduction, R ankle DF and B shlds.  Pt max dependent X 2 to roll L and R.  Dependent to scoot up in bed.  Pt unable to safely attempt sitting edge of bed at this time and progress out of bed activity due to decrease cognition, ability to follow commands, resistance to mvmt and weakness.  Pt will benefit from continued PT services to improve strength, mobility, balance, activity tolerance, and to progress out of bed activity working to assist w/ skin protection/pressure relief.  Will follow for progress and needs.  Anticipate pt to return to SNF at discharge.     Time Calculation:         PT Charges       Row Name 02/17/25 1235             Time Calculation    Start Time 1102  additional 6 min in chart review completed  -      Stop Time 1156  -      Time Calculation (min) 54 min  -      PT Received On 02/17/25  -      PT Goal Re-Cert Due Date 02/27/25  -                User Key  (r) = Recorded By, (t) = Taken By, (c) = Cosigned By      Initials Name Provider Type    Hilda Lantigua, PT Physical Therapist                  Therapy Charges for Today       Code Description Service Date Service Provider Modifiers Qty    [id number]  PT EVAL MOD COMPLEXITY 4 2/17/2025 Hilda Davison, PT GP 1            PT G-Codes  Outcome Measure Options: AM-PAC 6 Clicks Basic Mobility " (PT)  AM-PAC 6 Clicks Score (PT): 6  AM-PAC 6 Clicks Score (OT): 9  PT Discharge Summary  Anticipated Discharge Disposition (PT): skilled nursing facility    Hilda Davison, PT  2/17/2025

## 2025-02-17 NOTE — PLAN OF CARE
Goal Outcome Evaluation:  Plan of Care Reviewed With: patient, spouse           Outcome Evaluation: See note    Anticipated Discharge Disposition (SLP): skilled nursing facility          SLP Swallowing Diagnosis: functional oral phase, functional pharyngeal phase (02/17/25 1134)

## 2025-02-17 NOTE — THERAPY DISCHARGE NOTE
Acute Care - Speech Language Pathology   Swallow Initial Evaluation/Discharge [facility]     Patient Name: Rupa Hernandez  : 1935  MRN: [id number]  Today's Date: 2025               Admit Date: 2/15/2025    SPEECH-LANGUAGE PATHOLOGY EVALUATION - SWALLOW  Subjective: The patient was seen on this date for a Clinical Swallow evaluation.  Patient was alert and cooperative.  Significant history: GI bleed, medium size hiatal hernia.   Objective: Textures given included thin liquid, puree consistency, and regular consistency.  Assessment: Difficulties were noted with regular consistency.  Observations:  Prolonged mastication of the regular solid but good clearance. No overt s/s of aspiration.  reports pt takes her meds crushed with applesauce.   SLP Findings:  Patient presents with functional swallow, without esophageal component.   Recommendations: Diet Textures: thin liquid, regular consistency food.  Medications should be taken crushed with puree.   Recommended Strategies:  1:1 assistance with meals, Upright for PO, small bites and sips, and alternate liquids and solids. Oral care before breakfast, after all meals and PRN.  Dysphagia therapy is not recommended.   Yamilka Gonsalves, JAMISON-SLP 2025 14:56 CST    Visit Dx:    ICD-10-CM ICD-9-CM   1. Gastrointestinal hemorrhage, unspecified gastrointestinal hemorrhage type  K92.2 578.9   2. Chronic anticoagulation  Z79.01 V58.61   3. Impaired functional mobility and activity tolerance  Z74.09 V49.89   4. Dysphagia, unspecified type  R13.10 787.20     Patient Active Problem List   Diagnosis    Essential hypertension    Mixed hyperlipidemia    Cerebrovascular accident (CVA)    PAF (paroxysmal atrial fibrillation)    Vascular dementia without behavioral disturbance    Depression    Dementia    Cerebrovascular small vessel disease    Nonrheumatic aortic valve stenosis Mild 2015 Echo    Hypertensive left ventricular hypertrophy, without heart failure Mild to  moderate 2015 Echo    Lumbar compression fracture    Class 2 severe obesity due to excess calories with serious comorbidity and body mass index (BMI) of 35.0 to 35.9 in adult    Non-tobacco user    Closed compression fracture of L1 lumbar vertebra    S/P kyphoplasty    GI bleeding    GI bleed     Past Medical History:   Diagnosis Date    Arthritis     Dementia     Dementia     Depression     Diabetes mellitus     DIET CONTROL    Hyperlipidemia     Hypertension     Rectal polyp     Stroke      Past Surgical History:   Procedure Laterality Date    APPENDECTOMY      CATARACT EXTRACTION W/ INTRAOCULAR LENS  IMPLANT, BILATERAL      CHOLECYSTECTOMY WITH INTRAOPERATIVE CHOLANGIOGRAM N/A 10/27/2022    Procedure: CHOLECYSTECTOMY LAPAROSCOPIC;  Surgeon: Mehnaz Maloney MD;  Location: [facility] OR;  Service: General;  Laterality: N/A;    COLONOSCOPY  07/14/2014    diverticulosis    DILATION AND CURETTAGE, DIAGNOSTIC / THERAPEUTIC      HYSTERECTOMY      JOINT REPLACEMENT      KNEES    KNEE SURGERY Bilateral     X2    KYPHOPLASTY N/A 7/11/2019    Procedure: KYPHOPLASTY, Lumbar 1;  Surgeon: Marco Sandy MD;  Location: [facility] OR;  Service: Neurosurgery    OOPHORECTOMY      ROTATOR CUFF REPAIR Right        SLP Recommendation and Plan  SLP Swallowing Diagnosis: functional oral phase, functional pharyngeal phase (02/17/25 1412)  SLP Diet Recommendation: regular textures, thin liquids (02/17/25 1412)     Monitor for Signs of Aspiration: yes, cough, gurgly voice, throat clearing (02/17/25 1412)     Swallow Criteria for Skilled Therapeutic Interventions Met: no problems identified which require skilled intervention, baseline status (02/17/25 1412)  Anticipated Discharge Disposition (SLP): skilled nursing facility (02/17/25 1455)     Therapy Frequency (Swallow): evaluation only (02/17/25 1412)              Anticipated Discharge Disposition (SLP): skilled nursing facility (02/17/25 1455)           Reason for Discharge: other (see  comments) (Baseline) (02/17/25 5735)                Outcome Evaluation: See note (02/17/25 4254)    SWALLOW EVALUATION (Last 72 Hours)       SLP Adult Swallow Evaluation       Row Name 02/17/25 1412 02/16/25 0744                Rehab Evaluation    Document Type discharge evaluation/summary  -MB --       Subjective Information no complaints  -MB --       Patient Observations alert;cooperative  -MB --       Patient/Family/Caregiver Comments/Observations  present  -MB --       Session Not Performed -- unable to evaluate, medical status change  -JR       Comment -- Pt NPO for GI d/t concern for upper GI bleeding  -JR          General Information    Patient Profile Reviewed yes  -MB --       Pertinent History Of Current Problem GI bleed, medium size hiatal hernia.  -MB --       Current Method of Nutrition NPO  -MB --       Precautions/Limitations, Vision WFL;for purposes of eval  -MB --       Precautions/Limitations, Hearing WFL;for purposes of eval  -MB --       Prior Level of Function-Communication cognitive-linguistic impairment  -MB --       Prior Level of Function-Swallowing no diet consistency restrictions  -MB --       Plans/Goals Discussed with patient;spouse/S.O.  -MB --       Barriers to Rehab cognitive status  -MB --       Patient's Goals for Discharge patient did not state  -MB --       Family Goals for Discharge family did not state  -MB --          Pain    Additional Documentation Pain Scale: FACES Pre/Post-Treatment (Group)  -MB --          Pain Scale: FACES Pre/Post-Treatment    Pain: FACES Scale, Pretreatment 0-->no hurt  -MB --          Oral Motor Structure and Function    Dentition Assessment poor oral hygiene  -MB --       Secretion Management WNL/WFL  -MB --       Mucosal Quality sticky  -MB --          Oral Musculature and Cranial Nerve Assessment    Oral Motor General Assessment unable to assess;other (see comments)  Pt unable to follow commands  -MB --          General Eating/Swallowing  Observations    Eating/Swallowing Skills fed by SLP  -MB --       Positioning During Eating upright in bed  -MB --       Utensils Used spoon;straw  -MB --       Consistencies Trialed regular textures;pureed;thin liquids  -MB --          Clinical Swallow Eval    Oral Prep Phase impaired  -MB --       Oral Transit WFL  -MB --       Oral Residue WFL  -MB --       Pharyngeal Phase no overt signs/symptoms of pharyngeal impairment  -MB --       Esophageal Phase suspected esophageal impairment  -MB --       Clinical Swallow Evaluation Summary See note  -MB --          Oral Prep Concerns    Oral Prep Concerns prolonged mastication  -MB --       Prolonged Mastication regular consistencies  -MB --          Esophageal Phase Concerns    Esophageal Phase Concerns belching  -MB --       Belching thin  -MB --          SLP Evaluation Clinical Impression    SLP Swallowing Diagnosis functional oral phase;functional pharyngeal phase  -MB --       Functional Impact no impact on function  -MB --       Swallow Criteria for Skilled Therapeutic Interventions Met no problems identified which require skilled intervention;baseline status  -MB --          Recommendations    Therapy Frequency (Swallow) evaluation only  -MB --       SLP Diet Recommendation regular textures;thin liquids  -MB --       Recommended Precautions and Strategies upright posture during/after eating;small bites of food and sips of liquid;alternate between small bites of food and sips of liquid;general aspiration precautions;assist with feeding  -MB --       Oral Care Recommendations Oral Care BID/PRN  -MB --       SLP Rec. for Method of Medication Administration meds crushed;with puree  -MB --       Monitor for Signs of Aspiration yes;cough;gurgly voice;throat clearing  -MB --       Anticipated Discharge Disposition (SLP) skilled nursing facility  -MB --                 User Key  (r) = Recorded By, (t) = Taken By, (c) = Cosigned By      Initials Name Effective Dates    MB  aYmilka Gonsalves CCC-SLP 02/03/23 -     Jolly Mary, MS CCC-SLP 08/22/23 -                     EDUCATION  The patient has been educated in the following areas:   Dysphagia (Swallowing Impairment).                   Time Calculation:    Time Calculation- SLP       Row Name 02/17/25 1456             Time Calculation- SLP    SLP Start Time 1412  -MB      SLP Stop Time 1456  -MB      SLP Time Calculation (min) 44 min  -MB      SLP Received On 02/17/25  -MB         Untimed Charges    73060-KB Eval Oral Pharyng Swallow Minutes 44  -MB         Total Minutes    Untimed Charges Total Minutes 44  -MB       Total Minutes 44  -MB                User Key  (r) = Recorded By, (t) = Taken By, (c) = Cosigned By      Initials Name Provider Type    Yamilka Rodarte CCC-SLP Speech and Language Pathologist                    Therapy Charges for Today       Code Description Service Date Service Provider Modifiers Qty    [id number] HC ST EVAL ORAL PHARYNG SWALLOW 3 2/17/2025 Yamilka Gonsalves CCC-SLP GN 1                 SLP Discharge Summary  Anticipated Discharge Disposition (SLP): skilled nursing facility  Reason for Discharge: other (see comments) (Baseline)  Progress Toward Achieving Short/long Term Goals: other (see comments) (Baseline)  Discharge Destination: other (see comments) (Still in acute care)    RADHA Yates  2/17/2025

## 2025-02-17 NOTE — CASE MANAGEMENT/SOCIAL WORK
Discharge Planning Assessment   [place]     Patient Name: Rupa Hernandez  MRN: [id number]  Today's Date: 2/17/2025    Admit Date: 2/15/2025        Discharge Needs Assessment       Row Name 02/17/25 0914       Living Environment    People in Home facility resident    Current Living Arrangements residential facility    Potentially Unsafe Housing Conditions none    In the past 12 months has the electric, gas, oil, or water company threatened to shut off services in your home? No    Primary Care Provided by other (see comments)    Provides Primary Care For no one    Quality of Family Relationships involved;supportive    Able to Return to Prior Arrangements yes       Resource/Environmental Concerns    Resource/Environmental Concerns none       Transportation Needs    In the past 12 months, has lack of transportation kept you from medical appointments or from getting medications? no    In the past 12 months, has lack of transportation kept you from meetings, work, or from getting things needed for daily living? No       Food Insecurity    Within the past 12 months, you worried that your food would run out before you got the money to buy more. Never true    Within the past 12 months, the food you bought just didn't last and you didn't have money to get more. Never true       Transition Planning    Patient/Family Anticipates Transition to long-term care facility    Patient/Family Anticipated Services at Transition skilled nursing       Discharge Needs Assessment    Equipment Currently Used at Home wheelchair    Concerns to be Addressed discharge planning    Do you want help finding or keeping work or a job? I do not need or want help    Do you want help with school or training? For example, starting or completing job training or getting a high school diploma, GED or equivalent No    Anticipated Changes Related to Illness none    Equipment Needed After Discharge none    Discharge Coordination/Progress Patient is a resident at  [facility]. [phone].  I called and verified with Yuliya that patient does have a bed hold and can return at time of discharge.                   Discharge Plan    No documentation.                 Continued Care and Services - Admitted Since 2/15/2025    No active coordination exists for this encounter.          Demographic Summary    No documentation.                  Functional Status    No documentation.                  Psychosocial    No documentation.                  Abuse/Neglect    No documentation.                  Legal    No documentation.                  Substance Abuse    No documentation.                  Patient Forms    No documentation.                     Airam Lozano RN

## 2025-02-17 NOTE — THERAPY EVALUATION
Patient Name: Rupa Hernandez  : 1935    MRN: [id number]                              Today's Date: 2025       Admit Date: 2/15/2025    Visit Dx:     ICD-10-CM ICD-9-CM   1. Gastrointestinal hemorrhage, unspecified gastrointestinal hemorrhage type  K92.2 578.9   2. Chronic anticoagulation  Z79.01 V58.61   3. Impaired functional mobility and activity tolerance  Z74.09 V49.89     Patient Active Problem List   Diagnosis    Essential hypertension    Mixed hyperlipidemia    Cerebrovascular accident (CVA)    PAF (paroxysmal atrial fibrillation)    Vascular dementia without behavioral disturbance    Depression    Dementia    Cerebrovascular small vessel disease    Nonrheumatic aortic valve stenosis Mild  Echo    Hypertensive left ventricular hypertrophy, without heart failure Mild to moderate  Echo    Lumbar compression fracture    Class 2 severe obesity due to excess calories with serious comorbidity and body mass index (BMI) of 35.0 to 35.9 in adult    Non-tobacco user    Closed compression fracture of L1 lumbar vertebra    S/P kyphoplasty    GI bleeding    GI bleed     Past Medical History:   Diagnosis Date    Arthritis     Dementia     Dementia     Depression     Diabetes mellitus     DIET CONTROL    Hyperlipidemia     Hypertension     Rectal polyp     Stroke      Past Surgical History:   Procedure Laterality Date    APPENDECTOMY      CATARACT EXTRACTION W/ INTRAOCULAR LENS  IMPLANT, BILATERAL      CHOLECYSTECTOMY WITH INTRAOPERATIVE CHOLANGIOGRAM N/A 10/27/2022    Procedure: CHOLECYSTECTOMY LAPAROSCOPIC;  Surgeon: Mehnaz Maloney MD;  Location:  [facility];  Service: General;  Laterality: N/A;    COLONOSCOPY  2014    diverticulosis    DILATION AND CURETTAGE, DIAGNOSTIC / THERAPEUTIC      HYSTERECTOMY      JOINT REPLACEMENT      KNEES    KNEE SURGERY Bilateral     X2    KYPHOPLASTY N/A 2019    Procedure: KYPHOPLASTY, Lumbar 1;  Surgeon: Marco Sandy MD;  Location:  [facility];   Service: Neurosurgery    OOPHORECTOMY      ROTATOR CUFF REPAIR Right       General Information       Row Name 02/17/25 1056          OT Time and Intention    Subjective Information no complaints  -MM     Document Type evaluation  -MM     Mode of Treatment occupational therapy  -       Row Name 02/17/25 1056          General Information    Patient Profile Reviewed yes  -MM     Prior Level of Function --  Per pt's spouse and son, pt requires assistance of two for bed to w/c t/f at [facility] and that pt works on walking with RW with therapy at [facility].  -MM     Existing Precautions/Restrictions fall  -MM     Barriers to Rehab medically complex;previous functional deficit;cognitive status  -MM       Row Name 02/17/25 1056          Living Environment    People in Home facility resident  -MM       Row Name 02/17/25 1056          Cognition    Orientation Status (Cognition) other (see comments)  responds to her name.  -MM       Row Name 02/17/25 1056          Safety Issues/Impairments Affecting Functional Mobility    Safety Issues Affecting Function (Mobility) ability to follow commands;at risk behavior observed;awareness of need for assistance;friction/shear risk;insight into deficits/self-awareness;judgment;problem-solving;safety precaution awareness;safety precautions follow-through/compliance;sequencing abilities  -MM     Impairments Affecting Function (Mobility) balance;cognition;endurance/activity tolerance;range of motion (ROM);strength  -MM     Cognitive Impairments, Mobility Safety/Performance attention;awareness, need for assistance;insight into deficits/self-awareness;judgment;problem-solving/reasoning;safety precaution awareness;safety precaution follow-through;sequencing abilities  -MM               User Key  (r) = Recorded By, (t) = Taken By, (c) = Cosigned By      Initials Name Provider Type    MM Eliezer Cates, OTR/L Occupational Therapist                     Mobility/ADL's       Row Name 02/17/25 1053           Bed Mobility    Bed Mobility rolling left;rolling right;scooting/bridging  -MM     Rolling Left [place] (Bed Mobility) maximum assist (25% patient effort);1 person assist;2 person assist;verbal cues  -MM     Rolling Right [place] (Bed Mobility) maximum assist (25% patient effort);1 person assist;2 person assist;verbal cues  -MM     Scooting/Bridging [place] (Bed Mobility) dependent (less than 25% patient effort);2 person assist;verbal cues  -MM     Bed Mobility, Safety Issues cognitive deficits limit understanding  -MM     Assistive Device (Bed Mobility) bed rails;head of bed elevated  -MM     Comment, (Bed Mobility) further functional mobility deferred d/t pt weakness and resistance to activity.  -MM       Row Name 02/17/25 1056          Activities of Daily Living    BADL Assessment/Intervention toileting  -MM       Row Name 02/17/25 1056          Toileting Assessment/Training    [place] Level (Toileting) toileting skills;dependent (less than 25% patient effort);verbal cues;set up  pt was soiled in stool.  -MM     Position (Toileting) supine;other (see comments)  right and left sidelying  -MM               User Key  (r) = Recorded By, (t) = Taken By, (c) = Cosigned By      Initials Name Provider Type    MM Eliezer Cates, OTR/L Occupational Therapist                   Obj/Interventions       Row Name 02/17/25 1056          Sensory Assessment (Somatosensory)    Sensory Assessment (Somatosensory) unable/difficult to assess  -MM       Row Name 02/17/25 1056          Vision Assessment/Intervention    Visual Impairment/Limitations unable/difficult to assess;other (see comments)  pt visibly tracks within room at times.  -MM       Row Name 02/17/25 1056          Range of Motion Comprehensive    General Range of Motion bilateral upper extremity ROM WNL  -MM     Comment, General Range of Motion except bilateral shoulders, right shoulder is 75% impaired and left shoulder is 75-90% impaired.  -MM        Row Name 02/17/25 1056          Strength Comprehensive (MMT)    Comment, General Manual Muscle Testing (MMT) Assessment Difficult to formally assess 2' cognition, bilateral  4/5 and bilateral tricep/bicep atleast 4-/5.  -MM               User Key  (r) = Recorded By, (t) = Taken By, (c) = Cosigned By      Initials Name Provider Type    MM Eliezer Cates, OTR/L Occupational Therapist                   Goals/Plan       Row Name 02/17/25 1056          Transfer Goal 1 (OT)    Activity/Assistive Device (Transfer Goal 1, OT) toilet;bed-to-chair/chair-to-bed  -MM     [place] Level/Cues Needed (Transfer Goal 1, OT) moderate assist (50-74% patient effort);verbal cues required  -MM     Time Frame (Transfer Goal 1, OT) long term goal (LTG);by discharge  -MM     Progress/Outcome (Transfer Goal 1, OT) new goal  -MM       [facility] Name 02/17/25 1056          Grooming Goal 1 (OT)    Activity/Device (Grooming Goal 1, OT) grooming skills, all  -MM     [place] (Grooming Goal 1, OT) moderate assist (50-74% patient effort);verbal cues required;set-up required  -MM     Time Frame (Grooming Goal 1, OT) long term goal (LTG);by discharge  -MM     Progress/Outcome (Grooming Goal 1, OT) new goal  -MM       [facility] Name 02/17/25 1056          Self-Feeding Goal 1 (OT)    Activity/Device (Self-Feeding Goal 1, OT) self-feeding skills, all  -MM     [place] Level/Cues Needed (Self-Feeding Goal 1, OT) minimum assist (75% or more patient effort);verbal cues required;set-up required  -MM     Time Frame (Self-Feeding Goal 1, OT) long term goal (LTG);by discharge  -MM     Progress/Outcomes (Self-Feeding Goal 1, OT) new goal  -MM       Row Name 02/17/25 1056          Therapy Assessment/Plan (OT)    Planned Therapy Interventions (OT) activity tolerance training;BADL retraining;functional balance retraining;occupation/activity based interventions;patient/caregiver education/training;ROM/therapeutic exercise;strengthening  exercise;transfer/mobility retraining;adaptive equipment training;cognitive/visual perception retraining;passive ROM/stretching  -MM               User Key  (r) = Recorded By, (t) = Taken By, (c) = Cosigned By      Initials Name Provider Type    MM Eliezer Cates, OTR/L Occupational Therapist                   Clinical Impression       Row Name 02/17/25 1056          Pain Assessment    Additional Documentation Pain Scale: FACES Pre/Post-Treatment (Group)  -MM       Row Name 02/17/25 1056          Pain Scale: FACES Pre/Post-Treatment    Pain: FACES Scale, Pretreatment 0-->no hurt  -MM     Posttreatment Pain Rating 0-->no hurt  -MM       Row Name 02/17/25 1056          Plan of Care Review    Plan of Care Reviewed With patient;spouse;son  -MM     Progress no change  -MM     Outcome Evaluation OT evaluation completed. Pt is alert and agreeable to therapy. Pt's spouse and son are present intermittently and provide history and PLOF. Pt responds to her name. Pt with impaired command following. Pt with intermittent RUE tremors. BUE AROM WFL, except bilateral shoulders, right shoulder is 75% impaired and left shoulder is 75-90% impaired. Pt was max x1-2 for rolling right and left. Pt was found to bed soiled in stool, dependent for toilet hygiene and clothing management. Pt was increasingly resistive with movement, but remains pleasant throughout session. Skilled OT recommended to address adls, functional mobility and endurance. Recommended d/c SNF.  -MM       Row Name 02/17/25 1056          Therapy Assessment/Plan (OT)    Patient/Family Therapy Goal Statement (OT) pt does not state  -MM     Rehab Potential (OT) good  -MM     Criteria for Skilled Therapeutic Interventions Met (OT) yes;meets criteria;skilled treatment is necessary  -MM     Therapy Frequency (OT) 5 times/wk  -MM     Predicted Duration of Therapy Intervention (OT) until d/c  -MM       Row Name 02/17/25 1056          Therapy Plan Review/Discharge Plan (OT)     Anticipated Discharge Disposition (OT) skilled nursing facility  -       Row Name 02/17/25 1056          Positioning and Restraints    Pre-Treatment Position in bed  -MM     Post Treatment Position bed  -MM     In Bed notified nsg;fowlers;call light within reach;encouraged to call for assist;exit alarm on;with family/caregiver;side rails up x2  -MM               User Key  (r) = Recorded By, (t) = Taken By, (c) = Cosigned By      Initials Name Provider Type    Eliezer Fuentes, OTR/L Occupational Therapist                   Outcome Measures       Row Name 02/17/25 1056          How much help from another is currently needed...    Putting on and taking off regular lower body clothing? 1  -MM     Bathing (including washing, rinsing, and drying) 1  -MM     Toileting (which includes using toilet bed pan or urinal) 1  -MM     Putting on and taking off regular upper body clothing 2  -MM     Taking care of personal grooming (such as brushing teeth) 2  -MM     Eating meals 2  -MM     AM-PAC 6 Clicks Score (OT) 9  -MM       Row Name 02/17/25 1056          Functional Assessment    Outcome Measure Options AM-PAC 6 Clicks Daily Activity (OT)  -MM               User Key  (r) = Recorded By, (t) = Taken By, (c) = Cosigned By      Initials Name Provider Type    Eliezer Fuentes, OTR/L Occupational Therapist                    Occupational Therapy Education       Title: PT OT SLP Therapies (In Progress)       Topic: Occupational Therapy (In Progress)       Point: ADL training (In Progress)       Description:   Instruct learner(s) on proper safety adaptation and remediation techniques during self care or transfers.   Instruct in proper use of assistive devices.                  Learning Progress Summary            Patient Acceptance, E, NR by ROSETTE at 2/17/2025 1327                      Point: Home exercise program (Not Started)       Description:   Instruct learner(s) on appropriate technique for monitoring, assisting and/or  progressing therapeutic exercises/activities.                  Learner Progress:  Not documented in this visit.              Point: Precautions (In Progress)       Description:   Instruct learner(s) on prescribed precautions during self-care and functional transfers.                  Learning Progress Summary            Patient Acceptance, E, NR by  at 2/17/2025 1327                      Point: Body mechanics (In Progress)       Description:   Instruct learner(s) on proper positioning and spine alignment during self-care, functional mobility activities and/or exercises.                  Learning Progress Summary            Patient Acceptance, E, NR by MM at 2/17/2025 1327                                      User Key       Initials Effective Dates Name Provider Type Discipline     07/11/23 -  Eliezer Cates E, OTR/L Occupational Therapist OT                  OT Recommendation and Plan  Planned Therapy Interventions (OT): activity tolerance training, BADL retraining, functional balance retraining, occupation/activity based interventions, patient/caregiver education/training, ROM/therapeutic exercise, strengthening exercise, transfer/mobility retraining, adaptive equipment training, cognitive/visual perception retraining, passive ROM/stretching  Therapy Frequency (OT): 5 times/wk  Plan of Care Review  Plan of Care Reviewed With: patient, spouse, son  Progress: no change  Outcome Evaluation: OT evaluation completed. Pt is alert and agreeable to therapy. Pt's spouse and son are present intermittently and provide history and PLOF. Pt responds to her name. Pt with impaired command following. Pt with intermittent RUE tremors. BUE AROM WFL, except bilateral shoulders, right shoulder is 75% impaired and left shoulder is 75-90% impaired. Pt was max x1-2 for rolling right and left. Pt was found to bed soiled in stool, dependent for toilet hygiene and clothing management. Pt was increasingly resistive with movement, but  remains pleasant throughout session. Skilled OT recommended to address adls, functional mobility and endurance. Recommended d/c SNF.     Time Calculation:         Time Calculation- OT       Row Name 02/17/25 1056             Time Calculation- OT    OT Start Time 1056  -MM      OT Stop Time 1157  -MM      OT Time Calculation (min) 61 min  -MM      OT Received On 02/17/25  -MM      OT Goal Re-Cert Due Date 02/27/25  -MM                User Key  (r) = Recorded By, (t) = Taken By, (c) = Cosigned By      Initials Name Provider Type    Eliezer Fuentes, OTR/L Occupational Therapist                  Therapy Charges for Today       Code Description Service Date Service Provider Modifiers Qty    30429251769 HC OT EVAL MOD COMPLEXITY 4 2/17/2025 Eliezer Cates OTR/L GO 1                 RASTA Mcdowell/PEDRO  2/17/2025

## 2025-02-18 VITALS
DIASTOLIC BLOOD PRESSURE: 41 MMHG | OXYGEN SATURATION: 98 % | TEMPERATURE: 98.3 F | SYSTOLIC BLOOD PRESSURE: 121 MMHG | WEIGHT: 148.37 LBS | HEIGHT: 62 IN | RESPIRATION RATE: 16 BRPM | HEART RATE: 79 BPM | BODY MASS INDEX: 27.3 KG/M2

## 2025-02-18 LAB
ANION GAP SERPL CALCULATED.3IONS-SCNC: 9 MMOL/L (ref 5–15)
BUN SERPL-MCNC: 32 MG/DL (ref 8–23)
BUN/CREAT SERPL: 58.2 (ref 7–25)
CALCIUM SPEC-SCNC: 7.9 MG/DL (ref 8.6–10.5)
CHLORIDE SERPL-SCNC: 107 MMOL/L (ref 98–107)
CO2 SERPL-SCNC: 27 MMOL/L (ref 22–29)
CREAT SERPL-MCNC: 0.55 MG/DL (ref 0.57–1)
EGFRCR SERPLBLD CKD-EPI 2021: 88.8 ML/MIN/1.73
GLUCOSE SERPL-MCNC: 132 MG/DL (ref 65–99)
HCT VFR BLD AUTO: 27.3 % (ref 34–46.6)
HGB BLD-MCNC: 8.6 G/DL (ref 12–15.9)
POTASSIUM SERPL-SCNC: 3.7 MMOL/L (ref 3.5–5.2)
SODIUM SERPL-SCNC: 143 MMOL/L (ref 136–145)

## 2025-02-18 PROCEDURE — A9270 NON-COVERED ITEM OR SERVICE: HCPCS | Performed by: INTERNAL MEDICINE

## 2025-02-18 PROCEDURE — 63710000001 MEMANTINE 5 MG TABLET: Performed by: INTERNAL MEDICINE

## 2025-02-18 PROCEDURE — 63710000001 PAROXETINE 20 MG TABLET: Performed by: INTERNAL MEDICINE

## 2025-02-18 PROCEDURE — 63710000001 CETIRIZINE 10 MG TABLET: Performed by: INTERNAL MEDICINE

## 2025-02-18 PROCEDURE — 80048 BASIC METABOLIC PNL TOTAL CA: CPT | Performed by: INTERNAL MEDICINE

## 2025-02-18 PROCEDURE — G0378 HOSPITAL OBSERVATION PER HR: HCPCS

## 2025-02-18 PROCEDURE — 63710000001 LISINOPRIL 10 MG TABLET: Performed by: INTERNAL MEDICINE

## 2025-02-18 PROCEDURE — 85014 HEMATOCRIT: CPT | Performed by: STUDENT IN AN ORGANIZED HEALTH CARE EDUCATION/TRAINING PROGRAM

## 2025-02-18 PROCEDURE — 85018 HEMOGLOBIN: CPT | Performed by: STUDENT IN AN ORGANIZED HEALTH CARE EDUCATION/TRAINING PROGRAM

## 2025-02-18 RX ADMIN — PAROXETINE 20 MG: 20 TABLET, FILM COATED ORAL at 09:26

## 2025-02-18 RX ADMIN — LISINOPRIL 10 MG: 10 TABLET ORAL at 09:25

## 2025-02-18 RX ADMIN — PANTOPRAZOLE SODIUM 40 MG: 40 INJECTION, POWDER, FOR SOLUTION INTRAVENOUS at 09:26

## 2025-02-18 RX ADMIN — Medication 10 ML: at 09:26

## 2025-02-18 RX ADMIN — CETIRIZINE HYDROCHLORIDE 10 MG: 10 TABLET, FILM COATED ORAL at 09:26

## 2025-02-18 RX ADMIN — MEMANTINE 10 MG: 5 TABLET ORAL at 09:26

## 2025-02-18 NOTE — THERAPY DISCHARGE NOTE
Acute Care - Physical Therapy Discharge Summary  [facility]       Patient Name: Rupa Hernandez  : 1935  MRN: [id number]    Today's Date: 2025                 Admit Date: 2/15/2025      PT Recommendation and Plan    Visit Dx:    ICD-10-CM ICD-9-CM   1. Gastrointestinal hemorrhage, unspecified gastrointestinal hemorrhage type  K92.2 578.9   2. Chronic anticoagulation  Z79.01 V58.61   3. Impaired functional mobility and activity tolerance  Z74.09 V49.89   4. Dysphagia, unspecified type  R13.10 787.20                PT Rehab Goals       Row Name 25 1400             Bed Mobility Goal 1 (PT)    Activity/Assistive Device (Bed Mobility Goal 1, PT) rolling to left;rolling to right;sit to supine/supine to sit;sidelying to sit/sit to sidelying  -AB      [place] Level/Cues Needed (Bed Mobility Goal 1, PT) moderate assist (50-74% patient effort)  -AB      Time Frame (Bed Mobility Goal 1, PT) long term goal (LTG);10 days  -AB      Progress/Outcomes (Bed Mobility Goal 1, PT) goal not met  -AB         ROM Goal 1 (PT)    ROM Goal 1 (PT) pt to perform AA to AROM B LEs X 8-10 reps throughout multiple planes  -AB      Time Frame (ROM Goal 1, PT) long-term goal (LTG);10 days  -AB      Progress/Outcome (ROM Goal 1, PT) goal not met  -AB         Balance Goal 1 (PT)    Activity/Assistive Device (Balance Goal) sitting static balance;sitting dynamic balance  -AB      [place] Level/Cues Needed (Balance Goal 1, PT) supervision required  -AB      Time Frame (Balance Goal 1, PT) long-term goal (LTG);other (see comments)  10 days  -AB      Progress/Outcomes (Balance Goal 1, PT) goal not met  -AB                User Key  (r) = Recorded By, (t) = Taken By, (c) = Cosigned By      Initials Name Provider Type Discipline    Katlyn Reagan, PTA Physical Therapist Assistant PT                        PT Discharge Summary  Anticipated Discharge Disposition (PT): skilled nursing facility  Reason for Discharge: Discharge from  facility  Outcomes Achieved: Refer to plan of care for updates on goals achieved  Discharge Destination: SNF      Katlyn Carson, PTA   2/18/2025

## 2025-02-18 NOTE — CASE MANAGEMENT/SOCIAL WORK
Continued Stay Note   [place]     Patient Name: Rupa Hernandez  MRN: [id number]  Today's Date: 2/18/2025    Admit Date: 2/15/2025    Plan: [facility]   Discharge Plan       Row Name 02/18/25 1036       Plan    Plan [facility]    Patient/Family in Agreement with Plan yes    Final Discharge Disposition Code 03 - skilled nursing facility (SNF)    Final Note Pt is being d/c'ed to [facility] [phone]. D/C summary was faxed to them at [phone].                   Discharge Codes    No documentation.                 Expected Discharge Date and Time       Expected Discharge Date Expected Discharge Time    Feb 18, 2025               CHIP Alcantara

## 2025-02-18 NOTE — DISCHARGE SUMMARY
[facility] Medicine Services  DISCHARGE SUMMARY       Date of Admission: 2/15/2025  Date of Discharge:  2/18/2025  Primary Care Physician: Rocco Hauser MD    Presenting Problem/History of Present Illness:  Concern for upper GI bleed    Final Discharge Diagnoses:  Active Hospital Problems    Diagnosis     GI bleed     Depression     Dementia     Essential hypertension     Mixed hyperlipidemia     PAF (paroxysmal atrial fibrillation)        Consults:   GI Dr. Deric Butterfield      Procedures Performed: EGD on 2/17/2025    Pertinent Test Results:       Imaging Results (All)       None          LAB RESULTS:      Lab 02/18/25  0806 02/17/25  2325 02/17/25  1543 02/17/25  0729 02/16/25  2318 02/16/25  0020 02/15/25  1522   WBC  --   --   --  9.70  --   --  10.21   HEMOGLOBIN 8.6* 9.2* 9.7* 9.2* 8.8*   < > 11.5*   HEMATOCRIT 27.3* 29.6* 30.7* 32.4* 28.7*   < > 37.0   PLATELETS  --   --   --  153  --   --  237   NEUTROS ABS  --   --   --   --   --   --  8.42*   IMMATURE GRANS (ABS)  --   --   --   --   --   --  0.04   LYMPHS ABS  --   --   --   --   --   --  1.26   MONOS ABS  --   --   --   --   --   --  0.48   EOS ABS  --   --   --   --   --   --  0.00   MCV  --   --   --  103.8*  --   --  93.2   PROTIME  --   --   --   --   --   --  26.3*    < > = values in this interval not displayed.         Lab 02/17/25  0939 02/17/25  0729 02/16/25  2318 02/16/25  1006 02/15/25  1522   SODIUM  --  139  --  144 144   POTASSIUM 3.9 4.5 3.5 3.2* 3.0*   CHLORIDE  --  109*  --  107 106   CO2  --  21.0*  --  29.0 23.0   ANION GAP  --  9.0  --  8.0 15.0   BUN  --  38*  --  59* 52*   CREATININE  --  0.44*  --  0.54* 0.56*   EGFR  --  93.8  --  89.2 88.5   GLUCOSE  --  110*  --  120* 233*   CALCIUM  --  8.1*  --  8.1* 8.7   MAGNESIUM  --   --   --   --  1.8         Lab 02/15/25  1522   TOTAL PROTEIN 6.3   ALBUMIN 3.6   GLOBULIN 2.7   ALT (SGPT) 20   AST (SGOT) 26   BILIRUBIN 0.3   ALK PHOS 60          Lab 02/15/25  1522   PROTIME 26.3*   INR 2.26*             Lab 02/15/25  1522   ABO TYPING A   RH TYPING Positive   ANTIBODY SCREEN Negative         Brief Urine Lab Results  (Last result in the past 365 days)        Color   Clarity   Blood   Leuk Est   Nitrite   Protein   CREAT   Urine HCG        02/15/25 1609 Dark Yellow   Clear   Negative   Trace   Negative   100 mg/dL (2+)                 Microbiology Results (last 10 days)       ** No results found for the last 240 hours. **            Hospital Course:     Patient is an 89-year-old female with past medical history of dementia, hypertension, hyperlipidemia, previous stroke, diabetes, atrial fibrillation from nursing facility who was admitted from ER complaining of dark, coffee like emesis on 2/15/2025.  She has a history of taking Xarelto per Dr. Toth.  Upon admission, patient had a glucose of 233, calcium 3.0, hemoglobin 11.5, hematocrit 37.0, WBC 10.2, Plt 237, Cr 0.56, BUN 52.  Patient was nonverbal and was not able to provide history.  GI was consulted and EGD was done with unremarkable findings. Post EGD patient had significant amount of bowel movements,, unsure if patient was severely constipated prior to admission causing emesis.  Would recommend strict bowel regimen at nursing facility.  There were no active signs of bleeding seen throughout visit and hemoglobin level stabilized during this time.  Speech evaluation was done and recommended oral diet and patient tolerated this diet.   Xarelto was held during the stay and resuming Xarelto at discharge.  Regarding future concerns of potential GI bleed, family may want to discuss with PCP and cardiologist regarding risks and benefits of continuing on Xarelto.  Patient's blood pressure elevated on 2/17 while n.p.o. for EGD and stabilized after restarting her hypertensive lisinopril home medication.  Home medication nitrofurantoin was also held during the stay and medication review is recommended with PCP  "followup.  Patient's management plan was discussed with .       Physical Exam on Discharge:  /41 (BP Location: Right arm, Patient Position: Lying)   Pulse 79   Temp 98.3 °F (36.8 °C) (Oral)   Resp 16   Ht 157.5 cm (62\")   Wt 67.3 kg (148 lb 5.9 oz) Comment: by her son  SpO2 98%   BMI 27.14 kg/m²   Physical Exam  GEN: Awake, alert, in NAD on room air  HEENT:  EOMI, Anicteric, Trachea midline  Lungs: no wheezing/rales/rhonchi  Heart: RRR, +S1/s2, no rub  ABD: soft, mildly distended with no guarding/rebound  Extremities: atraumatic, no cyanosis, no edema  Skin: no rashes or lesions  Neuro: Nonverbal, moving all extremities independently, +intentional tremor  Psych: flat affect     Condition on Discharge: Stable, no signs of active bleeding    Discharge Disposition:  Skilled Nursing Facility (DC - External)    Discharge Medications:     Discharge Medications        Changes to Medications        Instructions Start Date   acetaminophen 325 MG tablet  Commonly known as: TYLENOL  What changed: Another medication with the same name was removed. Continue taking this medication, and follow the directions you see here.   325 mg, Oral, Every 8 Hours PRN             Continue These Medications        Instructions Start Date   bisacodyl 10 MG suppository  Commonly known as: DULCOLAX   10 mg, Rectal, Daily PRN      cetirizine 10 MG tablet  Commonly known as: zyrTEC   10 mg, Oral, Daily      famotidine 20 MG tablet  Commonly known as: PEPCID   20 mg, Oral, 2 Times Daily      lisinopril 10 MG tablet  Commonly known as: PRINIVIL,ZESTRIL   10 mg, Oral, 2 Times Daily      magnesium hydroxide 400 MG/5ML suspension  Commonly known as: MILK OF MAGNESIA   30 mL, Oral, Daily PRN      memantine 10 MG tablet  Commonly known as: NAMENDA   10 mg, Oral, 2 Times Daily      miconazole 2 % powder  Commonly known as: MICOTIN   1 Application, 2 Times Daily      multivitamin tablet tablet  Commonly known as: THERAGRAN   1 tablet, " Oral, Daily      nitrofurantoin 100 MG capsule  Commonly known as: MACRODANTIN   100 mg, Oral, Nightly      PARoxetine 20 MG tablet  Commonly known as: PAXIL   20 mg, Oral, Daily      pravastatin 40 MG tablet  Commonly known as: PRAVACHOL   40 mg, Oral, Nightly      psyllium 58.6 % packet  Commonly known as: METAMUCIL   1 packet, Oral, Daily      rivaroxaban 20 MG tablet  Commonly known as: XARELTO   20 mg, Oral, Daily      zinc sulfate 220 (50 Zn) MG capsule  Commonly known as: ZINCATE   220 mg, Oral, Daily                 Discharge Diet:   Dietary Orders (From admission, onward)       Start     Ordered    02/17/25 1948  Diet: Regular/House; Fluid Consistency: Thin (IDDSI 0)  Diet Effective Now        References:    Diet Order Definitions   Question Answer Comment   Diets: Regular/House    Fluid Consistency: Thin (IDDSI 0)        02/17/25 1947                     Activity at Discharge:   As prior       Follow-up Appointments:   No future appointments.    Test Results Pending at Discharge: none    Electronically signed by Basim Lafleur DO, 02/18/25, 09:00 CST.    Time: 36 minutes.

## 2025-02-18 NOTE — THERAPY DISCHARGE NOTE
Acute Care - Occupational Therapy Discharge Summary  [facility]     Patient Name: Rupa Hernandez  : 1935  MRN: [id number]    Today's Date: 2025                 Admit Date: 2/15/2025        OT Recommendation and Plan    Visit Dx:    ICD-10-CM ICD-9-CM   1. Gastrointestinal hemorrhage, unspecified gastrointestinal hemorrhage type  K92.2 578.9   2. Chronic anticoagulation  Z79.01 V58.61   3. Impaired functional mobility and activity tolerance  Z74.09 V49.89   4. Dysphagia, unspecified type  R13.10 787.20                OT Rehab Goals       Row Name 25 1456             Transfer Goal 1 (OT)    Activity/Assistive Device (Transfer Goal 1, OT) toilet;bed-to-chair/chair-to-bed  -      Prowers Level/Cues Needed (Transfer Goal 1, OT) moderate assist (50-74% patient effort);verbal cues required  -      Time Frame (Transfer Goal 1, OT) long term goal (LTG);by discharge  -      Progress/Outcome (Transfer Goal 1, OT) goal not met;discharged from facility  -         Grooming Goal 1 (OT)    Activity/Device (Grooming Goal 1, OT) grooming skills, all  -      [place]rowers (Grooming Goal 1, OT) moderate assist (50-74% patient effort);verbal cues required;set-up required  -      Time Frame (Grooming Goal 1, OT) long term goal (LTG);by discharge  -      Progress/Outcome (Grooming Goal 1, OT) goal not met;discharged from facility  -         Self-Feeding Goal 1 (OT)    Activity/Device (Self-Feeding Goal 1, OT) self-feeding skills, all  -      P[place]ers Level/Cues Needed (Self-Feeding Goal 1, OT) minimum assist (75% or more patient effort);verbal cues required;set-up required  -      Time Frame (Self-Feeding Goal 1, OT) long term goal (LTG);by discharge  -      Progress/Outcomes (Self-Feeding Goal 1, OT) goal not met;discharged from facility  -                User Key  (r) = Recorded By, (t) = Taken By, (c) = Cosigned By      Initials Name Provider Type Discipline    Carol Thomas,  OTR/L, CSRS Occupational Therapist OT                                OT Discharge Summary  Anticipated Discharge Disposition (OT): skilled nursing facility  Reason for Discharge: Discharge from facility  Outcomes Achieved: Refer to plan of care for updates on goals achieved  Discharge Destination: SNF      RASTA Lynn/L, CSRS  2/18/2025

## 2025-02-18 NOTE — NURSING NOTE
Report called to Danielle at [facility]. Awaiting ambulance transport patient to [facility]

## 2025-02-18 NOTE — ANESTHESIA POSTPROCEDURE EVALUATION
Patient: Rupa Hernandez    Procedure Summary       Date: 02/17/25 Room / Location:  [facility] /  [facility]    Anesthesia Start: 1252 Anesthesia Stop: 1306    Procedure: ESOPHAGOGASTRODUODENOSCOPY WITH ANESTHESIA Diagnosis:       Gastrointestinal hemorrhage, unspecified gastrointestinal hemorrhage type      (Gastrointestinal hemorrhage, unspecified gastrointestinal hemorrhage type [K92.2])    Surgeons: Deric Butterfield MD Provider: Tiburcio Wiggins CRNA    Anesthesia Type: MAC ASA Status: 3            Anesthesia Type: MAC    Vitals  Vitals Value Taken Time   /73 02/17/25 1335   Temp     Pulse 80 02/17/25 1335   Resp 18 02/17/25 1335   SpO2 100 % 02/17/25 1335           Post Anesthesia Care and Evaluation    Patient location during evaluation: PHASE II  Patient participation: complete - patient participated  Level of consciousness: awake  Pain management: adequate    Airway patency: patent  Anesthetic complications: No anesthetic complications  Respiratory status: acceptable  Hydration status: acceptable

## 2025-02-18 NOTE — PROGRESS NOTES
[facility] Medicine Services  INPATIENT PROGRESS NOTE    Patient Name: Rupa Hernandez  Date of Admission: 2/15/2025  Today's Date: 02/17/25  Length of Stay: 0  Primary Care Physician: Rocco Hauser MD    Subjective   Chief Complaint:Coffee ground emesis  HPI   Patient was admitted for coffee-ground emesis.  Seen with  at bedside.  Patient was nonverbal at baseline. EGD done today was unremarkable and full diet has been resumed.  Nursing voided patient having bowel movements after EGD.        Review of Systems   Unable to assess.  Patient nonverbal.    Objective    Temp:  [97.1 °F (36.2 °C)-98.5 °F (36.9 °C)] 97.3 °F (36.3 °C)  Heart Rate:  [75-94] 93  Resp:  [14-22] 18  BP: (106-192)/() 192/90  FiO2 (%):  [100 %] 100 %  Physical Exam  GEN: Awake, alert, in NAD  HEENT:  EOMI, Anicteric, Trachea midline  Lungs: no wheezing/rales/rhonchi  Heart: RRR, +S1/s2, no rub  ABD: soft, mildly distended with no guarding/rebound  Extremities: atraumatic, no cyanosis, no edema  Skin: no rashes or lesions  Neuro: Nonverbal, moving all extremities independently  Psych: flat affect       Results Review:  I have reviewed the labs, radiology results, and diagnostic studies.    Laboratory Data:   Results from last 7 days   Lab Units 02/17/25  1543 02/17/25  0729 02/16/25  2318 02/16/25  0020 02/15/25  1522   WBC 10*3/mm3  --  9.70  --   --  10.21   HEMOGLOBIN g/dL 9.7* 9.2* 8.8*   < > 11.5*   HEMATOCRIT % 30.7* 32.4* 28.7*   < > 37.0   PLATELETS 10*3/mm3  --  153  --   --  237    < > = values in this interval not displayed.        Results from last 7 days   Lab Units 02/17/25  0939 02/17/25  0729 02/16/25  2318 02/16/25  1006 02/15/25  1522   SODIUM mmol/L  --  139  --  144 144   POTASSIUM mmol/L 3.9 4.5 3.5 3.2* 3.0*   CHLORIDE mmol/L  --  109*  --  107 106   CO2 mmol/L  --  21.0*  --  29.0 23.0   BUN mg/dL  --  38*  --  59* 52*   CREATININE mg/dL  --  0.44*  --  0.54* 0.56*   CALCIUM  "mg/dL  --  8.1*  --  8.1* 8.7   BILIRUBIN mg/dL  --   --   --   --  0.3   ALK PHOS U/L  --   --   --   --  60   ALT (SGPT) U/L  --   --   --   --  20   AST (SGOT) U/L  --   --   --   --  26   GLUCOSE mg/dL  --  110*  --  120* 233*       Culture Data:   No results found for: \"BLOODCX\", \"URINECX\", \"WOUNDCX\", \"MRSACX\", \"RESPCX\", \"STOOLCX\"    Radiology Data:   Imaging Results (Last 24 Hours)       ** No results found for the last 24 hours. **            I have reviewed the patient's current medications.     Assessment/Plan   Assessment  Active Hospital Problems    Diagnosis     GI bleed     Depression     Dementia     Essential hypertension     Mixed hyperlipidemia     PAF (paroxysmal atrial fibrillation)        Treatment Plan  1.GI bleed-H&H stable.  EGD done today showed no abnormalities.  Restarted on normal oral diet.  Nursing staff reported bowel movements after EGD.    2.Depression-continue on Paxil    3.  Essential hypertension-BP elevated to 192/90 prior with n.p.o. and no meds .  Restarted on lisinopril home dose 10 mg p.o. twice daily. Cont  monitoring.      4. PAF-Xarelto on hold due to previously suspected GI bleed.  Restart Xarelto tomorrow if no signs of bleeding given H&H stable.    5.  Dementia-continue home med memantine          Medical Decision Making  Number and Complexity of problems: 1 acute, 4 chronic  Differential Diagnosis: As above    Conditions and Status        New to me.  Patient stable to normal diet.     [facility] Data  External documents reviewed: none  Cardiac tracing (EKG, telemetry) interpretation: None   Radiology interpretation: none  Labs and EGD report reviewed  Any tests that were considered but not ordered: None     Decision rules/scores evaluated (example [id number], Wells, etc): None     Discussed with: Patient and  at bedside     Care Planning  Shared decision making: Patient unable to participate in care, spouse apprised of current labs, vitals, imaging and treatment plan. "  They are agreeable with proceeding with plans as discussed.   Code status and discussions: CPR    Disposition  Social Determinants of Health that impact treatment or disposition: nursing home   I expect the patient to be discharged to nursing home in next day or 2 days.         Electronically signed by Basim Lafleur DO, 02/17/25, 19:40 CST.

## 2025-02-18 NOTE — PLAN OF CARE
Goal Outcome Evaluation:  Plan of Care Reviewed With: patient      Progress: no change     Pt has been nonverbal but following some commands. No signs of pain/discomfort this shift thus far. Incont of bowel and bladder; voiding and multiple bm's.Turn Q2. Rm air. Bed check set. VSS. Safety maintained.

## 2025-02-19 LAB
BH BB BLOOD EXPIRATION DATE: NORMAL
BH BB BLOOD EXPIRATION DATE: NORMAL
BH BB BLOOD TYPE BARCODE: 6200
BH BB BLOOD TYPE BARCODE: 6200
BH BB DISPENSE STATUS: NORMAL
BH BB DISPENSE STATUS: NORMAL
BH BB PRODUCT CODE: NORMAL
BH BB PRODUCT CODE: NORMAL
BH BB UNIT NUMBER: NORMAL
BH BB UNIT NUMBER: NORMAL
CROSSMATCH INTERPRETATION: NORMAL
CROSSMATCH INTERPRETATION: NORMAL
UNIT  ABO: NORMAL
UNIT  ABO: NORMAL
UNIT  RH: NORMAL
UNIT  RH: NORMAL

## 2025-07-19 ENCOUNTER — HOSPITAL ENCOUNTER (INPATIENT)
Facility: HOSPITAL | Age: 89
LOS: 1 days | Discharge: NURSING, REHAB OR LONG-TERM CARE | DRG: 392 | End: 2025-07-20
Attending: FAMILY MEDICINE | Admitting: STUDENT IN AN ORGANIZED HEALTH CARE EDUCATION/TRAINING PROGRAM
Payer: MEDICARE

## 2025-07-19 ENCOUNTER — APPOINTMENT (OUTPATIENT)
Dept: CT IMAGING | Facility: HOSPITAL | Age: 89
DRG: 392 | End: 2025-07-19
Payer: MEDICARE

## 2025-07-19 DIAGNOSIS — K92.0 COFFEE GROUND EMESIS: Primary | ICD-10-CM

## 2025-07-19 PROBLEM — K59.00 CONSTIPATION: Status: ACTIVE | Noted: 2025-07-19

## 2025-07-19 PROBLEM — Z79.01 CHRONIC ANTICOAGULATION: Status: ACTIVE | Noted: 2025-07-19

## 2025-07-19 LAB
ALBUMIN SERPL-MCNC: 3.5 G/DL (ref 3.5–5.2)
ALBUMIN/GLOB SERPL: 1 G/DL
ALP SERPL-CCNC: 81 U/L (ref 39–117)
ALT SERPL W P-5'-P-CCNC: 16 U/L (ref 1–33)
ANION GAP SERPL CALCULATED.3IONS-SCNC: 12 MMOL/L (ref 5–15)
APTT PPP: 32.5 SECONDS (ref 24.5–36)
AST SERPL-CCNC: 21 U/L (ref 1–32)
BASOPHILS # BLD AUTO: 0.02 10*3/MM3 (ref 0–0.2)
BASOPHILS NFR BLD AUTO: 0.2 % (ref 0–1.5)
BILIRUB SERPL-MCNC: 0.5 MG/DL (ref 0–1.2)
BUN SERPL-MCNC: 26.3 MG/DL (ref 8–23)
BUN/CREAT SERPL: 36.5 (ref 7–25)
CALCIUM SPEC-SCNC: 9 MG/DL (ref 8.6–10.5)
CHLORIDE SERPL-SCNC: 101 MMOL/L (ref 98–107)
CO2 SERPL-SCNC: 27 MMOL/L (ref 22–29)
CREAT SERPL-MCNC: 0.72 MG/DL (ref 0.57–1)
DEPRECATED RDW RBC AUTO: 57.1 FL (ref 37–54)
EGFRCR SERPLBLD CKD-EPI 2021: 80.5 ML/MIN/1.73
EOSINOPHIL # BLD AUTO: 0 10*3/MM3 (ref 0–0.4)
EOSINOPHIL NFR BLD AUTO: 0 % (ref 0.3–6.2)
ERYTHROCYTE [DISTWIDTH] IN BLOOD BY AUTOMATED COUNT: 18 % (ref 12.3–15.4)
GLOBULIN UR ELPH-MCNC: 3.5 GM/DL
GLUCOSE SERPL-MCNC: 164 MG/DL (ref 65–99)
HCT VFR BLD AUTO: 33.2 % (ref 34–46.6)
HCT VFR BLD AUTO: 38 % (ref 34–46.6)
HGB BLD-MCNC: 10.2 G/DL (ref 12–15.9)
HGB BLD-MCNC: 11.7 G/DL (ref 12–15.9)
IMM GRANULOCYTES # BLD AUTO: 0.04 10*3/MM3 (ref 0–0.05)
IMM GRANULOCYTES NFR BLD AUTO: 0.4 % (ref 0–0.5)
INR PPP: 1.5 (ref 0.91–1.09)
LIPASE SERPL-CCNC: 19 U/L (ref 13–60)
LYMPHOCYTES # BLD AUTO: 1.16 10*3/MM3 (ref 0.7–3.1)
LYMPHOCYTES NFR BLD AUTO: 11.1 % (ref 19.6–45.3)
MAGNESIUM SERPL-MCNC: 2 MG/DL (ref 1.6–2.4)
MCH RBC QN AUTO: 26.5 PG (ref 26.6–33)
MCHC RBC AUTO-ENTMCNC: 30.8 G/DL (ref 31.5–35.7)
MCV RBC AUTO: 86 FL (ref 79–97)
MONOCYTES # BLD AUTO: 0.65 10*3/MM3 (ref 0.1–0.9)
MONOCYTES NFR BLD AUTO: 6.2 % (ref 5–12)
NEUTROPHILS NFR BLD AUTO: 8.6 10*3/MM3 (ref 1.7–7)
NEUTROPHILS NFR BLD AUTO: 82.1 % (ref 42.7–76)
NRBC BLD AUTO-RTO: 0 /100 WBC (ref 0–0.2)
PLATELET # BLD AUTO: 235 10*3/MM3 (ref 140–450)
PMV BLD AUTO: 11.7 FL (ref 6–12)
POTASSIUM SERPL-SCNC: 4.1 MMOL/L (ref 3.5–5.2)
PROT SERPL-MCNC: 7 G/DL (ref 6–8.5)
PROTHROMBIN TIME: 19 SECONDS (ref 11.8–14.8)
RBC # BLD AUTO: 4.42 10*6/MM3 (ref 3.77–5.28)
SODIUM SERPL-SCNC: 140 MMOL/L (ref 136–145)
WBC NRBC COR # BLD AUTO: 10.47 10*3/MM3 (ref 3.4–10.8)

## 2025-07-19 PROCEDURE — 74177 CT ABD & PELVIS W/CONTRAST: CPT

## 2025-07-19 PROCEDURE — 25810000003 LACTATED RINGERS SOLUTION: Performed by: FAMILY MEDICINE

## 2025-07-19 PROCEDURE — 85610 PROTHROMBIN TIME: CPT | Performed by: FAMILY MEDICINE

## 2025-07-19 PROCEDURE — 25510000001 IOPAMIDOL 61 % SOLUTION: Performed by: FAMILY MEDICINE

## 2025-07-19 PROCEDURE — 83735 ASSAY OF MAGNESIUM: CPT | Performed by: FAMILY MEDICINE

## 2025-07-19 PROCEDURE — 85730 THROMBOPLASTIN TIME PARTIAL: CPT | Performed by: FAMILY MEDICINE

## 2025-07-19 PROCEDURE — 85014 HEMATOCRIT: CPT

## 2025-07-19 PROCEDURE — 25010000002 ONDANSETRON PER 1 MG: Performed by: FAMILY MEDICINE

## 2025-07-19 PROCEDURE — 85018 HEMOGLOBIN: CPT

## 2025-07-19 PROCEDURE — 83690 ASSAY OF LIPASE: CPT | Performed by: FAMILY MEDICINE

## 2025-07-19 PROCEDURE — 99285 EMERGENCY DEPT VISIT HI MDM: CPT | Performed by: FAMILY MEDICINE

## 2025-07-19 PROCEDURE — 25810000003 LACTATED RINGERS PER 1000 ML

## 2025-07-19 PROCEDURE — 80053 COMPREHEN METABOLIC PANEL: CPT | Performed by: FAMILY MEDICINE

## 2025-07-19 PROCEDURE — 85025 COMPLETE CBC W/AUTO DIFF WBC: CPT | Performed by: FAMILY MEDICINE

## 2025-07-19 RX ORDER — ONDANSETRON 2 MG/ML
4 INJECTION INTRAMUSCULAR; INTRAVENOUS ONCE
Status: COMPLETED | OUTPATIENT
Start: 2025-07-19 | End: 2025-07-19

## 2025-07-19 RX ORDER — ESCITALOPRAM OXALATE 10 MG/1
10 TABLET ORAL DAILY
COMMUNITY

## 2025-07-19 RX ORDER — POLYETHYLENE GLYCOL 3350 17 G/17G
17 POWDER, FOR SOLUTION ORAL DAILY
Status: DISCONTINUED | OUTPATIENT
Start: 2025-07-19 | End: 2025-07-20 | Stop reason: HOSPADM

## 2025-07-19 RX ORDER — ONDANSETRON 2 MG/ML
4 INJECTION INTRAMUSCULAR; INTRAVENOUS EVERY 6 HOURS PRN
Status: DISCONTINUED | OUTPATIENT
Start: 2025-07-19 | End: 2025-07-20 | Stop reason: HOSPADM

## 2025-07-19 RX ORDER — LISINOPRIL 10 MG/1
10 TABLET ORAL 2 TIMES DAILY
Status: DISCONTINUED | OUTPATIENT
Start: 2025-07-19 | End: 2025-07-20 | Stop reason: HOSPADM

## 2025-07-19 RX ORDER — IOPAMIDOL 612 MG/ML
100 INJECTION, SOLUTION INTRAVASCULAR
Status: COMPLETED | OUTPATIENT
Start: 2025-07-19 | End: 2025-07-19

## 2025-07-19 RX ORDER — SODIUM CHLORIDE 9 MG/ML
40 INJECTION, SOLUTION INTRAVENOUS AS NEEDED
Status: DISCONTINUED | OUTPATIENT
Start: 2025-07-19 | End: 2025-07-20 | Stop reason: HOSPADM

## 2025-07-19 RX ORDER — SODIUM CHLORIDE, SODIUM LACTATE, POTASSIUM CHLORIDE, CALCIUM CHLORIDE 600; 310; 30; 20 MG/100ML; MG/100ML; MG/100ML; MG/100ML
75 INJECTION, SOLUTION INTRAVENOUS CONTINUOUS
Status: DISPENSED | OUTPATIENT
Start: 2025-07-19 | End: 2025-07-20

## 2025-07-19 RX ORDER — ESCITALOPRAM OXALATE 10 MG/1
10 TABLET ORAL DAILY
Status: DISCONTINUED | OUTPATIENT
Start: 2025-07-19 | End: 2025-07-20 | Stop reason: HOSPADM

## 2025-07-19 RX ORDER — PANTOPRAZOLE SODIUM 40 MG/10ML
80 INJECTION, POWDER, LYOPHILIZED, FOR SOLUTION INTRAVENOUS ONCE
Status: COMPLETED | OUTPATIENT
Start: 2025-07-19 | End: 2025-07-19

## 2025-07-19 RX ORDER — GUAIFENESIN 200 MG/10ML
200 LIQUID ORAL EVERY 4 HOURS PRN
COMMUNITY

## 2025-07-19 RX ORDER — NITROFURANTOIN 25; 75 MG/1; MG/1
100 CAPSULE ORAL NIGHTLY
Status: DISCONTINUED | OUTPATIENT
Start: 2025-07-19 | End: 2025-07-20 | Stop reason: HOSPADM

## 2025-07-19 RX ORDER — NITROFURANTOIN 25; 75 MG/1; MG/1
100 CAPSULE ORAL 2 TIMES DAILY
Status: ON HOLD | COMMUNITY
End: 2025-07-20 | Stop reason: SDUPTHER

## 2025-07-19 RX ORDER — PRAVASTATIN SODIUM 20 MG
40 TABLET ORAL NIGHTLY
Status: DISCONTINUED | OUTPATIENT
Start: 2025-07-19 | End: 2025-07-20 | Stop reason: HOSPADM

## 2025-07-19 RX ORDER — SODIUM CHLORIDE 0.9 % (FLUSH) 0.9 %
10 SYRINGE (ML) INJECTION AS NEEDED
Status: DISCONTINUED | OUTPATIENT
Start: 2025-07-19 | End: 2025-07-20 | Stop reason: HOSPADM

## 2025-07-19 RX ORDER — FAMOTIDINE 20 MG/1
20 TABLET, FILM COATED ORAL 2 TIMES DAILY
Status: DISCONTINUED | OUTPATIENT
Start: 2025-07-19 | End: 2025-07-20

## 2025-07-19 RX ORDER — ONDANSETRON 4 MG/1
4 TABLET, ORALLY DISINTEGRATING ORAL EVERY 6 HOURS PRN
Status: DISCONTINUED | OUTPATIENT
Start: 2025-07-19 | End: 2025-07-20 | Stop reason: HOSPADM

## 2025-07-19 RX ORDER — SODIUM CHLORIDE 0.9 % (FLUSH) 0.9 %
10 SYRINGE (ML) INJECTION EVERY 12 HOURS SCHEDULED
Status: DISCONTINUED | OUTPATIENT
Start: 2025-07-19 | End: 2025-07-20 | Stop reason: HOSPADM

## 2025-07-19 RX ORDER — BISACODYL 10 MG
10 SUPPOSITORY, RECTAL RECTAL ONCE
Status: COMPLETED | OUTPATIENT
Start: 2025-07-19 | End: 2025-07-19

## 2025-07-19 RX ORDER — DOCUSATE SODIUM 100 MG/1
200 CAPSULE, LIQUID FILLED ORAL EVERY MORNING
COMMUNITY

## 2025-07-19 RX ORDER — MEMANTINE HYDROCHLORIDE 5 MG/1
10 TABLET ORAL 2 TIMES DAILY
Status: DISCONTINUED | OUTPATIENT
Start: 2025-07-19 | End: 2025-07-20 | Stop reason: HOSPADM

## 2025-07-19 RX ADMIN — BISACODYL 10 MG: 10 SUPPOSITORY RECTAL at 18:12

## 2025-07-19 RX ADMIN — MEMANTINE HYDROCHLORIDE 10 MG: 5 TABLET, FILM COATED ORAL at 21:16

## 2025-07-19 RX ADMIN — PANTOPRAZOLE SODIUM 80 MG: 40 INJECTION, POWDER, FOR SOLUTION INTRAVENOUS at 14:51

## 2025-07-19 RX ADMIN — SODIUM CHLORIDE, POTASSIUM CHLORIDE, SODIUM LACTATE AND CALCIUM CHLORIDE 75 ML/HR: 600; 310; 30; 20 INJECTION, SOLUTION INTRAVENOUS at 18:22

## 2025-07-19 RX ADMIN — IOPAMIDOL 100 ML: 612 INJECTION, SOLUTION INTRAVENOUS at 15:40

## 2025-07-19 RX ADMIN — SODIUM CHLORIDE, POTASSIUM CHLORIDE, SODIUM LACTATE AND CALCIUM CHLORIDE 1000 ML: 600; 310; 30; 20 INJECTION, SOLUTION INTRAVENOUS at 14:51

## 2025-07-19 RX ADMIN — ONDANSETRON 4 MG: 2 INJECTION, SOLUTION INTRAMUSCULAR; INTRAVENOUS at 14:52

## 2025-07-19 RX ADMIN — POLYETHYLENE GLYCOL 3350 17 G: 17 POWDER, FOR SOLUTION ORAL at 18:21

## 2025-07-19 RX ADMIN — PRAVASTATIN SODIUM 40 MG: 20 TABLET ORAL at 21:16

## 2025-07-19 RX ADMIN — NITROFURANTOIN (MONOHYDRATE/MACROCRYSTALS) 100 MG: 25; 75 CAPSULE ORAL at 21:16

## 2025-07-19 RX ADMIN — FAMOTIDINE 20 MG: 20 TABLET, FILM COATED ORAL at 21:17

## 2025-07-19 NOTE — ED PROVIDER NOTES
Subjective   History of Present Illness  Patient has a history of dementia.  Patient's son present bedside as a primary historian.  Patient had an episode of coffee-ground emesis last night.  She lives in a nursing home.  Patient also had 1 today.  She has no fevers.  Son states that she had an episode like this before in the past and she was severely constipated.  Son states that nursing home has told her that she has been having bowel movements.  Son states that patient is anticoagulated for her A-fib.  Son denies any other symptoms at this time.      Review of Systems   Unable to perform ROS: Dementia       Past Medical History:   Diagnosis Date    Arthritis     Dementia     Dementia     Depression     Diabetes mellitus     DIET CONTROL    Hyperlipidemia     Hypertension     Rectal polyp     Stroke        Allergies   Allergen Reactions    Adhesive Tape Rash    Atorvastatin Myalgia    Gabapentin Mental Status Change    Pregabalin Mental Status Change    Codeine Rash    Lortab [Hydrocodone-Acetaminophen] Rash    Morphine And Codeine Rash    Morphine Sulfate Rash    Oxycodone-Acetaminophen Rash    Percocet [Oxycodone-Acetaminophen] Rash       Past Surgical History:   Procedure Laterality Date    APPENDECTOMY      CATARACT EXTRACTION W/ INTRAOCULAR LENS  IMPLANT, BILATERAL      CHOLECYSTECTOMY WITH INTRAOPERATIVE CHOLANGIOGRAM N/A 10/27/2022    Procedure: CHOLECYSTECTOMY LAPAROSCOPIC;  Surgeon: Mehnaz Maloney MD;  Location: [facility] OR;  Service: General;  Laterality: N/A;    COLONOSCOPY  07/14/2014    diverticulosis    DILATION AND CURETTAGE, DIAGNOSTIC / THERAPEUTIC      ENDOSCOPY N/A 2/17/2025    Procedure: ESOPHAGOGASTRODUODENOSCOPY WITH ANESTHESIA;  Surgeon: Deric Butterfield MD;  Location: [facility] ENDOSCOPY;  Service: Gastroenterology;  Laterality: N/A;  pre gib  post normal  Dr. Hauser    HYSTERECTOMY      JOINT REPLACEMENT      KNEES    KNEE SURGERY Bilateral     X2    KYPHOPLASTY N/A 7/11/2019    Procedure:  KYPHOPLASTY, Lumbar 1;  Surgeon: Marco Sandy MD;  Location: [facility] OR;  Service: Neurosurgery    OOPHORECTOMY      ROTATOR CUFF REPAIR Right        Family History   Problem Relation Age of Onset    No Known Problems Mother     No Known Problems Father     Colon cancer Paternal Grandmother        Social History     Socioeconomic History    Marital status:    Tobacco Use    Smoking status: Never    Smokeless tobacco: Never   Vaping Use    Vaping status: Never Used   Substance and Sexual Activity    Alcohol use: No    Drug use: No    Sexual activity: Defer           Objective   Physical Exam  Vitals and nursing note reviewed.   Constitutional:       Appearance: Normal appearance.   HENT:      Head: Normocephalic and atraumatic.      Mouth/Throat:      Mouth: Mucous membranes are moist.   Eyes:      Extraocular Movements: Extraocular movements intact.      Pupils: Pupils are equal, round, and reactive to light.   Cardiovascular:      Rate and Rhythm: Normal rate and regular rhythm.      Heart sounds: Normal heart sounds.   Pulmonary:      Effort: Pulmonary effort is normal.      Breath sounds: Normal breath sounds.   Abdominal:      General: Bowel sounds are normal.      Palpations: Abdomen is soft.      Tenderness: There is no abdominal tenderness.   Skin:     General: Skin is warm and dry.   Neurological:      General: No focal deficit present.      Mental Status: She is alert. Mental status is at baseline.         Procedures           ED Course                                                     Lab Results (last 24 hours)       Procedure Component Value Units Date/Time    CBC & Differential [[id number]]  (Abnormal) Collected: 07/19/25 1442    Specimen: Blood Updated: 07/19/25 1456    Narrative:      The following orders were created for panel order CBC & Differential.  Procedure                               Abnormality         Status                     ---------                                -----------         ------                     CBC Auto Differential[[id number]]        Abnormal            Final result                 Please view results for these tests on the individual orders.    Comprehensive Metabolic Panel [[id number]]  (Abnormal) Collected: 07/19/25 1442    Specimen: Blood Updated: 07/19/25 1525     Glucose 164 mg/dL      BUN 26.3 mg/dL      Creatinine 0.72 mg/dL      Sodium 140 mmol/L      Potassium 4.1 mmol/L      Chloride 101 mmol/L      CO2 27.0 mmol/L      Calcium 9.0 mg/dL      Total Protein 7.0 g/dL      Albumin 3.5 g/dL      ALT (SGPT) 16 U/L      AST (SGOT) 21 U/L      Alkaline Phosphatase 81 U/L      Total Bilirubin 0.5 mg/dL      Globulin 3.5 gm/dL      A/G Ratio 1.0 g/dL      BUN/Creatinine Ratio 36.5     Anion Gap 12.0 mmol/L      eGFR 80.5 mL/min/1.73     Narrative:      GFR Categories in Chronic Kidney Disease (CKD)              GFR Category          GFR (mL/min/1.73)    Interpretation  G1                    90 or greater        Normal or high (1)  G2                    60-89                Mild decrease (1)  G3a                   45-59                Mild to moderate decrease  G3b                   30-44                Moderate to severe decrease  G4                    15-29                Severe decrease  G5                    14 or less           Kidney failure    (1)In the absence of evidence of kidney disease, neither GFR category G1 or G2 fulfill the criteria for CKD.    eGFR calculation 2021 CKD-EPI creatinine equation, which does not include race as a factor    Protime-INR [[id number]]  (Abnormal) Collected: 07/19/25 1442    Specimen: Blood Updated: 07/19/25 1515     Protime 19.0 Seconds      INR 1.50    aPTT [[id number]]  (Normal) Collected: 07/19/25 1442    Specimen: Blood Updated: 07/19/25 1515     PTT 32.5 seconds     Narrative:      PTT = The equivalent PTT values for the therapeutic range of heparin levels at 0.3 to 0.7 U/ml are 77 - 99 seconds.    Lipase  [[id number]]  (Normal) Collected: 07/19/25 1442    Specimen: Blood Updated: 07/19/25 1520     Lipase 19 U/L     Magnesium [[id number]]  (Normal) Collected: 07/19/25 1442    Specimen: Blood Updated: 07/19/25 1524     Magnesium 2.0 mg/dL     CBC Auto Differential [[id number]]  (Abnormal) Collected: 07/19/25 1442    Specimen: Blood Updated: 07/19/25 1456     WBC 10.47 10*3/mm3      RBC 4.42 10*6/mm3      Hemoglobin 11.7 g/dL      Hematocrit 38.0 %      MCV 86.0 fL      MCH 26.5 pg      MCHC 30.8 g/dL      RDW 18.0 %      RDW-SD 57.1 fl      MPV 11.7 fL      Platelets 235 10*3/mm3      Neutrophil % 82.1 %      Lymphocyte % 11.1 %      Monocyte % 6.2 %      Eosinophil % 0.0 %      Basophil % 0.2 %      Immature Grans % 0.4 %      Neutrophils, Absolute 8.60 10*3/mm3      Lymphocytes, Absolute 1.16 10*3/mm3      Monocytes, Absolute 0.65 10*3/mm3      Eosinophils, Absolute 0.00 10*3/mm3      Basophils, Absolute 0.02 10*3/mm3      Immature Grans, Absolute 0.04 10*3/mm3      nRBC 0.0 /100 WBC           CT Abdomen Pelvis With Contrast   Final Result       1.  Large volume stool in the rectum. Colonic diverticulosis without   diverticulitis.       2.  Moderate diffuse fluid distention of the small bowel, which can be   seen in the setting of enteritis.       3.  Small to moderate size hiatal hernia.       4.  Small nonobstructing bilateral renal calculi.       5.  Trace ascites. Diffuse body wall soft tissue edema.           This report was signed and finalized on 7/19/2025 3:52 PM by Dr. Lalito Benz MD.            Medications   sodium chloride 0.9 % flush 10 mL (has no administration in time range)   bisacodyl (DULCOLAX) suppository 10 mg (has no administration in time range)   pantoprazole (PROTONIX) injection 80 mg (80 mg Intravenous Given 7/19/25 1451)   ondansetron (ZOFRAN) injection 4 mg (4 mg Intravenous Given 7/19/25 1452)   lactated ringers bolus 1,000 mL (1,000 mL Intravenous New Bag 7/19/25 4228)   iopamidol  (ISOVUE-300) 61 % injection 100 mL (100 mL Intravenous Given 7/19/25 1300)       Medical Decision Making  89-year-old female came here for 2 episodes of coffee-ground emesis.  No episodes of emesis here in the emergency room.  Case was discussed with Dr. German VALENTINO on-call.  Case was discussed with Bess LIMA with the hospitalist group.  She has accepted the patient under their group service.    Problems Addressed:  Coffee ground emesis: complicated acute illness or injury    Amount and/or Complexity of Data Reviewed  Independent Historian:      Details: Son  Labs: ordered. Decision-making details documented in ED Course.  Radiology: ordered. Decision-making details documented in ED Course.    Risk  Prescription drug management.  Decision regarding hospitalization.        Final diagnoses:   Coffee ground emesis       ED Disposition  ED Disposition       ED Disposition   Decision to Admit    Condition   --    Comment   Level of Care: Remote Telemetry [26]   Diagnosis: Coffee ground emesis [[id number]]   Admitting Physician: PADDY CHÁVEZ [[id number]]   Attending Physician: PADDY CHÁVEZ [[id number]]   Certification: I Certify That Inpatient Hospital Services Are Medically Necessary For Greater Than 2 Midnights                 No follow-up provider specified.       Medication List      No changes were made to your prescriptions during this visit.            Johnnie Vna MD  07/19/25 7922

## 2025-07-19 NOTE — PLAN OF CARE
Goal Outcome Evaluation:                 Patient alert to voice, disoriented to person, place, and situation at baseline. No skin issues noted. Attempted to disimpact patient, but only small amount of stool noted. Suppository given to patient. Patient family would like to be notified by doctor if family is not present in room. Facility contacted and waiting for med list to be faxed to update medication list.

## 2025-07-19 NOTE — H&P
[facility]  HISTORY AND PHYSICAL    Date of Admission: 7/19/2025  Primary Care Physician: Rocco Hauser MD    Subjective   Primary Historian: Patient's son Louie    Chief Complaint: Coffee ground emesis    History of Present Illness  Rupa Hernandez is an 89-year-old female with a past medical history of dementia, hypertension, hyperlipidemia, previous stroke, diabetes, atrial fibrillation on chronic Xarelto.  Recent hospitalization in February 2025 for GI bleed and hematemesis with unremarkable findings per EGD.  After EGD was performed patient had significant amounts of bowel movements and they were unsure if patient's son was severely constipated which was causing emesis.  With no active signs of bleeding seen throughout the hospitalization hemoglobin remained stable and after EGD results Xarelto was resumed at discharge.  Patient presented to [facility] emergency department 7/19/2025 with reports of multiple episodes of coffee-ground emesis overnight additional episode this morning. Case was discussed with gastroenterology who recommended hospitalist admission for evaluation and treatment and able to swallow.      Review of Systems   Constitutional:  Negative for chills and fever.   Respiratory:  Negative for shortness of breath.    Gastrointestinal:  Positive for vomiting. Negative for abdominal distention, abdominal pain and blood in stool.      Otherwise complete ROS reviewed and negative except as mentioned in the HPI.    Past Medical History:   Past Medical History:   Diagnosis Date    Arthritis     Dementia     Dementia     Depression     Diabetes mellitus     DIET CONTROL    Hyperlipidemia     Hypertension     Rectal polyp     Stroke      Past Surgical History:  Past Surgical History:   Procedure Laterality Date    APPENDECTOMY      CATARACT EXTRACTION W/ INTRAOCULAR LENS  IMPLANT, BILATERAL      CHOLECYSTECTOMY WITH INTRAOPERATIVE CHOLANGIOGRAM N/A  10/27/2022    Procedure: CHOLECYSTECTOMY LAPAROSCOPIC;  Surgeon: Mehnaz Maloney MD;  Location:  [facility] OR;  Service: General;  Laterality: N/A;    COLONOSCOPY  07/14/2014    diverticulosis    DILATION AND CURETTAGE, DIAGNOSTIC / THERAPEUTIC      ENDOSCOPY N/A 2/17/2025    Procedure: ESOPHAGOGASTRODUODENOSCOPY WITH ANESTHESIA;  Surgeon: Deric Butterfield MD;  Location:  [facility] ENDOSCOPY;  Service: Gastroenterology;  Laterality: N/A;  pre gib  post normal  Dr. Hauser    HYSTERECTOMY      JOINT REPLACEMENT      KNEES    KNEE SURGERY Bilateral     X2    KYPHOPLASTY N/A 7/11/2019    Procedure: KYPHOPLASTY, Lumbar 1;  Surgeon: Marco Sandy MD;  Location:  [facility] OR;  Service: Neurosurgery    OOPHORECTOMY      ROTATOR CUFF REPAIR Right      Social History:  reports that she has never smoked. She has never used smokeless tobacco. She reports that she does not drink alcohol and does not use drugs.    Family History: family history includes Colon cancer in her paternal grandmother; No Known Problems in her father and mother.       Allergies:  Allergies   Allergen Reactions    Adhesive Tape Rash    Atorvastatin Myalgia    Gabapentin Mental Status Change    Pregabalin Mental Status Change    Codeine Rash    Lortab [Hydrocodone-Acetaminophen] Rash    Morphine And Codeine Rash    Morphine Sulfate Rash    Oxycodone-Acetaminophen Rash    Percocet [Oxycodone-Acetaminophen] Rash       Medications:  Prior to Admission medications    Medication Sig Start Date End Date Taking? Authorizing Provider   acetaminophen (TYLENOL) 325 MG tablet Take 1 tablet by mouth Every 8 (Eight) Hours As Needed for Mild Pain or Fever.    ProviderOdalis MD   bisacodyl (DULCOLAX) 10 MG suppository Insert 1 suppository into the rectum Daily As Needed for Constipation.    ProviderOdalis MD   cetirizine (ZyrTEC) 10 MG tablet Take 1 tablet by mouth Daily.  Patient not taking: Reported on 7/19/2025    Provider, Historical, MD   docusate  sodium (COLACE) 100 MG capsule Take 1 capsule by mouth 2 (Two) Times a Day.    Odalis Mirza MD   escitalopram (LEXAPRO) 10 MG tablet Take 1 tablet by mouth Daily.    Odalis Mirza MD   famotidine (PEPCID) 20 MG tablet Take 1 tablet by mouth 2 (Two) Times a Day. 7/27/22   Odalis Mirza MD   guaifenesin (ROBITUSSIN) 100 MG/5ML liquid Take 10 mL by mouth 4 (Four) Times a Day As Needed for Cough.    Odalis Mirza MD   lisinopril (PRINIVIL,ZESTRIL) 10 MG tablet Take 1 tablet by mouth 2 (Two) Times a Day.    Odalis Mirza MD   magnesium hydroxide (MILK OF MAGNESIA) 400 MG/5ML suspension Take 30 mL by mouth Daily As Needed for Constipation.    Odalis Mirza MD   memantine (NAMENDA) 10 MG tablet Take 1 tablet by mouth 2 (Two) Times a Day.    Odalis Mirza MD   miconazole (MICOTIN) 2 % powder Apply 1 Application topically to the appropriate area as directed 2 (Two) Times a Day.    Odalis Mirza MD   Multiple Vitamin (MULTI-VITAMIN PO) Take 1 tablet by mouth Daily.    Odalis Mirza MD   nitrofurantoin (MACRODANTIN) 100 MG capsule Take 1 capsule by mouth Every Night.    Odalis Mirza MD   nitrofurantoin, macrocrystal-monohydrate, (MACROBID) 100 MG capsule Take 1 capsule by mouth 2 (Two) Times a Day.    Odalis Mirza MD   PARoxetine (PAXIL) 20 MG tablet Take 1 tablet by mouth Daily.  Patient not taking: Reported on 7/19/2025    Odalis Mirza MD   pravastatin (PRAVACHOL) 40 MG tablet Take 1 tablet by mouth Every Night.    Odalis Mirza MD   psyllium (METAMUCIL) 58.6 % packet Take 1 packet by mouth Daily.    Odalis Mirza MD   rivaroxaban (XARELTO) 20 MG tablet Take 1 tablet by mouth Daily. 10/12/16   Trevor Sanchez MD   zinc sulfate (ZINCATE) 220 (50 Zn) MG capsule Take 1 capsule by mouth Daily.    Odalis Mirza MD     I have utilized all available immediate resources to obtain, update, or review the patient's  "current medications (including all prescriptions, over-the-counter products, herbals, cannabis/cannabidiol products, and vitamin/mineral/dietary (nutritional) supplements).    Results for orders placed in visit on 11/05/18    SCANNED - ECHOCARDIOGRAM        Objective     Vital Signs: /77   Pulse 79   Temp 97.9 °F (36.6 °C) (Axillary)   Resp 17   Ht 157.5 cm (62\")   Wt 64.5 kg (142 lb 3.2 oz)   SpO2 98%   BMI 26.01 kg/m²   Physical Exam  Vitals and nursing note reviewed.   Constitutional:       General: She is not in acute distress.     Appearance: Normal appearance. She is obese. She is ill-appearing. She is not toxic-appearing.   HENT:      Head: Normocephalic and atraumatic.      Right Ear: Tympanic membrane normal.      Left Ear: Tympanic membrane normal.      Mouth/Throat:      Mouth: Mucous membranes are dry.      Pharynx: Oropharynx is clear.   Eyes:      Pupils: Pupils are equal, round, and reactive to light.   Cardiovascular:      Rate and Rhythm: Normal rate and regular rhythm.      Pulses: Normal pulses.      Heart sounds: Normal heart sounds.   Pulmonary:      Effort: Pulmonary effort is normal.      Breath sounds: Normal breath sounds.   Abdominal:      General: Abdomen is protuberant. Bowel sounds are decreased.      Palpations: Abdomen is soft.      Tenderness: There is no abdominal tenderness.   Musculoskeletal:      Right lower leg: No edema.      Left lower leg: No edema.   Skin:     General: Skin is warm.      Capillary Refill: Capillary refill takes less than 2 seconds.      Coloration: Skin is pale.   Neurological:      Mental Status: She is alert.      Comments: Patient is nonverbal at baseline, no acute abnormality per patient's son   Psychiatric:         Attention and Perception: Attention normal.         Mood and Affect: Affect is flat.         Speech: She is communicative.        Results Reviewed:  Lab Results (last 24 hours)       Procedure Component Value Units Date/Time    " Comprehensive Metabolic Panel [[id number]]  (Abnormal) Collected: 07/19/25 1442    Specimen: Blood Updated: 07/19/25 1525     Glucose 164 mg/dL      BUN 26.3 mg/dL      Creatinine 0.72 mg/dL      Sodium 140 mmol/L      Potassium 4.1 mmol/L      Chloride 101 mmol/L      CO2 27.0 mmol/L      Calcium 9.0 mg/dL      Total Protein 7.0 g/dL      Albumin 3.5 g/dL      ALT (SGPT) 16 U/L      AST (SGOT) 21 U/L      Alkaline Phosphatase 81 U/L      Total Bilirubin 0.5 mg/dL      Globulin 3.5 gm/dL      A/G Ratio 1.0 g/dL      BUN/Creatinine Ratio 36.5     Anion Gap 12.0 mmol/L      eGFR 80.5 mL/min/1.73             Magnesium [[id number]]  (Normal) Collected: 07/19/25 1442    Specimen: Blood Updated: 07/19/25 1524     Magnesium 2.0 mg/dL     Lipase [[id number]]  (Normal) Collected: 07/19/25 1442    Specimen: Blood Updated: 07/19/25 1520     Lipase 19 U/L     Protime-INR [[id number]]  (Abnormal) Collected: 07/19/25 1442    Specimen: Blood Updated: 07/19/25 1515     Protime 19.0 Seconds      INR 1.50    aPTT [[id number]]  (Normal) Collected: 07/19/25 1442    Specimen: Blood Updated: 07/19/25 1515     PTT 32.5 seconds     Narrative:      PTT = The equivalent PTT values for the therapeutic range of heparin levels at 0.3 to 0.7 U/ml are 77 - 99 seconds.    CBC & Differential [[id number]]  (Abnormal) Collected: 07/19/25 1442    Specimen: Blood Updated: 07/19/25 1456            CBC Auto Differential [[id number]]  (Abnormal) Collected: 07/19/25 1442    Specimen: Blood Updated: 07/19/25 1456     WBC 10.47 10*3/mm3      RBC 4.42 10*6/mm3      Hemoglobin 11.7 g/dL      Hematocrit 38.0 %      MCV 86.0 fL      MCH 26.5 pg      MCHC 30.8 g/dL      RDW 18.0 %      RDW-SD 57.1 fl      MPV 11.7 fL      Platelets 235 10*3/mm3      Neutrophil % 82.1 %      Lymphocyte % 11.1 %      Monocyte % 6.2 %      Eosinophil % 0.0 %      Basophil % 0.2 %      Immature Grans % 0.4 %      Neutrophils, Absolute 8.60 10*3/mm3      Lymphocytes, Absolute 1.16 10*3/mm3       Monocytes, Absolute 0.65 10*3/mm3      Eosinophils, Absolute 0.00 10*3/mm3      Basophils, Absolute 0.02 10*3/mm3      Immature Grans, Absolute 0.04 10*3/mm3      nRBC 0.0 /100 WBC           Imaging Results (Last 24 Hours)       Procedure Component Value Units Date/Time    CT Abdomen Pelvis With Contrast [[id number]] Collected: 07/19/25 1545     Updated: 07/19/25 1555    Narrative:      EXAM/TECHNIQUE: CT abdomen and pelvis with IV contrast     INDICATION: Coffee ground emesis     COMPARISON: None available.     DOSE LENGTH PRODUCT: 848.49 mGy.cm. Automated exposure control was  utilized to decrease patient radiation dose.     FINDINGS:     LOWER CHEST: No acute findings.     LIVER AND BILIARY: No suspicious liver lesion. The gallbladder is  surgically absent. No biliary dilatation.     PANCREAS: Unremarkable.      SPLEEN: Unremarkable.      ADRENAL: Unremarkable.     KIDNEYS/BLADDER: No solid renal mass. Punctate nonobstructing left lower  pole renal calculus. 4 mm right upper pole nonobstructing renal  calculus. No ureteral stone or hydronephrosis. No focal urinary bladder  wall thickening.      BOWEL: Large rectal stool conglomerate sigmoid diverticulosis without  diverticulitis. Prior appendectomy. Small to moderate sized hiatal  hernia. No focal area of gastric wall thickening. Mild diffuse fluid  distention of the small bowel, without evidence of bowel obstruction.  2.5 cm noncalcified linear object in the distal small bowel is likely  ingested material.     PERITONEUM: Trace ascites. No pneumoperitoneum.     PELVIC ORGANS: Prior hysterectomy. No pelvic mass.     VASCULATURE: Nonaneurysmal atherosclerotic abdominal aorta. SMA is  widely patent.     LYMPH NODES: No enlarged abdominal or pelvic lymph nodes.     SOFT TISSUES: Mild diffuse body wall soft tissue edema.     BONES: Chronic L1 compression deformity status post kyphoplasty.       Impression:         1.  Large volume stool in the rectum. Colonic  diverticulosis without  diverticulitis.     2.  Moderate diffuse fluid distention of the small bowel, which can be  seen in the setting of enteritis.     3.  Small to moderate size hiatal hernia.     4.  Small nonobstructing bilateral renal calculi.     5.  Trace ascites. Diffuse body wall soft tissue edema.        This report was signed and finalized on 7/19/2025 3:52 PM by Dr. Lalito Benz MD.               Assessment / Plan   Assessment:   Active Hospital Problems    Diagnosis     **Hematemesis     Constipation     Chronic anticoagulation     PAF (paroxysmal atrial fibrillation)     Essential hypertension        Treatment Plan  The patient will be admitted to Dr. Perez's service here at [facility].     Hematemesis/constipation  - GI consultation for evaluation.  - Clear liquid diet.  - H/H every 8 x 2, currently hemoglobin 11.7 which is significantly higher than last year's previous Hb.  - Stat suppository with manual disimpaction per nursing staff.  - Continue daily osmotic laxative.  - GI panel pending  - Notify provider immediately of any new or worsening    Paroxysmal atrial fibrillation/chronic anticoagulation  -No antiarrhythmics at baseline.  - Hold Xarelto until evaluated by GI and/or stable Hb confirmed.    Essential hypertension  - Initiate every 4 vital signs.  - Continue home lisinopril    Reviewed and restarted home medications as appropriate.    VTE prophylaxis with SCDs  Labs in a.m.    Medical Decision Making  2 acute on chronic, high complexity, unchanged  3 chronic, moderate complexity, unchanged      Number and Complexity of problems: 5  Differential Diagnosis: None    Conditions and Status        Condition is unchanged.     [facility] Data  External documents reviewed: [employer]  Cardiac tracing (EKG, telemetry) interpretation: EKG pending  Radiology interpretation: 7/19/2025 reviewed  Labs reviewed: 7/19/2025 reviewed  Any tests that were considered but not ordered: None      Decision rules/scores evaluated (example [id number], [place], etc): None     Discussed with: Dr. Perez, patient and her son Louie     Care Planning  Shared decision making: Dr. Perez, patient and her son Louie  Code status and discussions: Full code per patient's son    Disposition  Social Determinants of Health that impact treatment or disposition: Patient may return to skilled nursing facility at discharge  Estimated length of stay is 1-2 days.     I confirmed that the patient's advanced care plan is present, code status is documented, and a surrogate decision maker is listed in the patient's medical record.     The patient's surrogate decision maker is her  Fam.     The patient was seen and examined by me on 7/19/2025 at 1619.    Electronically signed by RUMA Lopez, 07/19/25, 17:46 CDT.

## 2025-07-20 VITALS
HEART RATE: 66 BPM | HEIGHT: 62 IN | RESPIRATION RATE: 18 BRPM | SYSTOLIC BLOOD PRESSURE: 130 MMHG | WEIGHT: 148.9 LBS | DIASTOLIC BLOOD PRESSURE: 75 MMHG | TEMPERATURE: 98.4 F | OXYGEN SATURATION: 98 % | BODY MASS INDEX: 27.4 KG/M2

## 2025-07-20 LAB
ALBUMIN SERPL-MCNC: 2.8 G/DL (ref 3.5–5.2)
ALBUMIN/GLOB SERPL: 1 G/DL
ALP SERPL-CCNC: 64 U/L (ref 39–117)
ALT SERPL W P-5'-P-CCNC: 10 U/L (ref 1–33)
ANION GAP SERPL CALCULATED.3IONS-SCNC: 8 MMOL/L (ref 5–15)
AST SERPL-CCNC: 15 U/L (ref 1–32)
BASOPHILS # BLD MANUAL: 0 10*3/MM3 (ref 0–0.2)
BASOPHILS NFR BLD MANUAL: 0 % (ref 0–1.5)
BILIRUB SERPL-MCNC: 0.5 MG/DL (ref 0–1.2)
BUN SERPL-MCNC: 21.3 MG/DL (ref 8–23)
BUN/CREAT SERPL: 38.7 (ref 7–25)
CALCIUM SPEC-SCNC: 8.4 MG/DL (ref 8.6–10.5)
CHLORIDE SERPL-SCNC: 103 MMOL/L (ref 98–107)
CO2 SERPL-SCNC: 26 MMOL/L (ref 22–29)
CREAT SERPL-MCNC: 0.55 MG/DL (ref 0.57–1)
DEPRECATED RDW RBC AUTO: 57 FL (ref 37–54)
EGFRCR SERPLBLD CKD-EPI 2021: 88.3 ML/MIN/1.73
EOSINOPHIL # BLD MANUAL: 0 10*3/MM3 (ref 0–0.4)
EOSINOPHIL NFR BLD MANUAL: 0 % (ref 0.3–6.2)
ERYTHROCYTE [DISTWIDTH] IN BLOOD BY AUTOMATED COUNT: 17.8 % (ref 12.3–15.4)
GLOBULIN UR ELPH-MCNC: 2.9 GM/DL
GLUCOSE SERPL-MCNC: 139 MG/DL (ref 65–99)
HBA1C MFR BLD: 5.8 % (ref 4.8–5.6)
HCT VFR BLD AUTO: 31.7 % (ref 34–46.6)
HGB BLD-MCNC: 9.7 G/DL (ref 12–15.9)
LYMPHOCYTES # BLD MANUAL: 0.44 10*3/MM3 (ref 0.7–3.1)
LYMPHOCYTES NFR BLD MANUAL: 11 % (ref 5–12)
MAGNESIUM SERPL-MCNC: 1.9 MG/DL (ref 1.6–2.4)
MCH RBC QN AUTO: 26.6 PG (ref 26.6–33)
MCHC RBC AUTO-ENTMCNC: 30.6 G/DL (ref 31.5–35.7)
MCV RBC AUTO: 86.8 FL (ref 79–97)
MONOCYTES # BLD: 1.22 10*3/MM3 (ref 0.1–0.9)
NEUTROPHILS # BLD AUTO: 9.39 10*3/MM3 (ref 1.7–7)
NEUTROPHILS NFR BLD MANUAL: 78 % (ref 42.7–76)
NEUTS BAND NFR BLD MANUAL: 7 % (ref 0–5)
PLAT MORPH BLD: NORMAL
PLATELET # BLD AUTO: 188 10*3/MM3 (ref 140–450)
PMV BLD AUTO: 11.2 FL (ref 6–12)
POTASSIUM SERPL-SCNC: 4 MMOL/L (ref 3.5–5.2)
PROT SERPL-MCNC: 5.7 G/DL (ref 6–8.5)
RBC # BLD AUTO: 3.65 10*6/MM3 (ref 3.77–5.28)
RBC MORPH BLD: NORMAL
SODIUM SERPL-SCNC: 137 MMOL/L (ref 136–145)
VARIANT LYMPHS NFR BLD MANUAL: 1 % (ref 0–5)
VARIANT LYMPHS NFR BLD MANUAL: 3 % (ref 19.6–45.3)
WBC MORPH BLD: NORMAL
WBC NRBC COR # BLD AUTO: 11.05 10*3/MM3 (ref 3.4–10.8)

## 2025-07-20 PROCEDURE — 83735 ASSAY OF MAGNESIUM: CPT

## 2025-07-20 PROCEDURE — 85007 BL SMEAR W/DIFF WBC COUNT: CPT

## 2025-07-20 PROCEDURE — 85027 COMPLETE CBC AUTOMATED: CPT

## 2025-07-20 PROCEDURE — 99222 1ST HOSP IP/OBS MODERATE 55: CPT | Performed by: INTERNAL MEDICINE

## 2025-07-20 PROCEDURE — 97166 OT EVAL MOD COMPLEX 45 MIN: CPT

## 2025-07-20 PROCEDURE — 83036 HEMOGLOBIN GLYCOSYLATED A1C: CPT

## 2025-07-20 PROCEDURE — 80053 COMPREHEN METABOLIC PANEL: CPT

## 2025-07-20 RX ORDER — PANTOPRAZOLE SODIUM 40 MG/10ML
40 INJECTION, POWDER, LYOPHILIZED, FOR SOLUTION INTRAVENOUS
Status: DISCONTINUED | OUTPATIENT
Start: 2025-07-20 | End: 2025-07-20 | Stop reason: HOSPADM

## 2025-07-20 RX ORDER — PANTOPRAZOLE SODIUM 40 MG/1
40 TABLET, DELAYED RELEASE ORAL
Start: 2025-07-20

## 2025-07-20 RX ORDER — NITROFURANTOIN 25; 75 MG/1; MG/1
100 CAPSULE ORAL
COMMUNITY

## 2025-07-20 RX ADMIN — LISINOPRIL 10 MG: 10 TABLET ORAL at 09:05

## 2025-07-20 RX ADMIN — MEMANTINE HYDROCHLORIDE 10 MG: 5 TABLET, FILM COATED ORAL at 09:05

## 2025-07-20 RX ADMIN — ESCITALOPRAM OXALATE 10 MG: 10 TABLET ORAL at 09:05

## 2025-07-20 RX ADMIN — FAMOTIDINE 20 MG: 20 TABLET, FILM COATED ORAL at 09:05

## 2025-07-20 RX ADMIN — PANTOPRAZOLE SODIUM 40 MG: 40 INJECTION, POWDER, FOR SOLUTION INTRAVENOUS at 10:36

## 2025-07-20 RX ADMIN — Medication 10 ML: at 09:05

## 2025-07-20 NOTE — DISCHARGE SUMMARY
[facility] Medicine Services  DISCHARGE SUMMARY       Date of Admission: 7/19/2025  Date of Discharge:  7/20/2025  Primary Care Physician: Rocco Hauser MD    Presenting Problem/History of Present Illness:  Coffee-ground emesis    Final Discharge Diagnoses:  Active Hospital Problems    Diagnosis     **Hematemesis     Constipation     Chronic anticoagulation     PAF (paroxysmal atrial fibrillation)     Essential hypertension        Consults: Gastroenterology    Procedures Performed: None    Pertinent Test Results:   No results found for this or any previous visit.      Imaging Results (All)       Procedure Component Value Units Date/Time    CT Abdomen Pelvis With Contrast [[id number]] Collected: 07/19/25 1545     Updated: 07/19/25 1555    Narrative:      EXAM/TECHNIQUE: CT abdomen and pelvis with IV contrast     INDICATION: Coffee ground emesis     COMPARISON: None available.     DOSE LENGTH PRODUCT: 848.49 mGy.cm. Automated exposure control was  utilized to decrease patient radiation dose.     FINDINGS:     LOWER CHEST: No acute findings.     LIVER AND BILIARY: No suspicious liver lesion. The gallbladder is  surgically absent. No biliary dilatation.     PANCREAS: Unremarkable.      SPLEEN: Unremarkable.      ADRENAL: Unremarkable.     KIDNEYS/BLADDER: No solid renal mass. Punctate nonobstructing left lower  pole renal calculus. 4 mm right upper pole nonobstructing renal  calculus. No ureteral stone or hydronephrosis. No focal urinary bladder  wall thickening.      BOWEL: Large rectal stool conglomerate sigmoid diverticulosis without  diverticulitis. Prior appendectomy. Small to moderate sized hiatal  hernia. No focal area of gastric wall thickening. Mild diffuse fluid  distention of the small bowel, without evidence of bowel obstruction.  2.5 cm noncalcified linear object in the distal small bowel is likely  ingested material.     PERITONEUM: Trace ascites. No  pneumoperitoneum.     PELVIC ORGANS: Prior hysterectomy. No pelvic mass.     VASCULATURE: Nonaneurysmal atherosclerotic abdominal aorta. SMA is  widely patent.     LYMPH NODES: No enlarged abdominal or pelvic lymph nodes.     SOFT TISSUES: Mild diffuse body wall soft tissue edema.     BONES: Chronic L1 compression deformity status post kyphoplasty.       Impression:         1.  Large volume stool in the rectum. Colonic diverticulosis without  diverticulitis.     2.  Moderate diffuse fluid distention of the small bowel, which can be  seen in the setting of enteritis.     3.  Small to moderate size hiatal hernia.     4.  Small nonobstructing bilateral renal calculi.     5.  Trace ascites. Diffuse body wall soft tissue edema.        This report was signed and finalized on 7/19/2025 3:52 PM by Dr. Lalito Benz MD.             LAB RESULTS:      Lab 07/20/25  0521 07/19/25  2328 07/19/25  1442   WBC 11.05*  --  10.47   HEMOGLOBIN 9.7* 10.2* 11.7*   HEMATOCRIT 31.7* 33.2* 38.0   PLATELETS 188  --  235   NEUTROS ABS 9.39*  --  8.60*   IMMATURE GRANS (ABS)  --   --  0.04   LYMPHS ABS  --   --  1.16   MONOS ABS  --   --  0.65   EOS ABS 0.00  --  0.00   MCV 86.8  --  86.0   PROTIME  --   --  19.0*   APTT  --   --  32.5         Lab 07/20/25  0521 07/19/25  1442   SODIUM 137 140   POTASSIUM 4.0 4.1   CHLORIDE 103 101   CO2 26.0 27.0   ANION GAP 8.0 12.0   BUN 21.3 26.3*   CREATININE 0.55* 0.72   EGFR 88.3 80.5   GLUCOSE 139* 164*   CALCIUM 8.4* 9.0   MAGNESIUM 1.9 2.0   HEMOGLOBIN A1C 5.80*  --          Lab 07/20/25  0521 07/19/25  1442   TOTAL PROTEIN 5.7* 7.0   ALBUMIN 2.8* 3.5   GLOBULIN 2.9 3.5   ALT (SGPT) 10 16   AST (SGOT) 15 21   BILIRUBIN 0.5 0.5   ALK PHOS 64 81   LIPASE  --  19         Lab 07/19/25  1442   PROTIME 19.0*   INR 1.50*                 Brief Urine Lab Results  (Last result in the past 365 days)        Color   Clarity   Blood   Leuk Est   Nitrite   Protein   CREAT   Urine HCG        02/15/25 1609 Dark  "Yellow   Clear   Negative   Trace   Negative   100 mg/dL (2+)                 Microbiology Results (last 10 days)       ** No results found for the last 240 hours. **            Hospital Course:       Rupa Hernandez is an 89-year-old female with a past medical history of dementia, hypertension, hyperlipidemia, previous stroke, diabetes, atrial fibrillation on chronic Xarelto. Recent hospitalization in February 2025 for GI bleed and hematemesis with unremarkable findings per EGD.  She presented again with complaints of coffee-ground emesis from nursing home.  Hemoglobin remained stable.  She was evaluated by gastroenterology who did not think this warranted a repeat EGD given the recent negative findings.  Current symptoms thought to be due to Telly erosions or Dariana-Ribeiro tear.  She will be discharged with Protonix twice daily, and her Xarelto can be resumed.  She was also found to have heavy stool burden on CT and was given laxative treatments this admission.  Constipation was likely contributing to nausea and vomiting.  Continue daily stool softener, Dulcolax suppository as needed.  Psylum fiber was discontinued due to possible worsening since it is bulk forming agent.  Recommend repeat CBC in a week to ensure stable hemoglobin.    Additionally, there is mention of a flutter on the chart for which she is on Xarelto.  During this admission, telemetry reported sinus rhythm with multiple PVCs but no evidence of atrial fibrillation.  Would recommend follow-up with cardiology.    Patient has reached the maximum benefit of hospitalization and is stable for discharge.    Physical Exam on Discharge:  /75 (BP Location: Right leg, Patient Position: Lying)   Pulse 66   Temp 98.4 °F (36.9 °C) (Oral)   Resp 18   Ht 157.5 cm (62\")   Wt 67.5 kg (148 lb 14.4 oz)   SpO2 98%   BMI 27.23 kg/m²   Physical Exam  GEN: Awake, alert, nonverbal, no acute distress  HEENT: Atraumatic, EOMI, Anicteric  Lungs: CTAB, no " wheezing/rales/rhonchi  Heart: RRR, +S1/s2, no rub  ABD: soft, nt/nd, +BS, no guarding/rebound  Extremities: atraumatic, no cyanosis, no edema  Skin: no rashes or lesions  Neuro: Nonverbal  Psych: normal mood & affect      Condition on Discharge: Stable    Discharge Disposition:  Skilled Nursing Facility (DC - External)    Discharge Medications:     Discharge Medications        PAUSE taking these medications        Instructions Start Date   rivaroxaban 20 MG tablet  Wait to take this until: July 21, 2025 Morning  Commonly known as: XARELTO   20 mg, Oral, Nightly             New Medications        Instructions Start Date   pantoprazole 40 MG EC tablet  Commonly known as: Protonix   40 mg, Oral, 2 Times Daily Before Meals             Continue These Medications        Instructions Start Date   acetaminophen 325 MG tablet  Commonly known as: TYLENOL   325 mg, Every 8 Hours PRN      bisacodyl 10 MG suppository  Commonly known as: DULCOLAX   10 mg, Daily PRN      docusate sodium 100 MG capsule  Commonly known as: COLACE   200 mg, Oral, Every Morning      escitalopram 10 MG tablet  Commonly known as: LEXAPRO   10 mg, Oral, Daily      guaifenesin 100 MG/5ML liquid  Commonly known as: ROBITUSSIN   200 mg, Oral, Every 4 Hours PRN      lisinopril 10 MG tablet  Commonly known as: PRINIVIL,ZESTRIL   10 mg, 2 Times Daily      magnesium hydroxide 400 MG/5ML suspension  Commonly known as: MILK OF MAGNESIA   30 mL, Oral, Every 3 Days      memantine 10 MG tablet  Commonly known as: NAMENDA   10 mg, 2 Times Daily      miconazole 2 % powder  Commonly known as: MICOTIN   1 Application, 2 Times Daily      multivitamin tablet tablet  Commonly known as: THERAGRAN   1 tablet, Daily      nitrofurantoin (macrocrystal-monohydrate) 100 MG capsule  Commonly known as: MACROBID   100 mg, Oral, Every Night at Bedtime      pravastatin 40 MG tablet  Commonly known as: PRAVACHOL   40 mg, Nightly      zinc sulfate 220 (50 Zn) MG capsule  Commonly known  as: ZINCATE   220 mg, Daily             Stop These Medications      famotidine 20 MG tablet  Commonly known as: PEPCID     psyllium 58.6 % packet  Commonly known as: METAMUCIL            Discharge Diet:   Dietary Orders (From admission, onward)       Start     Ordered    07/19/25 6974  Diet: Liquid; Clear Liquid; Fluid Consistency: Thin (IDDSI 0)  Diet Effective Now        References:    Diet Order Definitions   Question Answer Comment   Diets: Liquid    Liquid Diet: Clear Liquid    Fluid Consistency: Thin (IDDSI 0)        07/19/25 6915                    Activity at Discharge: As tolerated    Follow-up Appointments:   No future appointments.    Test Results Pending at Discharge: None    Electronically signed by Sachin Perez MD, 07/20/25, 12:47 CDT.    Time: 36 minutes.

## 2025-07-20 NOTE — CASE MANAGEMENT/SOCIAL WORK
"Continued Stay Note  [facility]     Patient Name: Rupa Hernandez  MRN: [id number]  Today's Date: 7/20/2025    Admit Date: 7/19/2025    Plan: [facility]   Discharge Plan       Row Name 07/20/25 1303       Plan    Plan [facility]    Patient/Family in Agreement with Plan yes    Final Discharge Disposition Code 03 - skilled nursing facility (SNF)    Final Note Pt is being discharged back to [facility] today, spoke with Cesar at facility to inform, she stated to try to fax but machine not working and to send copy with her, GRETA Westbrook aware.  Pt will need to travel via [facility] [phone].  Staff saying spouse here and aware of return back today, SW already left message for him to inform as Fam Persaud \"POA\" Spouse [phone].    [facility]   [phone]   Fax: [phone] / [phone] (preferred)                       Discharge Codes    No documentation.                 Expected Discharge Date and Time       Expected Discharge Date Expected Discharge Time    Jul 20, 2025               LAUREN Beckett    "

## 2025-07-20 NOTE — DISCHARGE PLACEMENT REQUEST
"Rupa Asencio (89 y.o. Female)       Date of Birth   1935    Social Security Number       Address   [address] [place] [address]    Home Phone   [phone]    MRN   [id number]       Confucianist   Christianity    Marital Status                               Admission Date   7/19/2025    Admission Type   Emergency    Admitting Provider   Sachin Perez MD    Attending Provider   Sachin Perez MD    Department, Room/Bed   [facility] 3C, 371/1       Discharge Date       Discharge Disposition   Skilled Nursing Facility (DC - External)    Discharge Destination                                 Attending Provider: Sachin Perez MD    Allergies: Adhesive Tape, Atorvastatin, Gabapentin, Pregabalin, Codeine, Lortab [Hydrocodone-acetaminophen], Morphine And Codeine, Morphine Sulfate, Oxycodone-acetaminophen, Percocet [Oxycodone-acetaminophen]    Isolation: None   Infection: None   Code Status: CPR    Ht: 157.5 cm (62\")   Wt: 67.5 kg (148 lb 14.4 oz)    Admission Cmt: None   Principal Problem: Hematemesis [K92.0]                   Active Insurance as of 7/19/2025       Primary Coverage       Payor Plan Insurance Group Employer/Plan Group    MEDICARE MEDICARE A & B        Payor Plan Address Payor Plan Phone Number Payor Plan Fax Number Effective Dates    [address] [phone]  6/1/2002 - None Entered    [facility] [id number]         Subscriber Name Subscriber Birth Date Member ID       RUPA ASENCIO 1935 [id number]               Secondary Coverage       Payor Plan Insurance Group Employer/Plan Group    [facility] SUPP [id number]       Payor Plan Address Payor Plan Phone Number Payor Plan Fax Number Effective Dates    [address]   12/1/2020 - None Entered    [facility] [id number]         Subscriber Name Subscriber Birth Date Member ID       RUPA ASENCIO 1935 [id number]                     Emergency Contacts        (Rel.) Home Phone Work Phone " "Mobile Phone    Fam Hernandez \"POA\" (Spouse) [phone] -- --    Louie Hernandez (Son) [phone] -- [phone]                 Discharge Summary        Sachin Perez MD at 07/20/25 1247                [facility] Medicine Services  DISCHARGE SUMMARY       Date of Admission: 7/19/2025  Date of Discharge:  7/20/2025  Primary Care Physician: Rocco Hauser MD    Presenting Problem/History of Present Illness:  Coffee-ground emesis    Final Discharge Diagnoses:  Active Hospital Problems    Diagnosis     **Hematemesis     Constipation     Chronic anticoagulation     PAF (paroxysmal atrial fibrillation)     Essential hypertension        Consults: Gastroenterology    Procedures Performed: None    Pertinent Test Results:   No results found for this or any previous visit.      Imaging Results (All)       Procedure Component Value Units Date/Time    CT Abdomen Pelvis With Contrast [[id number]] Collected: 07/19/25 1545     Updated: 07/19/25 1555    Narrative:      EXAM/TECHNIQUE: CT abdomen and pelvis with IV contrast     INDICATION: Coffee ground emesis     COMPARISON: None available.     DOSE LENGTH PRODUCT: 848.49 mGy.cm. Automated exposure control was  utilized to decrease patient radiation dose.     FINDINGS:     LOWER CHEST: No acute findings.     LIVER AND BILIARY: No suspicious liver lesion. The gallbladder is  surgically absent. No biliary dilatation.     PANCREAS: Unremarkable.      SPLEEN: Unremarkable.      ADRENAL: Unremarkable.     KIDNEYS/BLADDER: No solid renal mass. Punctate nonobstructing left lower  pole renal calculus. 4 mm right upper pole nonobstructing renal  calculus. No ureteral stone or hydronephrosis. No focal urinary bladder  wall thickening.      BOWEL: Large rectal stool conglomerate sigmoid diverticulosis without  diverticulitis. Prior appendectomy. Small to moderate sized hiatal  hernia. No focal area of gastric wall thickening. Mild diffuse " fluid  distention of the small bowel, without evidence of bowel obstruction.  2.5 cm noncalcified linear object in the distal small bowel is likely  ingested material.     PERITONEUM: Trace ascites. No pneumoperitoneum.     PELVIC ORGANS: Prior hysterectomy. No pelvic mass.     VASCULATURE: Nonaneurysmal atherosclerotic abdominal aorta. SMA is  widely patent.     LYMPH NODES: No enlarged abdominal or pelvic lymph nodes.     SOFT TISSUES: Mild diffuse body wall soft tissue edema.     BONES: Chronic L1 compression deformity status post kyphoplasty.       Impression:         1.  Large volume stool in the rectum. Colonic diverticulosis without  diverticulitis.     2.  Moderate diffuse fluid distention of the small bowel, which can be  seen in the setting of enteritis.     3.  Small to moderate size hiatal hernia.     4.  Small nonobstructing bilateral renal calculi.     5.  Trace ascites. Diffuse body wall soft tissue edema.        This report was signed and finalized on 7/19/2025 3:52 PM by Dr. Lalito Benz MD.             LAB RESULTS:      Lab 07/20/25  0521 07/19/25  2328 07/19/25  1442   WBC 11.05*  --  10.47   HEMOGLOBIN 9.7* 10.2* 11.7*   HEMATOCRIT 31.7* 33.2* 38.0   PLATELETS 188  --  235   NEUTROS ABS 9.39*  --  8.60*   IMMATURE GRANS (ABS)  --   --  0.04   LYMPHS ABS  --   --  1.16   MONOS ABS  --   --  0.65   EOS ABS 0.00  --  0.00   MCV 86.8  --  86.0   PROTIME  --   --  19.0*   APTT  --   --  32.5         Lab 07/20/25  0521 07/19/25  1442   SODIUM 137 140   POTASSIUM 4.0 4.1   CHLORIDE 103 101   CO2 26.0 27.0   ANION GAP 8.0 12.0   BUN 21.3 26.3*   CREATININE 0.55* 0.72   EGFR 88.3 80.5   GLUCOSE 139* 164*   CALCIUM 8.4* 9.0   MAGNESIUM 1.9 2.0   HEMOGLOBIN A1C 5.80*  --          Lab 07/20/25  0521 07/19/25  1442   TOTAL PROTEIN 5.7* 7.0   ALBUMIN 2.8* 3.5   GLOBULIN 2.9 3.5   ALT (SGPT) 10 16   AST (SGOT) 15 21   BILIRUBIN 0.5 0.5   ALK PHOS 64 81   LIPASE  --  19         Lab 07/19/25  1442   PROTIME  19.0*   INR 1.50*                 Brief Urine Lab Results  (Last result in the past 365 days)        Color   Clarity   Blood   Leuk Est   Nitrite   Protein   CREAT   Urine HCG        02/15/25 1609 Dark Yellow   Clear   Negative   Trace   Negative   100 mg/dL (2+)                 Microbiology Results (last 10 days)       ** No results found for the last 240 hours. **            Hospital Course:       Rupa Hernandez is an 89-year-old female with a past medical history of dementia, hypertension, hyperlipidemia, previous stroke, diabetes, atrial fibrillation on chronic Xarelto. Recent hospitalization in February 2025 for GI bleed and hematemesis with unremarkable findings per EGD.  She presented again with complaints of coffee-ground emesis from nursing home.  Hemoglobin remained stable.  She was evaluated by gastroenterology who did not think this warranted a repeat EGD given the recent negative findings.  Current symptoms thought to be due to Telly erosions or Dariana-Ribeiro tear.  She will be discharged with Protonix twice daily, and her Xarelto can be resumed.  She was also found to have heavy stool burden on CT and was given laxative treatments this admission.  Constipation was likely contributing to nausea and vomiting.  Continue daily stool softener, Dulcolax suppository as needed.  Psylum fiber was discontinued due to possible worsening since it is bulk forming agent.  Recommend repeat CBC in a week to ensure stable hemoglobin.    Additionally, there is mention of a flutter on the chart for which she is on Xarelto.  During this admission, telemetry reported sinus rhythm with multiple PVCs but no evidence of atrial fibrillation.  Would recommend follow-up with cardiology.    Patient has reached the maximum benefit of hospitalization and is stable for discharge.    Physical Exam on Discharge:  /75 (BP Location: Right leg, Patient Position: Lying)   Pulse 66   Temp 98.4 °F (36.9 °C) (Oral)   Resp 18   Ht  "157.5 cm (62\")   Wt 67.5 kg (148 lb 14.4 oz)   SpO2 98%   BMI 27.23 kg/m²   Physical Exam  GEN: Awake, alert, nonverbal, no acute distress  HEENT: Atraumatic, EOMI, Anicteric  Lungs: CTAB, no wheezing/rales/rhonchi  Heart: RRR, +S1/s2, no rub  ABD: soft, nt/nd, +BS, no guarding/rebound  Extremities: atraumatic, no cyanosis, no edema  Skin: no rashes or lesions  Neuro: Nonverbal  Psych: normal mood & affect      Condition on Discharge: Stable    Discharge Disposition:  Skilled Nursing Facility (DC - External)    Discharge Medications:     Discharge Medications        PAUSE taking these medications        Instructions Start Date   rivaroxaban 20 MG tablet  Wait to take this until: July 21, 2025 Morning  Commonly known as: XARELTO   20 mg, Oral, Nightly             New Medications        Instructions Start Date   pantoprazole 40 MG EC tablet  Commonly known as: Protonix   40 mg, Oral, 2 Times Daily Before Meals             Continue These Medications        Instructions Start Date   acetaminophen 325 MG tablet  Commonly known as: TYLENOL   325 mg, Every 8 Hours PRN      bisacodyl 10 MG suppository  Commonly known as: DULCOLAX   10 mg, Daily PRN      docusate sodium 100 MG capsule  Commonly known as: COLACE   200 mg, Oral, Every Morning      escitalopram 10 MG tablet  Commonly known as: LEXAPRO   10 mg, Oral, Daily      guaifenesin 100 MG/5ML liquid  Commonly known as: ROBITUSSIN   200 mg, Oral, Every 4 Hours PRN      lisinopril 10 MG tablet  Commonly known as: PRINIVIL,ZESTRIL   10 mg, 2 Times Daily      magnesium hydroxide 400 MG/5ML suspension  Commonly known as: MILK OF MAGNESIA   30 mL, Oral, Every 3 Days      memantine 10 MG tablet  Commonly known as: NAMENDA   10 mg, 2 Times Daily      miconazole 2 % powder  Commonly known as: MICOTIN   1 Application, 2 Times Daily      multivitamin tablet tablet  Commonly known as: THERAGRAN   1 tablet, Daily      nitrofurantoin (macrocrystal-monohydrate) 100 MG " capsule  Commonly known as: MACROBID   100 mg, Oral, Every Night at Bedtime      pravastatin 40 MG tablet  Commonly known as: PRAVACHOL   40 mg, Nightly      zinc sulfate 220 (50 Zn) MG capsule  Commonly known as: ZINCATE   220 mg, Daily             Stop These Medications      famotidine 20 MG tablet  Commonly known as: PEPCID     psyllium 58.6 % packet  Commonly known as: METAMUCIL            Discharge Diet:   Dietary Orders (From admission, onward)       Start     Ordered    07/19/25 1734  Diet: Liquid; Clear Liquid; Fluid Consistency: Thin (IDDSI 0)  Diet Effective Now        References:    Diet Order Definitions   Question Answer Comment   Diets: Liquid    Liquid Diet: Clear Liquid    Fluid Consistency: Thin (IDDSI 0)        07/19/25 5023                    Activity at Discharge: As tolerated    Follow-up Appointments:   No future appointments.    Test Results Pending at Discharge: None    Electronically signed by Sachin Perez MD, 07/20/25, 12:47 CDT.    Time: 36 minutes.             Electronically signed by Sachin Perez MD at 07/20/25 4643

## 2025-07-20 NOTE — THERAPY EVALUATION
Patient Name: Rupa Hernandez  : 1935    MRN: [id number]                              Today's Date: 2025       Admit Date: 2025    Visit Dx:     ICD-10-CM ICD-9-CM   1. Coffee ground emesis  K92.0 578.0     Patient Active Problem List   Diagnosis    Essential hypertension    Mixed hyperlipidemia    Cerebrovascular accident (CVA)    PAF (paroxysmal atrial fibrillation)    Vascular dementia without behavioral disturbance    Depression    Dementia    Cerebrovascular small vessel disease    Nonrheumatic aortic valve stenosis Mild  Echo    Hypertensive left ventricular hypertrophy, without heart failure Mild to moderate  Echo    Lumbar compression fracture    Class 2 severe obesity due to excess calories with serious comorbidity and body mass index (BMI) of 35.0 to 35.9 in adult    Non-tobacco user    Closed compression fracture of L1 lumbar vertebra    S/P kyphoplasty    GI bleeding    GI bleed    Hematemesis    Constipation    Chronic anticoagulation     Past Medical History:   Diagnosis Date    Arthritis     Dementia     Dementia     Depression     Diabetes mellitus     DIET CONTROL    Hyperlipidemia     Hypertension     Rectal polyp     Stroke      Past Surgical History:   Procedure Laterality Date    APPENDECTOMY      CATARACT EXTRACTION W/ INTRAOCULAR LENS  IMPLANT, BILATERAL      CHOLECYSTECTOMY WITH INTRAOPERATIVE CHOLANGIOGRAM N/A 10/27/2022    Procedure: CHOLECYSTECTOMY LAPAROSCOPIC;  Surgeon: Mehnaz Maloney MD;  Location: [facility] OR;  Service: General;  Laterality: N/A;    COLONOSCOPY  2014    diverticulosis    DILATION AND CURETTAGE, DIAGNOSTIC / THERAPEUTIC      ENDOSCOPY N/A 2025    Procedure: ESOPHAGOGASTRODUODENOSCOPY WITH ANESTHESIA;  Surgeon: Deric Butterfield MD;  Location: [facility] ENDOSCOPY;  Service: Gastroenterology;  Laterality: N/A;  pre gib  post normal  Dr. Hauser    HYSTERECTOMY      JOINT REPLACEMENT      KNEES    KNEE SURGERY Bilateral     X2    KYPHOPLASTY  N/A 7/11/2019    Procedure: KYPHOPLASTY, Lumbar 1;  Surgeon: Marco Sandy MD;  Location: [facility] OR;  Service: Neurosurgery    OOPHORECTOMY      ROTATOR CUFF REPAIR Right       General Information       Row Name 07/20/25 0828          OT Time and Intention    Subjective Information --  Patient spoke only a couple words.  -     Document Type evaluation  Coffee ground emesis. Large amount of stool in rectum. treating with laxatives.Hx of dementia  -     Mode of Treatment occupational therapy  -MARIANELA       Row Name 07/20/25 0828          General Information    Patient Profile Reviewed yes  -MARIANELA     Prior Level of Function dependent:;transfer;ADL's  -MARIANELA     Existing Precautions/Restrictions fall  -MARIANELA     Barriers to Rehab previous functional deficit;cognitive status  -MARIANELA       Row Name 07/20/25 0828          Living Environment    Current Living Arrangements residential facility  -     People in Home facility resident  -MARIANELA       Row Name 07/20/25 0828          Cognition    Orientation Status (Cognition) disoriented to;place;situation;person;time  -       Row Name 07/20/25 0828          Safety Issues/Impairments Affecting Functional Mobility    Safety Issues Affecting Function (Mobility) ability to follow commands;awareness of need for assistance;friction/shear risk;insight into deficits/self-awareness;judgment;problem-solving;safety precaution awareness;safety precautions follow-through/compliance;sequencing abilities  -MARIANELA     Impairments Affecting Function (Mobility) cognition  -     Cognitive Impairments, Mobility Safety/Performance attention;awareness, need for assistance;insight into deficits/self-awareness;judgment;problem-solving/reasoning;safety precaution awareness;safety precaution follow-through;sequencing abilities  -               User Key  (r) = Recorded By, (t) = Taken By, (c) = Cosigned By      Initials Name Provider Type    MARIANELA Allegra Herrera, OTR/L Occupational Therapist                      Mobility/ADL's       [facility] 07/20/25 0828          Bed Mobility    Bed Mobility supine-sit;sit-supine  -     Supine-Sit [place] (Bed Mobility) dependent (less than 25% patient effort)  -     Assistive Device (Bed Mobility) --  -MARIANELA       Row Name 07/20/25 0828          Transfers    Transfers --  -     Comment, (Transfers) Patient is dependent for transfers. Documentation indicates need for lift  at the facility.  -MARIANELA       Row Name 07/20/25 0828          Sit-Stand Transfer    Sit-Stand [place] (Transfers) dependent (less than 25% patient effort)  -MARIANELA       Row Name 07/20/25 0828          Functional Mobility    Functional Mobility- Comment --  -     Patient was able to Ambulate no, other medical factors prevent ambulation  -     Reason Patient was unable to Ambulate Non-Ambulatory at Baseline  -MARIANELA       Row Name 07/20/25 0828          Activities of Daily Living    BADL Assessment/Intervention lower body dressing  -MARIANELA       Row Name 07/20/25 0828          Lower Body Dressing Assessment/Training    [place] Level (Lower Body Dressing) don;doff;socks;dependent (less than 25% patient effort)  -     Position (Lower Body Dressing) sitting up in bed  -               User Key  (r) = Recorded By, (t) = Taken By, (c) = Cosigned By      Initials Name Provider Type     Allegra Herrera, OTR/L Occupational Therapist                   Obj/Interventions       Row Name 07/20/25 0828          Sensory Assessment (Somatosensory)    Sensory Assessment (Somatosensory) unable/difficult to assess  -MARIANELA       Row Name 07/20/25 0828          Vision Assessment/Intervention    Visual Impairment/Limitations unable/difficult to assess  -MARIANELA       Row Name 07/20/25 0828          Range of Motion Comprehensive    General Range of Motion --  -     Comment, General Range of Motion Patient would not move UE for therapist  -MARIANELA       Row Name 07/20/25 0828          Strength Comprehensive (MMT)    Comment, General  Manual Muscle Testing (MMT) Assessment RUE strength: Patient able to hold small water bottle and bring to mouth.  -       Row Name 07/20/25 0828          Balance    Balance Assessment --  -MARIANELA     Static Sitting Balance unable to assess  -MARIANELA     Dynamic Sitting Balance unable to assess  -MARIANELA     Position, Sitting Balance --  -MARIANELA               User Key  (r) = Recorded By, (t) = Taken By, (c) = Cosigned By      Initials Name Provider Type    Allegra Mata, OTR/L Occupational Therapist                   Goals/Plan       Row Name 07/20/25 0828          Transfer Goal 1 (OT)    Activity/Assistive Device (Transfer Goal 1, OT) --  -MARIANELA     Time Frame (Transfer Goal 1, OT) --  -MARIANELA     Progress/Outcome (Transfer Goal 1, OT) --  -[facility] Name 07/20/25 0828          Dressing Goal 1 (OT)    Time Frame (Dressing Goal 1, OT) --  -MARIANELA     Progress/Outcome (Dressing Goal 1, OT) --  -MARIANELA       Row Name 07/20/25 0828          Toileting Goal 1 (OT)    Activity/Device (Toileting Goal 1, OT) --  -MARIANELA     Time Frame (Toileting Goal 1, OT) --  -MARIANELA     Progress/Outcome (Toileting Goal 1, OT) --  -       Row Name 07/20/25 0828          Therapy Assessment/Plan (OT)    Planned Therapy Interventions (OT) --  -MARIANELA               User Key  (r) = Recorded By, (t) = Taken By, (c) = Cosigned By      Initials Name Provider Type    Allegra Mata, OTR/L Occupational Therapist                   Clinical Impression       [facility] Name 07/20/25 0828          Pain Assessment    Pain Management Interventions --  -     Additional Documentation Pain Scale: FACES Pre/Post-Treatment (Group)  -[facility] Name 07/20/25 0828          Pain Scale: FACES Pre/Post-Treatment    Pain: FACES Scale, Pretreatment 0-->no hurt  -     Posttreatment Pain Rating 0-->no hurt  -     Pre/Posttreatment Pain Comment Patient made little eye contact.  No response to pain questioning and no signs of pain during assessment.  -       Row Name 07/20/25 0828          Plan  "of Care Review    Plan of Care Reviewed With patient  -     Progress no change  -     Outcome Evaluation OT evaluation completed. Pt is disoriented to person, place, time and situation. Patient responded to one question during session: when therapist asked if she could raise her arms, she said \"of course\" but did not follow directions to move arms. Per previous documentation, patient was living in a facility 6 months ago and required max A of two to sit up. Patient did hold a small water bottle when it was handed to her and was able to take sips from the bottle with hand over hand assist this date. Patient's baseline function appears to be a dependent state. No recommendations for ongoing OT treatment as patient appears to be at baseline.  -       Row Name 07/20/25 0828          Therapy Assessment/Plan (OT)    Patient/Family Therapy Goal Statement (OT) --  -     Rehab Potential (OT) other (see comments)  Patient appears to be at baseline.  -     Criteria for Skilled Therapeutic Interventions Met (OT) no problems identified which require skilled intervention  -     Therapy Frequency (OT) evaluation only  -     Predicted Duration of Therapy Intervention (OT) --  -MARIANELA       Row Name 07/20/25 0828          Therapy Plan Review/Discharge Plan (OT)    Anticipated Discharge Disposition (OT) extended care facility  -       Row Name 07/20/25 0828          Positioning and Restraints    Pre-Treatment Position in bed  -MARIANELA     Post Treatment Position bed  -MARIANELA     In Bed fowlers;call light within reach;side rails up x2;encouraged to call for assist  -               User Key  (r) = Recorded By, (t) = Taken By, (c) = Cosigned By      Initials Name Provider Type    Allegra Mata, OTR/L Occupational Therapist                   Outcome Measures       Row Name 07/20/25 0828          How much help from another is currently needed...    Putting on and taking off regular lower body clothing? 1  -MARIANELA     Bathing " "(including washing, rinsing, and drying) 1  -MARIANELA     Toileting (which includes using toilet bed pan or urinal) 1  -MARIANELA     Putting on and taking off regular upper body clothing 1  -MARIANELA     Taking care of personal grooming (such as brushing teeth) 1  -MARIANELA     Eating meals 2  -MARIANELA     AM-PAC 6 Clicks Score (OT) 7  -MARIANELA       Row Name 07/20/25 0828          Functional Assessment    Outcome Measure Options AM-PAC 6 Clicks Daily Activity (OT)  -MARIANELA               User Key  (r) = Recorded By, (t) = Taken By, (c) = Cosigned By      Initials Name Provider Type    Allegra Mata, OTR/L Occupational Therapist                      OT Recommendation and Plan  Therapy Frequency (OT): evaluation only  Plan of Care Review  Plan of Care Reviewed With: patient  Progress: no change  Outcome Evaluation: OT evaluation completed. Pt is disoriented to person, place, time and situation. Patient responded to one question during session: when therapist asked if she could raise her arms, she said \"of course\" but did not follow directions to move arms. Per previous documentation, patient was living in a facility 6 months ago and required max A of two to sit up. Patient did hold a small water bottle when it was handed to her and was able to take sips from the bottle with hand over hand assist this date. Patient's baseline function appears to be a dependent state. No recommendations for ongoing OT treatment as patient appears to be at baseline.     Time Calculation:         Time Calculation- OT       Row Name 07/20/25 0828             Time Calculation- OT    OT Start Time 0828  -      OT Stop Time 0910  -      OT Time Calculation (min) 42 min  -      OT Received On 07/20/25  -         Untimed Charges    OT Eval/Re-eval Minutes 42  -MARIANELA         Total Minutes    Untimed Charges Total Minutes 42  -MARIANELA       Total Minutes 42  -MARIANEAL                User Key  (r) = Recorded By, (t) = Taken By, (c) = Cosigned By      Initials Name Provider Type    MARIANELA " Allegra Herrera, OTR/L Occupational Therapist                  Therapy Charges for Today       Code Description Service Date Service Provider Modifiers Qty    17859511891 HC OT EVAL MOD COMPLEXITY 3 7/20/2025 Allegra Herrera, OTR/L GO 1                 Allegra Treadwell OTR/L  7/20/2025

## 2025-07-20 NOTE — CONSULTS
"[facility] Gastroenterology  Initial Inpatient Consult Note    Referring Provider: Johnnie Van MD    Date of Admission: 7/19/2025  Date of Service:  07/20/25    Reason for Consultation: \"Recurrence of coffee-ground emesis\"    Subjective     History of present illness: 89-year-old woman with dementia whose  provides history.  He indicates that she had 2 episodes of coffee-ground emesis at the nursing home and her medications include Xarelto for atrial fibrillation and Pepcid.  He denies aspirin and NSAID use.  She underwent an EGD for the same problem on February 17, 2025 when she was diagnosed with a medium hiatal hernia.      Past Medical History:  Past Medical History:   Diagnosis Date    Arthritis     Dementia     Dementia     Depression     Diabetes mellitus     DIET CONTROL    Hyperlipidemia     Hypertension     Rectal polyp     Stroke        Past Surgical History:  Past Surgical History:   Procedure Laterality Date    APPENDECTOMY      CATARACT EXTRACTION W/ INTRAOCULAR LENS  IMPLANT, BILATERAL      CHOLECYSTECTOMY WITH INTRAOPERATIVE CHOLANGIOGRAM N/A 10/27/2022    Procedure: CHOLECYSTECTOMY LAPAROSCOPIC;  Surgeon: Mehnaz Maloney MD;  Location: [facility] OR;  Service: General;  Laterality: N/A;    COLONOSCOPY  07/14/2014    diverticulosis    DILATION AND CURETTAGE, DIAGNOSTIC / THERAPEUTIC      ENDOSCOPY N/A 2/17/2025    Procedure: ESOPHAGOGASTRODUODENOSCOPY WITH ANESTHESIA;  Surgeon: Deric Butterfield MD;  Location: [facility] ENDOSCOPY;  Service: Gastroenterology;  Laterality: N/A;  pre gib  post normal  Dr. Hauser    HYSTERECTOMY      JOINT REPLACEMENT      KNEES    KNEE SURGERY Bilateral     X2    KYPHOPLASTY N/A 7/11/2019    Procedure: KYPHOPLASTY, Lumbar 1;  Surgeon: Marco Sandy MD;  Location: [facility] OR;  Service: Neurosurgery    OOPHORECTOMY      ROTATOR CUFF REPAIR Right         Social History:   Social History     Tobacco Use    Smoking status: Never    Smokeless tobacco: Never "   Substance Use Topics    Alcohol use: No        Family History:  Family History   Problem Relation Age of Onset    No Known Problems Mother     No Known Problems Father     Colon cancer Paternal Grandmother        Home Meds:  Medications Prior to Admission   Medication Sig Dispense Refill Last Dose/Taking    docusate sodium (COLACE) 100 MG capsule Take 2 capsules by mouth Every Morning.   Taking    escitalopram (LEXAPRO) 10 MG tablet Take 1 tablet by mouth Daily.   Taking    famotidine (PEPCID) 20 MG tablet Take 1 tablet by mouth 2 (Two) Times a Day.   Taking    lisinopril (PRINIVIL,ZESTRIL) 10 MG tablet Take 1 tablet by mouth 2 (Two) Times a Day.   Taking    memantine (NAMENDA) 10 MG tablet Take 1 tablet by mouth 2 (Two) Times a Day.   Taking    miconazole (MICOTIN) 2 % powder Apply 1 Application topically to the appropriate area as directed 2 (Two) Times a Day.   Taking    Multiple Vitamin (MULTI-VITAMIN PO) Take 1 tablet by mouth Daily.   Taking    pravastatin (PRAVACHOL) 40 MG tablet Take 1 tablet by mouth Every Night.   Taking    psyllium (METAMUCIL) 58.6 % packet Take 1 packet by mouth Every Night.   Taking    rivaroxaban (XARELTO) 20 MG tablet Take 1 tablet by mouth Every Night.   Taking    zinc sulfate (ZINCATE) 220 (50 Zn) MG capsule Take 1 capsule by mouth Daily.   Taking    acetaminophen (TYLENOL) 325 MG tablet Take 1 tablet by mouth Every 8 (Eight) Hours As Needed for Mild Pain or Fever.       bisacodyl (DULCOLAX) 10 MG suppository Insert 1 suppository into the rectum Daily As Needed for Constipation.       guaifenesin (ROBITUSSIN) 100 MG/5ML liquid Take 10 mL by mouth Every 4 (Four) Hours As Needed for Cough.       magnesium hydroxide (MILK OF MAGNESIA) 400 MG/5ML suspension Take 30 mL by mouth Every 3 (Three) Days.       nitrofurantoin, macrocrystal-monohydrate, (MACROBID) 100 MG capsule Take 1 capsule by mouth every night at bedtime.          Current Meds:     Current Facility-Administered  Medications:     escitalopram (LEXAPRO) tablet 10 mg, 10 mg, Oral, Daily, , Neha, APRN, 10 mg at 07/20/25 0905    lactated ringers infusion, 75 mL/hr, Intravenous, Continuous, , Neha, APRN, Last Rate: 75 mL/hr at 07/19/25 1822, 75 mL/hr at 07/19/25 1822    lisinopril (PRINIVIL,ZESTRIL) tablet 10 mg, 10 mg, Oral, BID, , Neha, APRN, 10 mg at 07/20/25 0905    memantine (NAMENDA) tablet 10 mg, 10 mg, Oral, BID, , Neha, APRN, 10 mg at 07/20/25 0905    nitrofurantoin (macrocrystal-monohydrate) (MACROBID) capsule 100 mg, 100 mg, Oral, Nightly, , Neha, APRN, 100 mg at 07/19/25 2116    ondansetron ODT (ZOFRAN-ODT) disintegrating tablet 4 mg, 4 mg, Oral, Q6H PRN **OR** ondansetron (ZOFRAN) injection 4 mg, 4 mg, Intravenous, Q6H PRN, Neha Weeks, APRN    pantoprazole (PROTONIX) injection 40 mg, 40 mg, Intravenous, BID AC, Sachin Perez MD, 40 mg at 07/20/25 1036    polyethylene glycol (MIRALAX) packet 17 g, 17 g, Oral, Daily, Neha APRN, 17 g at 07/19/25 1821    pravastatin (PRAVACHOL) tablet 40 mg, 40 mg, Oral, Nightly, , Neha, APRN, 40 mg at 07/19/25 2116    [Held by provider] rivaroxaban (XARELTO) tablet 20 mg, 20 mg, Oral, Daily, , Neha, APRN    [COMPLETED] Insert Peripheral IV, , , Once **AND** sodium chloride 0.9 % flush 10 mL, 10 mL, Intravenous, PRN, Johnnie Van MD    sodium chloride 0.9 % flush 10 mL, 10 mL, Intravenous, Q12H, , Neha, APRN, 10 mL at 07/20/25 0905    sodium chloride 0.9 % flush 10 mL, 10 mL, Intravenous, PRN, , Neha, APRN    sodium chloride 0.9 % infusion 40 mL, 40 mL, Intravenous, PRN, , Neha, APRN    Allergies:  Allergies   Allergen Reactions    Adhesive Tape Rash    Atorvastatin Myalgia    Gabapentin Mental Status Change    Pregabalin Mental Status Change    Codeine Rash    Lortab [Hydrocodone-Acetaminophen] Rash    Morphine And Codeine Rash    Morphine Sulfate Rash     Oxycodone-Acetaminophen Rash    Percocet [Oxycodone-Acetaminophen] Rash       Vital Signs  Temp:  [97.4 °F (36.3 °C)-98.4 °F (36.9 °C)] 98.4 °F (36.9 °C)  Heart Rate:  [59-80] 66  Resp:  [17-18] 18  BP: (130-176)/(48-77) 130/75  Body mass index is 27.23 kg/m².    Intake/Output Summary (Last 24 hours) at 7/20/2025 1230  Last data filed at 7/20/2025 0900  Gross per 24 hour   Intake 1000 ml   Output 300 ml   Net 700 ml     I/O this shift:  In: 1000 [P.O.:1000]  Out: 300 [Urine:300]    Review of Systems     Not obtainable      Objective      Physical Exam:    General Appearance: Alert, cooperative, in no acute distress  Eyes:            No scleral icterus  Lungs:         Clear to auscultation, respiration regular, even, and unlabored  Heart::         Regular rate   Abdomen:    Normal bowel sounds, no masses, soft, nontender, non distended, no guarding  Extremities: no edema       Results Review:    I have reviewed all of the patients current test results  Results from last 7 days   Lab Units 07/20/25  0521 07/19/25  2328 07/19/25  1442   WBC 10*3/mm3 11.05*  --  10.47   HEMOGLOBIN g/dL 9.7* 10.2* 11.7*   HEMATOCRIT % 31.7* 33.2* 38.0   PLATELETS 10*3/mm3 188  --  235       Results from last 7 days   Lab Units 07/20/25  0521 07/19/25  1442   SODIUM mmol/L 137 140   POTASSIUM mmol/L 4.0 4.1   CHLORIDE mmol/L 103 101   CO2 mmol/L 26.0 27.0   BUN mg/dL 21.3 26.3*   CREATININE mg/dL 0.55* 0.72   CALCIUM mg/dL 8.4* 9.0   BILIRUBIN mg/dL 0.5 0.5   ALK PHOS U/L 64 81   ALT (SGPT) U/L 10 16   AST (SGOT) U/L 15 21   GLUCOSE mg/dL 139* 164*       Results from last 7 days   Lab Units 07/19/25  1442   INR  1.50*       Lab Results   Lab Value Date/Time    LIPASE 19 07/19/2025 1442    LIPASE 25 10/20/2022 1456       Radiology:    Imaging Results (Last 24 Hours)       Procedure Component Value Units Date/Time    CT Abdomen Pelvis With Contrast [[id number]] Collected: 07/19/25 1545     Updated: 07/19/25 1555    Narrative:       EXAM/TECHNIQUE: CT abdomen and pelvis with IV contrast     INDICATION: Coffee ground emesis     COMPARISON: None available.     DOSE LENGTH PRODUCT: 848.49 mGy.cm. Automated exposure control was  utilized to decrease patient radiation dose.     FINDINGS:     LOWER CHEST: No acute findings.     LIVER AND BILIARY: No suspicious liver lesion. The gallbladder is  surgically absent. No biliary dilatation.     PANCREAS: Unremarkable.      SPLEEN: Unremarkable.      ADRENAL: Unremarkable.     KIDNEYS/BLADDER: No solid renal mass. Punctate nonobstructing left lower  pole renal calculus. 4 mm right upper pole nonobstructing renal  calculus. No ureteral stone or hydronephrosis. No focal urinary bladder  wall thickening.      BOWEL: Large rectal stool conglomerate sigmoid diverticulosis without  diverticulitis. Prior appendectomy. Small to moderate sized hiatal  hernia. No focal area of gastric wall thickening. Mild diffuse fluid  distention of the small bowel, without evidence of bowel obstruction.  2.5 cm noncalcified linear object in the distal small bowel is likely  ingested material.     PERITONEUM: Trace ascites. No pneumoperitoneum.     PELVIC ORGANS: Prior hysterectomy. No pelvic mass.     VASCULATURE: Nonaneurysmal atherosclerotic abdominal aorta. SMA is  widely patent.     LYMPH NODES: No enlarged abdominal or pelvic lymph nodes.     SOFT TISSUES: Mild diffuse body wall soft tissue edema.     BONES: Chronic L1 compression deformity status post kyphoplasty.       Impression:         1.  Large volume stool in the rectum. Colonic diverticulosis without  diverticulitis.     2.  Moderate diffuse fluid distention of the small bowel, which can be  seen in the setting of enteritis.     3.  Small to moderate size hiatal hernia.     4.  Small nonobstructing bilateral renal calculi.     5.  Trace ascites. Diffuse body wall soft tissue edema.        This report was signed and finalized on 7/19/2025 3:52 PM by Dr. Starkey  MARINE Benz MD.                 Assessment & Plan       Hematemesis    Essential hypertension    PAF (paroxysmal atrial fibrillation)    Constipation    Chronic anticoagulation      Impression/Plan    Coffee ground emesis on Xarelto likely due to Telly erosions in the presence of a hiatal hernia, gastric mucosal prolapse, or small Dariana-Ribeiro tear.  She has undergone a recent EGD and a repeat endoscopy is not necessary as the bleeding appears to have resolved and her hematocrit is stable.  The endoscopic results are not expected to change my recommendation that she continue a PPI twice daily in place of famotidine.  Continue to monitor for progressive anemia and observe for recurrent bleeding in which case a repeat EGD will be considered.  She may resume Xarelto.      Electronically signed by Eugene Joy MD, 07/20/25, 12:30 PM CDT.         Eugene Joy MD  07/20/25  12:30 CDT

## 2025-07-20 NOTE — PLAN OF CARE
"Goal Outcome Evaluation:  Plan of Care Reviewed With: patient        Progress: no change  Outcome Evaluation: OT evaluation completed. Pt is disoriented to person, place, time and situation. Patient responded to one question during session: when therapist asked if she could raise her arms, she said \"of course\" but did not follow directions to move arms. Per previous documentation, patient was living in a facility 6 months ago and required max A of two to sit up. Patient did hold a small water bottle when it was handed to her and was able to take sips from the bottle with hand over hand assist this date. Patient's baseline function appears to be a dependent state. No recommendations for ongoing OT treatment as patient appears to be at baseline.    Anticipated Discharge Disposition (OT): extended care facility                        "

## 2025-07-20 NOTE — DISCHARGE PLACEMENT REQUEST
"Rupa Asencio (89 y.o. Female)       Date of Birth   1935    Social Security Number       Address   [address] [place] [address]    Home Phone   [phone]    MRN   [id number]       Religious   Muslim    Marital Status                               Admission Date   7/19/2025    Admission Type   Emergency    Admitting Provider   Sachin Perez MD    Attending Provider   Sachin Perez MD    Department, Room/Bed   [facility] 3C, 371/1       Discharge Date       Discharge Disposition   Skilled Nursing Facility (DC - External)    Discharge Destination                                 Attending Provider: Sachin Perez MD    Allergies: Adhesive Tape, Atorvastatin, Gabapentin, Pregabalin, Codeine, Lortab [Hydrocodone-acetaminophen], Morphine And Codeine, Morphine Sulfate, Oxycodone-acetaminophen, Percocet [Oxycodone-acetaminophen]    Isolation: None   Infection: None   Code Status: CPR    Ht: 157.5 cm (62\")   Wt: 67.5 kg (148 lb 14.4 oz)    Admission Cmt: None   Principal Problem: Hematemesis [K92.0]                   Active Insurance as of 7/19/2025       Primary Coverage       Payor Plan Insurance Group Employer/Plan Group    MEDICARE MEDICARE A & B        Payor Plan Address Payor Plan Phone Number Payor Plan Fax Number Effective Dates    [address] [phone]  6/1/2002 - None Entered    [facility] [id number]         Subscriber Name Subscriber Birth Date Member ID       RUPA ASENCIO 1935 [id number]               Secondary Coverage       Payor Plan Insurance Group Employer/Plan Group    [facility] SUPP [id number]       Payor Plan Address Payor Plan Phone Number Payor Plan Fax Number Effective Dates    [address]   12/1/2020 - None Entered    [facility] [address]         Subscriber Name Subscriber Birth Date Member ID       RUPA ASENCIO 1935 [id number]                     Emergency Contacts        (Rel.) Home Phone Work Phone " "Mobile Phone    Fam Hernandez \"POA\" (Spouse) [phone] -- --    Louie Hernandez (Son) [phone] -- [phone]                "

## (undated) DEVICE — GLV SURG BIOGEL M LTX PF 7

## (undated) DEVICE — GLV SURG BIOGEL M LTX PF 7 1/2

## (undated) DEVICE — SENSR O2 OXIMAX FNGR A/ 18IN NONSTR

## (undated) DEVICE — CVR UNIV C/ARM

## (undated) DEVICE — INTENDED FOR TISSUE SEPARATION, AND OTHER PROCEDURES THAT REQUIRE A SHARP SURGICAL BLADE TO PUNCTURE OR CUT.: Brand: BARD-PARKER ® STAINLESS STEEL BLADES

## (undated) DEVICE — PDS II VLT 0 107CM AG ST3: Brand: ENDOLOOP

## (undated) DEVICE — TOWEL,OR,DSP,ST,BLUE,STD,4/PK,20PK/CS: Brand: MEDLINE

## (undated) DEVICE — ENDOPATH XCEL WITH OPTIVIEW TECHNOLOGY UNIVERSAL TROCAR STABILITY SLEEVES: Brand: ENDOPATH XCEL OPTIVIEW

## (undated) DEVICE — CONN FLX BREATHE CIRCT

## (undated) DEVICE — APPL CHLORAPREP HI/LITE 26ML ORNG

## (undated) DEVICE — STRIP,CLOSURE,WOUND,MEDI-STRIP,1/2X4: Brand: MEDLINE

## (undated) DEVICE — ENDOPATH XCEL DILATING TIP TROCARS WITH STABILITY SLEEVES: Brand: ENDOPATH XCEL

## (undated) DEVICE — 4-PORT MANIFOLD: Brand: NEPTUNE 2

## (undated) DEVICE — ENDOPATH PNEUMONEEDLE INSUFFLATION NEEDLES WITH LUER LOCK CONNECTORS 120MM: Brand: ENDOPATH

## (undated) DEVICE — SUT VIC 0 UR6 27IN VCP603H

## (undated) DEVICE — SUT MNCRYL 4/0 PS2 27IN UD MCP426H

## (undated) DEVICE — ENDOPATH XCEL WITH OPTIVIEW TECHNOLOGY DILATING TIP TROCARS WITH STABILITY SLEEVES: Brand: ENDOPATH XCEL OPTIVIEW

## (undated) DEVICE — DEV CUT BIOP BONE KYPHX

## (undated) DEVICE — CONTAINER,SPECIMEN,OR STERILE,4OZ: Brand: MEDLINE

## (undated) DEVICE — KT CLN CLEANOR SCPE

## (undated) DEVICE — CEMENT GUN AND BONE FILLER CDS2A SISE 2: Brand: MEDTRONIC REUSABLE INSTRUMENTS

## (undated) DEVICE — Device: Brand: DEFENDO AIR/WATER/SUCTION AND BIOPSY VALVE

## (undated) DEVICE — 2, DISPOSABLE SUCTION/IRRIGATOR WITHOUT DISPOSABLE TIP: Brand: STRYKEFLOW

## (undated) DEVICE — CEMENT CARTRIDGES CC02A CDS: Brand: KYPHON® CEMENT DELIVERY SYSTEM

## (undated) DEVICE — PK KYPHOPLASTY 30

## (undated) DEVICE — MONOPOLAR METZENBAUM SCISSOR, MINI BLADE TIP, DISPOSABLE: Brand: MONOPOLAR METZENBAUM SCISSOR, MINI BLADE TIP, DISPOSABLE

## (undated) DEVICE — BAPTIST TURNOVER KIT: Brand: MEDLINE INDUSTRIES, INC.

## (undated) DEVICE — PAD LAP CHOLE: Brand: MEDLINE INDUSTRIES, INC.

## (undated) DEVICE — GLV SURG BIOGEL LTX PF 6 1/2

## (undated) DEVICE — SPK10277 JACKSON/PRO-AXIS KIT: Brand: SPK10277 JACKSON/PRO-AXIS KIT

## (undated) DEVICE — APPL CHLORAPREP W/TINT 26ML ORNG

## (undated) DEVICE — TRAP FLD MINIVAC MEGADYNE 100ML

## (undated) DEVICE — SHEET, T, LAPAROTOMY, STERILE: Brand: MEDLINE

## (undated) DEVICE — ARGYLE YANKAUER BULB TIP WITH VENT: Brand: ARGYLE

## (undated) DEVICE — THE CHANNEL CLEANING BRUSH IS A NYLON FLEXI BRUSH ATTACHED TO A FLEXIBLE PLASTIC SHEATH DESIGNED TO SAFELY REMOVE DEBRIS FROM FLEXIBLE ENDOSCOPES.

## (undated) DEVICE — ANTIBACTERIAL UNDYED BRAIDED (POLYGLACTIN 910), SYNTHETIC ABSORBABLE SUTURE: Brand: COATED VICRYL

## (undated) DEVICE — PK TURNOVER RM ADV

## (undated) DEVICE — ENDOPOUCH RETRIEVER SPECIMEN RETRIEVAL BAGS: Brand: ENDOPOUCH RETRIEVER

## (undated) DEVICE — TOOLKIT VPT02A VP NEEDLE SET CURV 13 GA: Brand: KYPHON KURVE™ CURVED BONE FILLER DEVICE

## (undated) DEVICE — BONE TAMP KIT KPX203PB FF X2 20/3 1 STP: Brand: KYPHOPAK™ TRAY

## (undated) DEVICE — CUFF,BP,DISP,1 TUBE,ADULT,HP: Brand: MEDLINE

## (undated) DEVICE — CONMED SCOPE SAVER BITE BLOCK, 20X27 MM: Brand: SCOPE SAVER

## (undated) DEVICE — GLV SURG TRIUMPH GREEN W/ALOE PF LTX 7.5 STRL